# Patient Record
Sex: MALE | Race: OTHER | HISPANIC OR LATINO
[De-identification: names, ages, dates, MRNs, and addresses within clinical notes are randomized per-mention and may not be internally consistent; named-entity substitution may affect disease eponyms.]

---

## 2017-02-24 ENCOUNTER — APPOINTMENT (OUTPATIENT)
Dept: FAMILY MEDICINE | Facility: CLINIC | Age: 47
End: 2017-02-24

## 2017-02-24 VITALS
BODY MASS INDEX: 38.33 KG/M2 | WEIGHT: 283 LBS | SYSTOLIC BLOOD PRESSURE: 147 MMHG | TEMPERATURE: 98.5 F | HEIGHT: 72 IN | DIASTOLIC BLOOD PRESSURE: 95 MMHG | OXYGEN SATURATION: 98 % | HEART RATE: 81 BPM

## 2017-02-24 VITALS — SYSTOLIC BLOOD PRESSURE: 138 MMHG | DIASTOLIC BLOOD PRESSURE: 88 MMHG

## 2017-02-24 DIAGNOSIS — Z01.818 ENCOUNTER FOR OTHER PREPROCEDURAL EXAMINATION: ICD-10-CM

## 2017-02-24 DIAGNOSIS — R21 RASH AND OTHER NONSPECIFIC SKIN ERUPTION: ICD-10-CM

## 2017-02-24 DIAGNOSIS — K62.5 HEMORRHAGE OF ANUS AND RECTUM: ICD-10-CM

## 2017-02-24 DIAGNOSIS — M65.321 TRIGGER FINGER, RIGHT INDEX FINGER: ICD-10-CM

## 2017-02-24 DIAGNOSIS — M25.561 PAIN IN RIGHT KNEE: ICD-10-CM

## 2017-02-24 DIAGNOSIS — N41.9 INFLAMMATORY DISEASE OF PROSTATE, UNSPECIFIED: ICD-10-CM

## 2017-02-24 DIAGNOSIS — Z87.891 PERSONAL HISTORY OF NICOTINE DEPENDENCE: ICD-10-CM

## 2017-02-24 DIAGNOSIS — Z87.19 PERSONAL HISTORY OF OTHER DISEASES OF THE DIGESTIVE SYSTEM: ICD-10-CM

## 2017-02-24 DIAGNOSIS — R09.82 POSTNASAL DRIP: ICD-10-CM

## 2017-02-24 DIAGNOSIS — M77.30 CALCANEAL SPUR, UNSPECIFIED FOOT: ICD-10-CM

## 2017-02-24 RX ORDER — LOSARTAN POTASSIUM 50 MG/1
50 TABLET, FILM COATED ORAL
Qty: 30 | Refills: 0 | Status: DISCONTINUED | COMMUNITY
Start: 2017-02-22

## 2017-04-07 ENCOUNTER — APPOINTMENT (OUTPATIENT)
Dept: FAMILY MEDICINE | Facility: CLINIC | Age: 47
End: 2017-04-07

## 2017-04-07 VITALS
DIASTOLIC BLOOD PRESSURE: 85 MMHG | BODY MASS INDEX: 39.01 KG/M2 | HEIGHT: 72 IN | TEMPERATURE: 97.4 F | HEART RATE: 83 BPM | WEIGHT: 288 LBS | OXYGEN SATURATION: 98 % | SYSTOLIC BLOOD PRESSURE: 156 MMHG

## 2017-04-07 DIAGNOSIS — Z78.9 OTHER SPECIFIED HEALTH STATUS: ICD-10-CM

## 2017-04-07 DIAGNOSIS — R10.30 LOWER ABDOMINAL PAIN, UNSPECIFIED: ICD-10-CM

## 2017-04-07 DIAGNOSIS — Z01.818 ENCOUNTER FOR OTHER PREPROCEDURAL EXAMINATION: ICD-10-CM

## 2017-04-08 LAB
25(OH)D3 SERPL-MCNC: 18.6 NG/ML
ANION GAP SERPL CALC-SCNC: 15 MMOL/L
BUN SERPL-MCNC: 14 MG/DL
CALCIUM SERPL-MCNC: 9.4 MG/DL
CHLORIDE SERPL-SCNC: 100 MMOL/L
CO2 SERPL-SCNC: 27 MMOL/L
CREAT SERPL-MCNC: 0.99 MG/DL
GLUCOSE SERPL-MCNC: 111 MG/DL
POTASSIUM SERPL-SCNC: 4.1 MMOL/L
SODIUM SERPL-SCNC: 142 MMOL/L

## 2017-07-12 ENCOUNTER — APPOINTMENT (OUTPATIENT)
Dept: FAMILY MEDICINE | Facility: CLINIC | Age: 47
End: 2017-07-12

## 2017-07-12 VITALS
WEIGHT: 303 LBS | HEIGHT: 72 IN | HEART RATE: 89 BPM | OXYGEN SATURATION: 97 % | SYSTOLIC BLOOD PRESSURE: 133 MMHG | DIASTOLIC BLOOD PRESSURE: 84 MMHG | TEMPERATURE: 98.5 F | BODY MASS INDEX: 41.04 KG/M2

## 2017-07-12 RX ORDER — FLUNISOLIDE 0.25 MG/ML
25 MCG/ACT SOLUTION NASAL TWICE DAILY
Qty: 3 | Refills: 1 | Status: DISCONTINUED | COMMUNITY
Start: 2017-02-22 | End: 2017-07-12

## 2017-07-12 RX ORDER — ERGOCALCIFEROL 1.25 MG/1
1.25 MG CAPSULE, LIQUID FILLED ORAL
Qty: 12 | Refills: 0 | Status: DISCONTINUED | COMMUNITY
Start: 2017-04-08 | End: 2017-07-12

## 2017-07-12 RX ORDER — NAPROXEN SODIUM 220 MG
220 TABLET ORAL
Refills: 0 | Status: DISCONTINUED | COMMUNITY
Start: 2017-02-24 | End: 2017-07-12

## 2017-07-16 LAB
25(OH)D3 SERPL-MCNC: 21.5 NG/ML
ALBUMIN SERPL ELPH-MCNC: 4.7 G/DL
ALP BLD-CCNC: 55 U/L
ALT SERPL-CCNC: 48 U/L
ANION GAP SERPL CALC-SCNC: 17 MMOL/L
AST SERPL-CCNC: 42 U/L
BASOPHILS # BLD AUTO: 0.02 K/UL
BASOPHILS NFR BLD AUTO: 0.3 %
BILIRUB SERPL-MCNC: 0.7 MG/DL
BUN SERPL-MCNC: 15 MG/DL
CALCIUM SERPL-MCNC: 10.2 MG/DL
CHLORIDE SERPL-SCNC: 101 MMOL/L
CO2 SERPL-SCNC: 22 MMOL/L
CREAT SERPL-MCNC: 1.07 MG/DL
EOSINOPHIL # BLD AUTO: 0.27 K/UL
EOSINOPHIL NFR BLD AUTO: 4 %
ERYTHROCYTE [SEDIMENTATION RATE] IN BLOOD BY WESTERGREN METHOD: 3 MM/HR
GLUCOSE SERPL-MCNC: 92 MG/DL
HBA1C MFR BLD HPLC: 5.6 %
HCT VFR BLD CALC: 40.1 %
HGB BLD-MCNC: 14.1 G/DL
IMM GRANULOCYTES NFR BLD AUTO: 0.1 %
LYMPHOCYTES # BLD AUTO: 1.36 K/UL
LYMPHOCYTES NFR BLD AUTO: 20.2 %
MAN DIFF?: NORMAL
MCHC RBC-ENTMCNC: 30 PG
MCHC RBC-ENTMCNC: 35.2 GM/DL
MCV RBC AUTO: 85.3 FL
MONOCYTES # BLD AUTO: 0.39 K/UL
MONOCYTES NFR BLD AUTO: 5.8 %
NEUTROPHILS # BLD AUTO: 4.69 K/UL
NEUTROPHILS NFR BLD AUTO: 69.6 %
PLATELET # BLD AUTO: 286 K/UL
POTASSIUM SERPL-SCNC: 3.8 MMOL/L
PROT SERPL-MCNC: 7.9 G/DL
RBC # BLD: 4.7 M/UL
RBC # FLD: 12.9 %
SODIUM SERPL-SCNC: 140 MMOL/L
T4 FREE SERPL-MCNC: 1 NG/DL
TSH SERPL-ACNC: 1.79 UIU/ML
WBC # FLD AUTO: 6.74 K/UL

## 2017-07-17 DIAGNOSIS — E29.1 TESTICULAR HYPOFUNCTION: ICD-10-CM

## 2017-07-17 LAB
TESTOST BND SERPL-MCNC: 3.6 PG/ML
TESTOST SERPL-MCNC: 297.7 NG/DL

## 2017-07-19 PROBLEM — E29.1 HYPOGONADISM, MALE: Status: ACTIVE | Noted: 2017-07-19

## 2017-10-12 ENCOUNTER — RX RENEWAL (OUTPATIENT)
Age: 47
End: 2017-10-12

## 2017-11-08 ENCOUNTER — APPOINTMENT (OUTPATIENT)
Dept: FAMILY MEDICINE | Facility: CLINIC | Age: 47
End: 2017-11-08
Payer: COMMERCIAL

## 2017-11-08 VITALS
BODY MASS INDEX: 42.66 KG/M2 | DIASTOLIC BLOOD PRESSURE: 91 MMHG | WEIGHT: 315 LBS | OXYGEN SATURATION: 97 % | HEIGHT: 72 IN | SYSTOLIC BLOOD PRESSURE: 158 MMHG | HEART RATE: 70 BPM | TEMPERATURE: 97.9 F

## 2017-11-08 DIAGNOSIS — K64.4 RESIDUAL HEMORRHOIDAL SKIN TAGS: ICD-10-CM

## 2017-11-08 DIAGNOSIS — M70.21 OLECRANON BURSITIS, RIGHT ELBOW: ICD-10-CM

## 2017-11-08 DIAGNOSIS — Z23 ENCOUNTER FOR IMMUNIZATION: ICD-10-CM

## 2017-11-08 PROCEDURE — 99214 OFFICE O/P EST MOD 30 MIN: CPT | Mod: 25

## 2017-11-08 PROCEDURE — G0008: CPT

## 2017-11-08 PROCEDURE — 90686 IIV4 VACC NO PRSV 0.5 ML IM: CPT

## 2017-11-09 PROBLEM — Z23 NEED FOR INFLUENZA VACCINATION: Status: ACTIVE | Noted: 2017-11-08

## 2017-11-09 PROBLEM — K64.4 HEMORRHOIDS, EXTERNAL: Status: ACTIVE | Noted: 2017-07-12

## 2017-11-12 PROBLEM — M70.21 OLECRANON BURSITIS OF RIGHT ELBOW: Status: ACTIVE | Noted: 2017-11-12

## 2017-12-30 ENCOUNTER — RX RENEWAL (OUTPATIENT)
Age: 47
End: 2017-12-30

## 2018-02-07 ENCOUNTER — APPOINTMENT (OUTPATIENT)
Dept: FAMILY MEDICINE | Facility: CLINIC | Age: 48
End: 2018-02-07

## 2018-04-12 ENCOUNTER — RX RENEWAL (OUTPATIENT)
Age: 48
End: 2018-04-12

## 2018-12-31 ENCOUNTER — LABORATORY RESULT (OUTPATIENT)
Age: 48
End: 2018-12-31

## 2018-12-31 ENCOUNTER — APPOINTMENT (OUTPATIENT)
Dept: INTERNAL MEDICINE | Facility: CLINIC | Age: 48
End: 2018-12-31
Payer: COMMERCIAL

## 2018-12-31 ENCOUNTER — NON-APPOINTMENT (OUTPATIENT)
Age: 48
End: 2018-12-31

## 2018-12-31 VITALS
SYSTOLIC BLOOD PRESSURE: 138 MMHG | HEIGHT: 72 IN | BODY MASS INDEX: 42.66 KG/M2 | OXYGEN SATURATION: 97 % | HEART RATE: 90 BPM | DIASTOLIC BLOOD PRESSURE: 82 MMHG | WEIGHT: 315 LBS | TEMPERATURE: 99.1 F

## 2018-12-31 DIAGNOSIS — Z23 ENCOUNTER FOR IMMUNIZATION: ICD-10-CM

## 2018-12-31 DIAGNOSIS — R94.5 ABNORMAL RESULTS OF LIVER FUNCTION STUDIES: ICD-10-CM

## 2018-12-31 DIAGNOSIS — L30.9 DERMATITIS, UNSPECIFIED: ICD-10-CM

## 2018-12-31 DIAGNOSIS — R79.89 OTHER SPECIFIED ABNORMAL FINDINGS OF BLOOD CHEMISTRY: ICD-10-CM

## 2018-12-31 PROCEDURE — 99396 PREV VISIT EST AGE 40-64: CPT | Mod: 25

## 2018-12-31 PROCEDURE — 93000 ELECTROCARDIOGRAM COMPLETE: CPT

## 2018-12-31 PROCEDURE — 36415 COLL VENOUS BLD VENIPUNCTURE: CPT

## 2018-12-31 PROCEDURE — 96127 BRIEF EMOTIONAL/BEHAV ASSMT: CPT

## 2018-12-31 PROCEDURE — 90471 IMMUNIZATION ADMIN: CPT

## 2018-12-31 PROCEDURE — 90715 TDAP VACCINE 7 YRS/> IM: CPT

## 2018-12-31 NOTE — HEALTH RISK ASSESSMENT
[Fully functional (bathing, dressing, toileting, transferring, walking, feeding)] : Fully functional (bathing, dressing, toileting, transferring, walking, feeding) [Fully functional (using the telephone, shopping, preparing meals, housekeeping, doing laundry, using] : Fully functional and needs no help or supervision to perform IADLs (using the telephone, shopping, preparing meals, housekeeping, doing laundry, using transportation, managing medications and managing finances) [No falls in past year] : Patient reported no falls in the past year [HIV Test offered] : HIV Test offered [With Significant Other] : lives with significant other [Employed] : employed [] :  [] : No [FreeTextEntry2] :

## 2018-12-31 NOTE — ASSESSMENT
[FreeTextEntry1] : \par Depression:\par -PHQ 9 score 5\par -tx options discussed\par -he thinks he would benefit from medication\par -will start lexapro 10mg Po daily  (R/B/A/side effects discussed)\par -f/u 3-4 weeks\par -no SI/HI\par \par Arthralgias/joint stiffness:\par -will check labs\par -continue meloxicam and cyclobenzaprine prn\par -will refer to rheumatology\par \par Dermatitis: ? psoriasis\par -he states clobestasol cream has helped in past-will refill\par -I again referred him to dermatology\par \par Rectal bleeding:\par -hx of external hemorrhoids\par -I again advised he see colorectal surgery\par \par Lung nodule:\par -CT chest ordered 2017 but he did not go for study\par -will order again today-importance of f/u discussed\par \par Kidney stones/Gross hematuria\par -he is followed by Urologist but would like to see another urologist for another opinion\par -appears he had cystoscopy done last year\par -will check renal US\par -will refer to Dr Hoyos\par \par HTN:\par -138/82 today-he reports he is taking losatan every day but appears I last refilled this 2017\par -for now continue losartan 50mg PO daily\par -I adv low fat/low cholesterol diet, low salt diet and weight loss encouraged\par -f/u 3-4 weeks for BP check\par \par PORTILLO:\par -on CPAP-he will f/u with pulmonary to get new supplies\par \par Obesity/Pre-DM:\par -HgA1c 5.7 2017-will check labs\par -low fat/low chol diet and weight loss advised\par \par Vitamin D deficiency:\par -will check vitamin D 25-OH\par \par Low testosterone:\par -will repeat AM testosterone and check LH/FSH/prolactin, PSA\par \par Elevated LFTs:\par -will check labs incluidng hepatitis B and c serologies\par -has hx of fatty liver\par \par HCM:\par \par CPE 2018\par \par EK2018 NSR at 78, no ST abnormalities\par \par Flu shot: 2018\par \par Tdap: advised.  R/B discussed.  VIS given.  tdap given 2018\par \par HIV testing: negative 2017-offered today 2018-he consented to testing\par \par Hepatitis C screening: negative 2015\par \par PSA: 0.77 2017-will check PSA\par \par Depression screenin2018 PHQ 9 score 5\par \par F/U 4-6 weeks.  Labs ordered-he will have done at the lab

## 2018-12-31 NOTE — HISTORY OF PRESENT ILLNESS
[de-identified] : Here for CPE\par \par He was last seen 11/2017 and labs and CT chest ordered but he did not get studies done\par \par He states his psoriasis has been acting up on his knuckles, ankles and elbows.  he states he has joint stiffness as well and is concerned about psoriatic arthritis.  he was prescribed meloxicam and cyclobenzaprine by his "arthritis doctor".  He was previously advised to have "xrays" but he did not get them done\par \par He states he was seeing urologist for hematuria.  he states kidney stone still bothers him from time to time.  he states urologist told him He states for the past year he has been feeling a depressed.  he states he has been depressed about his weight, job stress and joint pain.  No SI/HI.

## 2018-12-31 NOTE — REVIEW OF SYSTEMS
[Skin Lesions] : skin lesion [Arthralgias] : arthralgias [Joint Pain] : joint pain [Joint Stiffness] : joint stiffness [see HPI] : see HPI [Depression] : depression [Negative] : Heme/Lymph [Fever] : no fever [Chills] : no chills [Feeling Poorly] : not feeling poorly [Feeling Tired] : not feeling tired [Recent Weight Loss (___ Lbs)] : no recent weight loss [Earache] : no earache [Nasal Discharge] : no nasal discharge [Sore Throat] : no sore throat [Chest Pain] : no chest pain [Palpitations] : no palpitations [Lower Ext Edema] : no extremity edema [Shortness Of Breath] : no shortness of breath [Cough] : no cough [Wheezing] : no wheezing [SOB on Exertion] : no shortness of breath during exertion [Abdominal Pain] : no abdominal pain [Vomiting] : no vomiting [Diarrhea] : no diarrhea [Dysuria] : no dysuria [Joint Swelling] : no joint swelling [Suicidal] : not suicidal [Sleep Disturbances] : no sleep disturbances [Anxiety] : no anxiety

## 2018-12-31 NOTE — PHYSICAL EXAM
[General Appearance - Alert] : alert [General Appearance - In No Acute Distress] : in no acute distress [Sclera] : the sclera and conjunctiva were normal [PERRL With Normal Accommodation] : pupils were equal in size, round, and reactive to light [Oropharynx] : the oropharynx was normal [Neck Appearance] : the appearance of the neck was normal [Jugular Venous Distention Increased] : there was no jugular-venous distention [Auscultation Breath Sounds / Voice Sounds] : lungs were clear to auscultation bilaterally [Heart Rate And Rhythm] : heart rate was normal and rhythm regular [Heart Sounds] : normal S1 and S2 [Heart Sounds Gallop] : no gallops [Murmurs] : no murmurs [Heart Sounds Pericardial Friction Rub] : no pericardial rub [Edema] : there was no peripheral edema [Bowel Sounds] : normal bowel sounds [Abdomen Soft] : soft [Abdomen Tenderness] : non-tender [] : no hepato-splenomegaly [Abdomen Mass (___ Cm)] : no abdominal mass palpated [No Spinal Tenderness] : no spinal tenderness [Nail Clubbing] : no clubbing  or cyanosis of the fingernails [Musculoskeletal - Swelling] : no joint swelling seen [No Focal Deficits] : no focal deficits [Oriented To Time, Place, And Person] : oriented to person, place, and time [Affect] : the affect was normal [Mood] : the mood was normal [Extraocular Movements] : extraocular movements were intact [Outer Ear] : the ears and nose were normal in appearance [Both Tympanic Membranes Were Examined] : both tympanic membranes were normal [Nasal Cavity] : the nasal mucosa and septum were normal [Neck Cervical Mass (___cm)] : no neck mass was observed [Thyroid Diffuse Enlargement] : the thyroid was not enlarged [Thyroid Nodule] : there were no palpable thyroid nodules [Arterial Pulses Carotid] : carotid pulses were normal with no bruits [No CVA Tenderness] : no ~M costovertebral angle tenderness [Cranial Nerves] : cranial nerves 2-12 were intact [Motor Exam] : the motor exam was normal [FreeTextEntry1] : B/L knuckles, elbows and left ankle with patchy erythematous dermatitis with dry silvery scaling

## 2019-01-06 LAB
25(OH)D3 SERPL-MCNC: 12.5 NG/ML
ALBUMIN SERPL ELPH-MCNC: 4.5 G/DL
ALP BLD-CCNC: 60 U/L
ALT SERPL-CCNC: 52 U/L
ANA PAT FLD IF-IMP: ABNORMAL
ANACR T: ABNORMAL
ANION GAP SERPL CALC-SCNC: 12 MMOL/L
APPEARANCE: CLEAR
AST SERPL-CCNC: 38 U/L
B BURGDOR IGG+IGM SER QL IB: NORMAL
BACTERIA: NEGATIVE
BASOPHILS # BLD AUTO: 0.02 K/UL
BASOPHILS NFR BLD AUTO: 0.3 %
BILIRUB SERPL-MCNC: 0.4 MG/DL
BILIRUBIN URINE: NEGATIVE
BLOOD URINE: ABNORMAL
BUN SERPL-MCNC: 13 MG/DL
CALCIUM SERPL-MCNC: 9.4 MG/DL
CCP AB SER IA-ACNC: <8 UNITS
CHLORIDE SERPL-SCNC: 100 MMOL/L
CHOLEST SERPL-MCNC: 146 MG/DL
CHOLEST/HDLC SERPL: 3.7 RATIO
CK SERPL-CCNC: 128 U/L
CO2 SERPL-SCNC: 25 MMOL/L
COLOR: YELLOW
CREAT SERPL-MCNC: 0.89 MG/DL
CRP SERPL-MCNC: 0.32 MG/DL
EOSINOPHIL # BLD AUTO: 0.36 K/UL
EOSINOPHIL NFR BLD AUTO: 5.6 %
ERYTHROCYTE [SEDIMENTATION RATE] IN BLOOD BY WESTERGREN METHOD: 17 MM/HR
FSH SERPL-MCNC: 6 IU/L
GLUCOSE QUALITATIVE U: NEGATIVE MG/DL
GLUCOSE SERPL-MCNC: 88 MG/DL
HBA1C MFR BLD HPLC: 6 %
HBV CORE IGG+IGM SER QL: NONREACTIVE
HBV SURFACE AB SER QL: NONREACTIVE
HBV SURFACE AG SER QL: NONREACTIVE
HCT VFR BLD CALC: 44.5 %
HCV AB SER QL: NONREACTIVE
HCV S/CO RATIO: 0.05 S/CO
HDLC SERPL-MCNC: 39 MG/DL
HGB BLD-MCNC: 14.9 G/DL
HIV1+2 AB SPEC QL IA.RAPID: NONREACTIVE
HYALINE CASTS: 2 /LPF
IMM GRANULOCYTES NFR BLD AUTO: 0.3 %
KETONES URINE: NEGATIVE
LDLC SERPL CALC-MCNC: 96 MG/DL
LEUKOCYTE ESTERASE URINE: NEGATIVE
LH SERPL-ACNC: 5.7 IU/L
LYMPHOCYTES # BLD AUTO: 1.42 K/UL
LYMPHOCYTES NFR BLD AUTO: 21.9 %
MAN DIFF?: NORMAL
MCHC RBC-ENTMCNC: 29.7 PG
MCHC RBC-ENTMCNC: 33.5 GM/DL
MCV RBC AUTO: 88.6 FL
MICROSCOPIC-UA: NORMAL
MONOCYTES # BLD AUTO: 0.37 K/UL
MONOCYTES NFR BLD AUTO: 5.7 %
NEUTROPHILS # BLD AUTO: 4.29 K/UL
NEUTROPHILS NFR BLD AUTO: 66.2 %
NITRITE URINE: NEGATIVE
PH URINE: 7.5
PLATELET # BLD AUTO: 269 K/UL
POTASSIUM SERPL-SCNC: 4.1 MMOL/L
PROLACTIN SERPL-MCNC: 10.1 NG/ML
PROT SERPL-MCNC: 7.5 G/DL
PROTEIN URINE: ABNORMAL MG/DL
PSA SERPL-MCNC: 0.84 NG/ML
RBC # BLD: 5.02 M/UL
RBC # FLD: 14.1 %
RED BLOOD CELLS URINE: 461 /HPF
RF+CCP IGG SER-IMP: NEGATIVE
RHEUMATOID FACT SER QL: <10 IU/ML
SODIUM SERPL-SCNC: 137 MMOL/L
SPECIFIC GRAVITY URINE: 1.02
SQUAMOUS EPITHELIAL CELLS: 1 /HPF
T4 FREE SERPL-MCNC: 1.2 NG/DL
TESTOST BND SERPL-MCNC: 6.6 PG/ML
TESTOST SERPL-MCNC: 374.5 NG/DL
TRIGL SERPL-MCNC: 56 MG/DL
TSH SERPL-ACNC: 2.1 UIU/ML
URATE SERPL-MCNC: 5.3 MG/DL
UROBILINOGEN URINE: NEGATIVE MG/DL
WBC # FLD AUTO: 6.48 K/UL
WHITE BLOOD CELLS URINE: 1 /HPF

## 2019-01-27 ENCOUNTER — RX RENEWAL (OUTPATIENT)
Age: 49
End: 2019-01-27

## 2019-01-30 ENCOUNTER — APPOINTMENT (OUTPATIENT)
Dept: UROLOGY | Facility: CLINIC | Age: 49
End: 2019-01-30
Payer: COMMERCIAL

## 2019-01-30 DIAGNOSIS — N23 UNSPECIFIED RENAL COLIC: ICD-10-CM

## 2019-01-30 DIAGNOSIS — R31.0 GROSS HEMATURIA: ICD-10-CM

## 2019-01-30 PROCEDURE — 99205 OFFICE O/P NEW HI 60 MIN: CPT | Mod: 25

## 2019-01-30 PROCEDURE — 81003 URINALYSIS AUTO W/O SCOPE: CPT | Mod: QW

## 2019-01-30 NOTE — PHYSICAL EXAM
[General Appearance - Well Developed] : well developed [General Appearance - Well Nourished] : well nourished [Normal Appearance] : normal appearance [Well Groomed] : well groomed [General Appearance - In No Acute Distress] : no acute distress [Edema] : no peripheral edema [Respiration, Rhythm And Depth] : normal respiratory rhythm and effort [Exaggerated Use Of Accessory Muscles For Inspiration] : no accessory muscle use [Auscultation Breath Sounds / Voice Sounds] : lungs were clear to auscultation bilaterally [Bowel Sounds] : normal bowel sounds [Abdomen Soft] : soft [Abdomen Tenderness] : non-tender [Abdomen Mass (___ Cm)] : no abdominal mass palpated [Costovertebral Angle Tenderness] : no ~M costovertebral angle tenderness [FreeTextEntry1] : obese [Urethral Meatus] : meatus normal [Penis Abnormality] : normal circumcised penis [Urinary Bladder Findings] : the bladder was normal on palpation [Scrotum] : the scrotum was normal [Epididymis] : the epididymides were normal [Testes Tenderness] : no tenderness of the testes [Testes Mass (___cm)] : there were no testicular masses [Anus Abnormality] : the anus and perineum were normal [Rectal Exam - Rectum] : digital rectal exam was normal [Prostate Tenderness] : the prostate was not tender [No Prostate Nodules] : no prostate nodules [Prostate Size ___ gm] : prostate size [unfilled] gm [Normal Station and Gait] : the gait and station were normal for the patient's age [] : no rash [No Focal Deficits] : no focal deficits [Oriented To Time, Place, And Person] : oriented to person, place, and time [Affect] : the affect was normal [Mood] : the mood was normal [Not Anxious] : not anxious [No Palpable Adenopathy] : no palpable adenopathy

## 2019-01-30 NOTE — ASSESSMENT
[FreeTextEntry1] : #1) 7 mm calculus lower pole left kidney on renal sonogram from [facility] on 1/12/19\par \par #2) gross hematuria\par CT of the abdomen and pelvis without and with IV contrast with serum creatinine before the CT at [facility] with KUB now.\par \par #3) left renal colic since 1/18\par The patient was referred to my partner, Dr. Medrano. He will see him tomorrow to arrange a procedure to treat this calculus.\par He was advised to take OTC ibuprofen 200 mg, 4 tablets q.8 h. p.r.n. for pain, drain 2 L of water a day and to purchase a pocket strainer so that all of the urine should be strained in case the stone passes.

## 2019-01-30 NOTE — REVIEW OF SYSTEMS
[see HPI] : see HPI [Blood in urine that you can see] : blood visible in urine [History of kidney stones] : history of kidney stones [Wake up at night to urinate  How many times?  ___] : wakes up to urinate [unfilled] times during the night [Strain or push to urinate] : strain or push to urinate [Wait a long time to urinate] : waits a long time to urinate [Slow urine stream] : slow urine stream [Interrupted urine stream] : interrupted urine stream [Leakage of urine with urgency] : leakage of urine with urgency [Negative] : Heme/Lymph

## 2019-01-30 NOTE — LETTER BODY
[Dear  ___] : Dear  [unfilled], [Consult Letter:] : I had the pleasure of evaluating your patient, [unfilled]. [( Thank you for referring [unfilled] for consultation for _____ )] : Thank you for referring [unfilled] for consultation for [unfilled] [Please see my note below.] : Please see my note below. [Consult Closing:] : Thank you very much for allowing me to participate in the care of this patient.  If you have any questions, please do not hesitate to contact me. [Sincerely,] : Sincerely,

## 2019-01-30 NOTE — HISTORY OF PRESENT ILLNESS
[FreeTextEntry1] : The patient is a 48-year-old gentleman who was seen in the office today for the above. He stated that he was first seen by a urologist, Dr. Elsa Pastor, in 2017 for right and left groin pain and urethral discharge. At that first visit, microscopic blood was detected as well as a prostatic nodule on SHARMAINE. He was treated for prostatitis and the urethral discharge resolved in the bilateral groin pain markedly decreased but is still present intermittently. It is unclear if a CAT scan of the abdomen and pelvis without and with IV contrast was done for the microscopic hematuria. He brought medical records from Dr. Pastor's office which were reviewed and this took an extremely long time. He was taken to the operating room at [facility] on 2/28/17 by Dr. Pastor and underwent cystoscopy, left retrograde pyelography, left ureteroscopy, and TRUSP with random prostate biopsies and biopsy of an area that was called the nodule. All the prostate biopsies were negative. His PSA at that time was noted to be 0.9. The left retrograde pyelogram and left ureteroscopy/pyeloscopy were reported as negative. He was doing well until 1/18 when he developed pink urine with sharp left flank pain that was at a pain level of 3-5/10. It radiated into the left abdomen and left groin but was not associated with nausea, vomiting, fever or chills. He was able to be in drink. His daytime frequency he remained the same at 4 times a day with nocturia x1 without any other urological or constitutional symptomatology. He returned to Dr. Pastor's office and was seen by Dr. Melo who ordered a CT urogram which was done on 3/3/18 and demonstrated a 7 mm calculus in the lower pole of left kidney. He also underwent office cystoscopy which he was told was negative. According to the patient, treatment of this stone was not addressed because of its location. He was seen by his PCP, Dr. Souza, who ordered a renal sonogram. This was done on 1/12/19 and showed a 6 mm calculus in the lower pole of left kidney unchanged when compared to the CT scan of 3/3/18. He was referred to this office for urological evaluation. He continues to have intermittent renal colic which is present today.\par \par Past Urological History:\par 2000-passed a kidney stone but not recovered\par 1/17-prostatitis\par 2/28/17-cystoscopy, left retrograde pyelography, left ureteroscopy and pyeloscopy, and TRUSP with prostate biopsies-all negative.\par \par Urological Family History:\par Paternal uncle-nephrolithiasis\par Brother-benign bladder tumor

## 2019-01-31 ENCOUNTER — APPOINTMENT (OUTPATIENT)
Dept: UROLOGY | Facility: CLINIC | Age: 49
End: 2019-01-31

## 2019-01-31 LAB
APPEARANCE: CLEAR
BACTERIA: NEGATIVE
BILIRUBIN URINE: NEGATIVE
BLOOD URINE: ABNORMAL
COLOR: YELLOW
GLUCOSE QUALITATIVE U: NEGATIVE MG/DL
HYALINE CASTS: 1 /LPF
KETONES URINE: NEGATIVE
LEUKOCYTE ESTERASE URINE: NEGATIVE
MICROSCOPIC-UA: NORMAL
NITRITE URINE: NEGATIVE
PH URINE: 6
PROTEIN URINE: NEGATIVE MG/DL
RED BLOOD CELLS URINE: 6 /HPF
SPECIFIC GRAVITY URINE: 1.02
SQUAMOUS EPITHELIAL CELLS: 0 /HPF
UROBILINOGEN URINE: 1 MG/DL
WHITE BLOOD CELLS URINE: 1 /HPF

## 2019-02-04 ENCOUNTER — APPOINTMENT (OUTPATIENT)
Dept: INTERNAL MEDICINE | Facility: CLINIC | Age: 49
End: 2019-02-04

## 2019-02-04 LAB — BACTERIA UR CULT: NORMAL

## 2019-02-13 ENCOUNTER — TRANSCRIPTION ENCOUNTER (OUTPATIENT)
Age: 49
End: 2019-02-13

## 2019-02-14 ENCOUNTER — APPOINTMENT (OUTPATIENT)
Age: 49
End: 2019-02-14
Payer: COMMERCIAL

## 2019-02-14 VITALS
DIASTOLIC BLOOD PRESSURE: 85 MMHG | BODY MASS INDEX: 42.66 KG/M2 | HEART RATE: 68 BPM | HEIGHT: 72 IN | SYSTOLIC BLOOD PRESSURE: 141 MMHG | RESPIRATION RATE: 14 BRPM | WEIGHT: 315 LBS

## 2019-02-14 PROCEDURE — 99214 OFFICE O/P EST MOD 30 MIN: CPT

## 2019-02-14 NOTE — HISTORY OF PRESENT ILLNESS
[FreeTextEntry1] : 49 yo male presents for Kidney stone. \par Recently saw Dr Milian who asked him to see me to discuss further management. \par Complaining of off and on left flank pain 2-3/10.\par Denies dysuria, hematuria, fever, chills or rigors. \par Had seen another Urologist in the past and underwent Cystoscopy for Microhematuria and Prostate biopsy for Prostate nodule.

## 2019-02-14 NOTE — ASSESSMENT
[FreeTextEntry1] : Kidney stone:\par non obstructing 6 mm left Kidney stone. \par Discussed the options of watch and wait vs medical management vs intervention via ureteroscopic approach vs ESL. Risks and benefits of each modality were discussed.\par Pt wants to proceed with ESWL. \par Procedure specific risks of ESWL were discussed: risk of bleeding, infection, damage to adjacent tissue and organs, bruising in the back or abdomen, pain as the stone fragments pass, failure of stone fragments to pass, Steinstrasse requiring additional surgery. Also mentioned that that there is no definite evidence but some studies have shown potential long term risk of Diabetes and Hypertension\par Will need medical clearance from PCP and presurgical testing at [facility].\par Call for fevers, chill, severe nausea and vomiting, or severe pain or go to ER for worsening of medical condition may need urgent ureteral stent placement.\par Will get KUB after Milk of Magnesia for nights and if stone seen will proceed with ESWL. \par \par \par

## 2019-02-26 ENCOUNTER — RX RENEWAL (OUTPATIENT)
Age: 49
End: 2019-02-26

## 2019-03-07 ENCOUNTER — FORM ENCOUNTER (OUTPATIENT)
Age: 49
End: 2019-03-07

## 2019-03-08 ENCOUNTER — OUTPATIENT (OUTPATIENT)
Dept: OUTPATIENT SERVICES | Facility: HOSPITAL | Age: 49
LOS: 1 days | Discharge: HOME | End: 2019-03-08
Payer: COMMERCIAL

## 2019-03-08 VITALS
DIASTOLIC BLOOD PRESSURE: 63 MMHG | OXYGEN SATURATION: 100 % | SYSTOLIC BLOOD PRESSURE: 148 MMHG | RESPIRATION RATE: 16 BRPM | TEMPERATURE: 98 F | HEIGHT: 71.5 IN | HEART RATE: 69 BPM | WEIGHT: 315 LBS

## 2019-03-08 DIAGNOSIS — Z98.890 OTHER SPECIFIED POSTPROCEDURAL STATES: Chronic | ICD-10-CM

## 2019-03-08 DIAGNOSIS — N20.0 CALCULUS OF KIDNEY: ICD-10-CM

## 2019-03-08 LAB
ABO RH CONFIRMATION: SIGNIFICANT CHANGE UP
ANION GAP SERPL CALC-SCNC: 4 MMOL/L — LOW (ref 5–17)
APPEARANCE UR: CLEAR — SIGNIFICANT CHANGE UP
APTT BLD: 31.8 SEC — SIGNIFICANT CHANGE UP (ref 27.5–36.3)
BASOPHILS # BLD AUTO: 0.02 K/UL — SIGNIFICANT CHANGE UP (ref 0–0.2)
BASOPHILS NFR BLD AUTO: 0.5 % — SIGNIFICANT CHANGE UP (ref 0–2)
BILIRUB UR-MCNC: NEGATIVE — SIGNIFICANT CHANGE UP
BLD GP AB SCN SERPL QL: SIGNIFICANT CHANGE UP
BUN SERPL-MCNC: 13 MG/DL — SIGNIFICANT CHANGE UP (ref 7–23)
CALCIUM SERPL-MCNC: 8.7 MG/DL — SIGNIFICANT CHANGE UP (ref 8.5–10.1)
CHLORIDE SERPL-SCNC: 103 MMOL/L — SIGNIFICANT CHANGE UP (ref 96–108)
CO2 SERPL-SCNC: 30 MMOL/L — SIGNIFICANT CHANGE UP (ref 22–31)
COLOR SPEC: YELLOW — SIGNIFICANT CHANGE UP
CREAT SERPL-MCNC: 0.9 MG/DL — SIGNIFICANT CHANGE UP (ref 0.5–1.3)
DIFF PNL FLD: NEGATIVE — SIGNIFICANT CHANGE UP
EOSINOPHIL # BLD AUTO: 0.26 K/UL — SIGNIFICANT CHANGE UP (ref 0–0.5)
EOSINOPHIL NFR BLD AUTO: 5.9 % — SIGNIFICANT CHANGE UP (ref 0–6)
GLUCOSE SERPL-MCNC: 107 MG/DL — HIGH (ref 70–99)
GLUCOSE UR QL: NEGATIVE MG/DL — SIGNIFICANT CHANGE UP
HCT VFR BLD CALC: 42.3 % — SIGNIFICANT CHANGE UP (ref 39–50)
HGB BLD-MCNC: 14.7 G/DL — SIGNIFICANT CHANGE UP (ref 13–17)
IMM GRANULOCYTES NFR BLD AUTO: 0.2 % — SIGNIFICANT CHANGE UP (ref 0–1.5)
INR BLD: 1.07 RATIO — SIGNIFICANT CHANGE UP (ref 0.88–1.16)
KETONES UR-MCNC: NEGATIVE — SIGNIFICANT CHANGE UP
LEUKOCYTE ESTERASE UR-ACNC: NEGATIVE — SIGNIFICANT CHANGE UP
LYMPHOCYTES # BLD AUTO: 1.28 K/UL — SIGNIFICANT CHANGE UP (ref 1–3.3)
LYMPHOCYTES # BLD AUTO: 28.8 % — SIGNIFICANT CHANGE UP (ref 13–44)
MCHC RBC-ENTMCNC: 30.1 PG — SIGNIFICANT CHANGE UP (ref 27–34)
MCHC RBC-ENTMCNC: 34.8 GM/DL — SIGNIFICANT CHANGE UP (ref 32–36)
MCV RBC AUTO: 86.7 FL — SIGNIFICANT CHANGE UP (ref 80–100)
MONOCYTES # BLD AUTO: 0.36 K/UL — SIGNIFICANT CHANGE UP (ref 0–0.9)
MONOCYTES NFR BLD AUTO: 8.1 % — SIGNIFICANT CHANGE UP (ref 2–14)
NEUTROPHILS # BLD AUTO: 2.51 K/UL — SIGNIFICANT CHANGE UP (ref 1.8–7.4)
NEUTROPHILS NFR BLD AUTO: 56.5 % — SIGNIFICANT CHANGE UP (ref 43–77)
NITRITE UR-MCNC: NEGATIVE — SIGNIFICANT CHANGE UP
NRBC # BLD: 0 /100 WBCS — SIGNIFICANT CHANGE UP (ref 0–0)
PH UR: 5 — SIGNIFICANT CHANGE UP (ref 5–8)
PLATELET # BLD AUTO: 260 K/UL — SIGNIFICANT CHANGE UP (ref 150–400)
POTASSIUM SERPL-MCNC: 4.5 MMOL/L — SIGNIFICANT CHANGE UP (ref 3.5–5.3)
POTASSIUM SERPL-SCNC: 4.5 MMOL/L — SIGNIFICANT CHANGE UP (ref 3.5–5.3)
PROT UR-MCNC: NEGATIVE MG/DL — SIGNIFICANT CHANGE UP
PROTHROM AB SERPL-ACNC: 11.9 SEC — SIGNIFICANT CHANGE UP (ref 10–12.9)
RBC # BLD: 4.88 M/UL — SIGNIFICANT CHANGE UP (ref 4.2–5.8)
RBC # FLD: 12.6 % — SIGNIFICANT CHANGE UP (ref 10.3–14.5)
SODIUM SERPL-SCNC: 137 MMOL/L — SIGNIFICANT CHANGE UP (ref 135–145)
SP GR SPEC: 1.02 — SIGNIFICANT CHANGE UP (ref 1.01–1.02)
TYPE + AB SCN PNL BLD: SIGNIFICANT CHANGE UP
UROBILINOGEN FLD QL: NEGATIVE MG/DL — SIGNIFICANT CHANGE UP
WBC # BLD: 4.44 K/UL — SIGNIFICANT CHANGE UP (ref 3.8–10.5)
WBC # FLD AUTO: 4.44 K/UL — SIGNIFICANT CHANGE UP (ref 3.8–10.5)

## 2019-03-08 PROCEDURE — 71046 X-RAY EXAM CHEST 2 VIEWS: CPT | Mod: 26

## 2019-03-08 PROCEDURE — 93010 ELECTROCARDIOGRAM REPORT: CPT

## 2019-03-08 NOTE — H&P PST ADULT - HISTORY OF PRESENT ILLNESS
48 years old male with left kidney stone. Patient experienced left flank pain that radiated to left groin. He had sonogram, x -ray and Ct. Scan which indicated kidney stone. Hematuria present. Kidney stone in the past. Planned Lithotripsy.

## 2019-03-08 NOTE — H&P PST ADULT - ASSESSMENT
48 years old male present to PST prior to left extra corporeal lithotripsy with Dr. Medrano.  Plan   1. NPO after midnight  2. Take the following medications with sips of water on the day of procedure: Losartan, Tamsulosin  3. Drink a quart of extra  fluids the day before your surgery.  4. Medical clearance with  Dr. Souza   5. CBC, BMP, PT/PTT/INR, Urinalysis, Urine Culture, Type and Screen sent to lab  6. EKG and CXR done

## 2019-03-08 NOTE — H&P PST ADULT - TEACHING/LEARNING LEARNING PREFERENCES
computer/internet/pictorial/skill demonstration/audio/group instruction/written material/video/individual instruction/verbal instruction

## 2019-03-08 NOTE — H&P PST ADULT - FAMILY HISTORY
Mother  Still living? No  Family history of diabetes mellitus, Age at diagnosis: Age Unknown  Family history of renal failure, Age at diagnosis: Age Unknown

## 2019-03-08 NOTE — H&P PST ADULT - PSH
H/O arthroscopy of knee  Right 2014  H/O cystoscopy  2016  S/P carpal tunnel release  BIlateral 2015

## 2019-03-09 PROBLEM — R10.9 UNSPECIFIED ABDOMINAL PAIN: Chronic | Status: ACTIVE | Noted: 2019-03-08

## 2019-03-09 LAB
CULTURE RESULTS: NO GROWTH — SIGNIFICANT CHANGE UP
SPECIMEN SOURCE: SIGNIFICANT CHANGE UP

## 2019-03-11 ENCOUNTER — APPOINTMENT (OUTPATIENT)
Dept: INTERNAL MEDICINE | Facility: CLINIC | Age: 49
End: 2019-03-11
Payer: COMMERCIAL

## 2019-03-11 VITALS
BODY MASS INDEX: 42.66 KG/M2 | OXYGEN SATURATION: 95 % | HEART RATE: 78 BPM | TEMPERATURE: 98.3 F | HEIGHT: 72 IN | SYSTOLIC BLOOD PRESSURE: 124 MMHG | RESPIRATION RATE: 15 BRPM | WEIGHT: 315 LBS | DIASTOLIC BLOOD PRESSURE: 80 MMHG

## 2019-03-11 DIAGNOSIS — N20.0 CALCULUS OF KIDNEY: ICD-10-CM

## 2019-03-11 DIAGNOSIS — N62 HYPERTROPHY OF BREAST: ICD-10-CM

## 2019-03-11 PROBLEM — E66.9 OBESITY, UNSPECIFIED: Chronic | Status: ACTIVE | Noted: 2019-03-08

## 2019-03-11 PROBLEM — M25.561 PAIN IN RIGHT KNEE: Chronic | Status: ACTIVE | Noted: 2019-03-08

## 2019-03-11 PROBLEM — R09.81 NASAL CONGESTION: Chronic | Status: ACTIVE | Noted: 2019-03-08

## 2019-03-11 PROBLEM — I10 ESSENTIAL (PRIMARY) HYPERTENSION: Chronic | Status: ACTIVE | Noted: 2019-03-08

## 2019-03-11 PROBLEM — J34.2 DEVIATED NASAL SEPTUM: Chronic | Status: ACTIVE | Noted: 2019-03-08

## 2019-03-11 PROBLEM — K64.9 UNSPECIFIED HEMORRHOIDS: Chronic | Status: ACTIVE | Noted: 2019-03-08

## 2019-03-11 PROBLEM — G47.33 OBSTRUCTIVE SLEEP APNEA (ADULT) (PEDIATRIC): Chronic | Status: ACTIVE | Noted: 2019-03-08

## 2019-03-11 PROBLEM — L40.9 PSORIASIS, UNSPECIFIED: Chronic | Status: ACTIVE | Noted: 2019-03-08

## 2019-03-11 PROBLEM — K76.0 FATTY (CHANGE OF) LIVER, NOT ELSEWHERE CLASSIFIED: Chronic | Status: ACTIVE | Noted: 2019-03-08

## 2019-03-11 PROCEDURE — 99214 OFFICE O/P EST MOD 30 MIN: CPT

## 2019-03-11 NOTE — PHYSICAL EXAM
[General Appearance - Alert] : alert [General Appearance - In No Acute Distress] : in no acute distress [Sclera] : the sclera and conjunctiva were normal [PERRL With Normal Accommodation] : pupils were equal in size, round, and reactive to light [Oropharynx] : the oropharynx was normal [Neck Appearance] : the appearance of the neck was normal [Neck Cervical Mass (___cm)] : no neck mass was observed [Jugular Venous Distention Increased] : there was no jugular-venous distention [Auscultation Breath Sounds / Voice Sounds] : lungs were clear to auscultation bilaterally [Heart Rate And Rhythm] : heart rate was normal and rhythm regular [Heart Sounds] : normal S1 and S2 [Heart Sounds Gallop] : no gallops [Murmurs] : no murmurs [Heart Sounds Pericardial Friction Rub] : no pericardial rub [Edema] : there was no peripheral edema [Bowel Sounds] : normal bowel sounds [Abdomen Soft] : soft [Abdomen Tenderness] : non-tender [Abdomen Mass (___ Cm)] : no abdominal mass palpated [Nail Clubbing] : no clubbing  or cyanosis of the fingernails [Musculoskeletal - Swelling] : no joint swelling seen [No Focal Deficits] : no focal deficits [Oriented To Time, Place, And Person] : oriented to person, place, and time [Affect] : the affect was normal [Mood] : the mood was normal [FreeTextEntry1] : no calf tenderness [] : no rash

## 2019-03-11 NOTE — HISTORY OF PRESENT ILLNESS
[Atrial Fibrillation] : no atrial fibrillation [Coronary Artery Disease] : no coronary artery disease [Recent Myocardial Infarction] : no recent myocardial infarction [Implantable Device/Pacemaker] : no implantable device/pacemaker [Asthma] : no asthma [COPD] : no COPD [Sleep Apnea] : sleep apnea [Smoker] : not a smoker [FreeTextEntry1] : 3/15/2019 [FreeTextEntry2] : Lithotripsy [FreeTextEntry3] : Dr Medrano [FreeTextEntry4] : \par \par He states he feels well currently\par \par No chest pain, sob, palpitations, lower extremity edema, VALENTE\par \par He can walk over a mile without any problems. \par \par No hx of surgical or anesthesia complications in past\par \par No easy bruising or bleeding\par \par He states he is still feeling a little depressed.  he has started lexapro and he felt it was helping.  he states he ran out of medication and did not come back in for follow up.

## 2019-03-11 NOTE — REVIEW OF SYSTEMS
[see HPI] : see HPI [Depression] : depression [Fever] : no fever [Chills] : no chills [Feeling Poorly] : not feeling poorly [Feeling Tired] : not feeling tired [Recent Weight Gain (___ Lbs)] : recent [unfilled] ~Ulb weight gain [Recent Weight Loss (___ Lbs)] : no recent weight loss [Chest Pain] : no chest pain [Palpitations] : no palpitations [Lower Ext Edema] : no extremity edema [Shortness Of Breath] : no shortness of breath [Cough] : no cough [Wheezing] : no wheezing [SOB on Exertion] : no shortness of breath during exertion [Abdominal Pain] : no abdominal pain [Vomiting] : no vomiting [Diarrhea] : no diarrhea [Suicidal] : not suicidal [Sleep Disturbances] : no sleep disturbances [Anxiety] : no anxiety [Easy Bleeding] : no tendency for easy bleeding [Easy Bruising] : no tendency for easy bruising [Negative] : Integumentary

## 2019-03-11 NOTE — ASSESSMENT
[FreeTextEntry1] : \par Pre-op evaluation: nephrolithiasis -scheduled for lithotripsy\par -he was seen and examined\par -Pre-op labs from 3/8/2019 reviewed and normal\par -EKG 3/8/2019 sinus bradycardia at 56, no St abnormalities\par -there is no medical contraindication to proposed procedure.  He may proceed with planned lithotripsy\par -he will hold meloxicam/NSAIDS for 1 week prior to procedure\par \par Depression:\par -PHQ 9 score 5\par -I advised he continue lexapro 10mg Po daily  \par -F/U 2-3 months\par -no SI/HI\par \par Arthralgias/joint stiffness:\par -labs reviewed + [facility]  1:80 but all f/u testing negative\par -he has been referred to rheumatology-he states he is taking care of kidney stone first and he will make other appts after this\par \par Dermatitis: ? psoriasis\par -uses clobestasol cream prn\par -he has been referred him to dermatology-he states he is taking care of kidney stone first and he will make other appts after this\par \par Rectal bleeding:\par -no sx currently\par -hx of external hemorrhoids\par -he has been referred to colorectal surgery-he states he is taking care of kidney stone first and he will make other appts after this\par \par Lung nodule:\par -CT chest 1/2019 shows 9mm Right lower lung nodule-previously 6mm \par -I advised repeat CT chest in 6 months\par -I also advised he f/u with Pulmonary whom he has seen in past for PORTILLO-he states he will schedule appts after kidney stone has been taken care of\par \par Left gynecomastia:\par -seen on CT\par -discussed with pt\par -he denies discomfort ot pain\par -will monitor\par -weight loss advised\par \par HTN:\par -BP at goal on losartan 50mg PO daily\par -I adv low fat/low cholesterol diet, low salt diet and weight loss encouraged\par \par PORTILLO:\par -on CPAP-he will f/u with pulmonary to get new supplies\par \par Obesity/Pre-DM:\par -HgA1c 6.0 12/2018\par -low fat/low chol diet and weight loss advised\par -of not he has gained 11 lbs since last visit\par \par Vitamin D Deficiency:\par -I advised ergocalciferol 50,000 units once a week for 12 weeks\par -After finishing 12 week course of ergocalciferol, I adv starting over the counter vitamin D 3 1000 units daily\par -I advised repeat vitamin D 25-OH level at next visit\par \par Elevated LFTs:\par -ALT 52\par -hepatitis B and c serologies were negative\par -has hx of fatty liver\par -weight loss advised\par \par HCM:\par \par CPE 12/31/2018\par \par EKG: 3/2019\par \par Flu shot: 11/2018\par \par Tdap: 12/31/2018\par \par HIV testing: negative 12/2018\par \par Hepatitis C screening: negative 8/2015\par \par PSA: 0.84 12/2018\par \par Depression screening: 3/11/2019 PHQ 9 score 5\par \par F/U 2-3 months

## 2019-03-15 ENCOUNTER — OTHER (OUTPATIENT)
Age: 49
End: 2019-03-15

## 2019-03-15 ENCOUNTER — APPOINTMENT (OUTPATIENT)
Age: 49
End: 2019-03-15

## 2019-03-15 ENCOUNTER — OUTPATIENT (OUTPATIENT)
Dept: OUTPATIENT SERVICES | Facility: HOSPITAL | Age: 49
LOS: 1 days | Discharge: HOME | End: 2019-03-15
Payer: COMMERCIAL

## 2019-03-15 VITALS
RESPIRATION RATE: 18 BRPM | SYSTOLIC BLOOD PRESSURE: 137 MMHG | HEART RATE: 77 BPM | TEMPERATURE: 98 F | OXYGEN SATURATION: 96 % | DIASTOLIC BLOOD PRESSURE: 80 MMHG

## 2019-03-15 VITALS
HEART RATE: 68 BPM | RESPIRATION RATE: 16 BRPM | HEIGHT: 72 IN | SYSTOLIC BLOOD PRESSURE: 136 MMHG | DIASTOLIC BLOOD PRESSURE: 76 MMHG | OXYGEN SATURATION: 97 % | WEIGHT: 315 LBS | TEMPERATURE: 98 F

## 2019-03-15 DIAGNOSIS — Z98.890 OTHER SPECIFIED POSTPROCEDURAL STATES: Chronic | ICD-10-CM

## 2019-03-15 PROCEDURE — 50590 FRAGMENTING OF KIDNEY STONE: CPT | Mod: LT

## 2019-03-15 RX ORDER — ACETAMINOPHEN 500 MG
1000 TABLET ORAL ONCE
Qty: 0 | Refills: 0 | Status: DISCONTINUED | OUTPATIENT
Start: 2019-03-15 | End: 2019-03-15

## 2019-03-15 RX ORDER — KETOROLAC TROMETHAMINE 30 MG/ML
1 SYRINGE (ML) INJECTION
Qty: 15 | Refills: 0
Start: 2019-03-15 | End: 2019-03-19

## 2019-03-15 RX ORDER — SODIUM CHLORIDE 9 MG/ML
1000 INJECTION, SOLUTION INTRAVENOUS
Qty: 0 | Refills: 0 | Status: DISCONTINUED | OUTPATIENT
Start: 2019-03-15 | End: 2019-03-15

## 2019-03-15 RX ORDER — FAMOTIDINE 10 MG/ML
20 INJECTION INTRAVENOUS ONCE
Qty: 0 | Refills: 0 | Status: COMPLETED | OUTPATIENT
Start: 2019-03-15 | End: 2019-03-15

## 2019-03-15 RX ORDER — MEPERIDINE HYDROCHLORIDE 50 MG/ML
12.5 INJECTION INTRAMUSCULAR; INTRAVENOUS; SUBCUTANEOUS
Qty: 0 | Refills: 0 | Status: DISCONTINUED | OUTPATIENT
Start: 2019-03-15 | End: 2019-03-15

## 2019-03-15 RX ORDER — SODIUM CHLORIDE 9 MG/ML
3 INJECTION INTRAMUSCULAR; INTRAVENOUS; SUBCUTANEOUS EVERY 8 HOURS
Qty: 0 | Refills: 0 | Status: DISCONTINUED | OUTPATIENT
Start: 2019-03-15 | End: 2019-03-15

## 2019-03-15 RX ORDER — ONDANSETRON 8 MG/1
4 TABLET, FILM COATED ORAL ONCE
Qty: 0 | Refills: 0 | Status: DISCONTINUED | OUTPATIENT
Start: 2019-03-15 | End: 2019-03-15

## 2019-03-15 RX ORDER — OXYCODONE HYDROCHLORIDE 5 MG/1
5 TABLET ORAL ONCE
Qty: 0 | Refills: 0 | Status: DISCONTINUED | OUTPATIENT
Start: 2019-03-15 | End: 2019-03-15

## 2019-03-15 RX ORDER — FENTANYL CITRATE 50 UG/ML
25 INJECTION INTRAVENOUS
Qty: 0 | Refills: 0 | Status: DISCONTINUED | OUTPATIENT
Start: 2019-03-15 | End: 2019-03-15

## 2019-03-15 RX ORDER — METOCLOPRAMIDE HCL 10 MG
10 TABLET ORAL ONCE
Qty: 0 | Refills: 0 | Status: COMPLETED | OUTPATIENT
Start: 2019-03-15 | End: 2019-03-15

## 2019-03-15 RX ADMIN — Medication 10 MILLIGRAM(S): at 08:13

## 2019-03-15 RX ADMIN — FAMOTIDINE 20 MILLIGRAM(S): 10 INJECTION INTRAVENOUS at 08:13

## 2019-03-15 RX ADMIN — SODIUM CHLORIDE 75 MILLILITER(S): 9 INJECTION, SOLUTION INTRAVENOUS at 10:38

## 2019-03-15 NOTE — ASU PATIENT PROFILE, ADULT - PAIN SCALE PREFERRED, PROFILE
Follow Labor Precautions:  Call MD or return to Labor and Delivery if...    Labor (after 36 weeks)  - Painful contractions every 5 minutes for 2 hours that do not go away with 2 bottles of water, 2 tylenol, and rest.      Labor (before 36 weeks)  - More than 4 contractions in 1 hour   -First try 2 bottles of water, rest, and 2 tylenol.... If the contractions do not go away call doctors office or after hours clinic first and/or come to hospital for evaluation.      Water Breaks  - Gush or leaking of fluid from vagina, or if unsure.     Vaginal bleeding  - Bright red bleeding like a period, soaking a pad in 1 hour.    Decreased or No fetal movement  - You should feel 10 movements within two hours.  -If you are not feeling the baby move.... Drink a glass of orange juice or apple juice  - If you DO NOT feel 10 movements within two hours, please  call the MD or come to the hospital.    Unable to keep fluids or food down for more than 24 hours    Blurred vision, spots before your eyes, dizziness, headache that does not get better with Tylenol (Acetaminophen), bad swelling, chest pain, or trouble breathing.    Drink 10-12 glasses of water daily  Take medications as prescribed  Keep all follow-up appointments   numerical 0-10

## 2019-03-15 NOTE — ASU PATIENT PROFILE, ADULT - PMH
Arthralgia of right knee    Deviated septum    Fatty liver    Flank pain  Left  Hemorrhoids, unspecified hemorrhoid type    Hypertension, unspecified type    Kidney stone on left side    Nasal congestion    Obesity    Obstructive sleep apnea syndrome  CPAP Machine  Psoriasis

## 2019-03-15 NOTE — ASU DISCHARGE PLAN (ADULT/PEDIATRIC) - CARE PROVIDER_API CALL
Kavon Medrano)  Urology  [address] [facility] [facility], [address] [place] [address]  Phone: [phone]  Fax: [phone]  Follow Up Time: 1 month

## 2019-03-15 NOTE — ASU PATIENT PROFILE, ADULT - TEACHING/LEARNING LEARNING PREFERENCES
verbal instruction/written material/computer/internet/group instruction/pictorial/skill demonstration/video/audio/individual instruction

## 2019-03-15 NOTE — ASU DISCHARGE PLAN (ADULT/PEDIATRIC) - CALL YOUR DOCTOR IF YOU HAVE ANY OF THE FOLLOWING:
Fever greater than (need to indicate Fahrenheit or Celsius)/Pain not relieved by Medications/Nausea and vomiting that does not stop/Inability to tolerate liquids or foods

## 2019-03-19 DIAGNOSIS — I10 ESSENTIAL (PRIMARY) HYPERTENSION: ICD-10-CM

## 2019-03-19 DIAGNOSIS — K76.0 FATTY (CHANGE OF) LIVER, NOT ELSEWHERE CLASSIFIED: ICD-10-CM

## 2019-03-19 DIAGNOSIS — Z83.3 FAMILY HISTORY OF DIABETES MELLITUS: ICD-10-CM

## 2019-03-19 DIAGNOSIS — N20.0 CALCULUS OF KIDNEY: ICD-10-CM

## 2019-03-19 DIAGNOSIS — M17.11 UNILATERAL PRIMARY OSTEOARTHRITIS, RIGHT KNEE: ICD-10-CM

## 2019-03-19 DIAGNOSIS — L40.9 PSORIASIS, UNSPECIFIED: ICD-10-CM

## 2019-03-19 DIAGNOSIS — Z99.89 DEPENDENCE ON OTHER ENABLING MACHINES AND DEVICES: ICD-10-CM

## 2019-03-19 DIAGNOSIS — F32.9 MAJOR DEPRESSIVE DISORDER, SINGLE EPISODE, UNSPECIFIED: ICD-10-CM

## 2019-03-19 DIAGNOSIS — E66.9 OBESITY, UNSPECIFIED: ICD-10-CM

## 2019-03-19 DIAGNOSIS — Z87.891 PERSONAL HISTORY OF NICOTINE DEPENDENCE: ICD-10-CM

## 2019-03-19 DIAGNOSIS — G47.33 OBSTRUCTIVE SLEEP APNEA (ADULT) (PEDIATRIC): ICD-10-CM

## 2019-03-19 DIAGNOSIS — Z84.1 FAMILY HISTORY OF DISORDERS OF KIDNEY AND URETER: ICD-10-CM

## 2019-03-19 DIAGNOSIS — Z88.0 ALLERGY STATUS TO PENICILLIN: ICD-10-CM

## 2019-04-05 ENCOUNTER — RX RENEWAL (OUTPATIENT)
Age: 49
End: 2019-04-05

## 2019-04-08 ENCOUNTER — TRANSCRIPTION ENCOUNTER (OUTPATIENT)
Age: 49
End: 2019-04-08

## 2019-04-11 ENCOUNTER — APPOINTMENT (OUTPATIENT)
Age: 49
End: 2019-04-11
Payer: COMMERCIAL

## 2019-04-11 VITALS
DIASTOLIC BLOOD PRESSURE: 79 MMHG | SYSTOLIC BLOOD PRESSURE: 127 MMHG | WEIGHT: 315 LBS | RESPIRATION RATE: 14 BRPM | BODY MASS INDEX: 42.66 KG/M2 | HEIGHT: 72 IN | HEART RATE: 70 BPM

## 2019-04-11 PROCEDURE — 99024 POSTOP FOLLOW-UP VISIT: CPT

## 2019-04-15 NOTE — HISTORY OF PRESENT ILLNESS
[FreeTextEntry1] : 49 yo male presents for follow up. \par s/p Left ESWL on 3/15/19 at [facility]. \par Did not see stone particles in urine. \par Denies any difficulty with urination. \par Denies dysuria, hematuria, lower abdominal or flank pain, fever, chills or rigors. \par \par Initially seen for Kidney stone. \par Had seen another Urologist in the past and underwent Cystoscopy for Microhematuria and Prostate biopsy for Prostate nodule.

## 2019-04-15 NOTE — ASSESSMENT
[FreeTextEntry1] : Kidney stone:\par s/p Left ESWL on 3/15/19 at [facility]. \par Renal Ultrasound: calculus or hydronephrosis. \par KUB: Left Ureteral stone. \par \par Continue Flomax. \par Strain urine. \par Repeat  KUB in 4 weeks with 2 days of Milk of Magnesia. \par Will inform results.

## 2019-04-15 NOTE — PHYSICAL EXAM
[] : no respiratory distress [General Appearance - In No Acute Distress] : no acute distress [Oriented To Time, Place, And Person] : oriented to person, place, and time [Normal Station and Gait] : the gait and station were normal for the patient's age

## 2019-06-11 ENCOUNTER — RX RENEWAL (OUTPATIENT)
Age: 49
End: 2019-06-11

## 2019-06-26 ENCOUNTER — APPOINTMENT (OUTPATIENT)
Dept: INTERNAL MEDICINE | Facility: CLINIC | Age: 49
End: 2019-06-26
Payer: COMMERCIAL

## 2019-06-26 VITALS
OXYGEN SATURATION: 96 % | BODY MASS INDEX: 42.66 KG/M2 | HEART RATE: 88 BPM | RESPIRATION RATE: 15 BRPM | SYSTOLIC BLOOD PRESSURE: 136 MMHG | DIASTOLIC BLOOD PRESSURE: 80 MMHG | WEIGHT: 315 LBS | TEMPERATURE: 98.1 F | HEIGHT: 72 IN

## 2019-06-26 PROCEDURE — 99214 OFFICE O/P EST MOD 30 MIN: CPT

## 2019-06-26 RX ORDER — CYCLOBENZAPRINE HYDROCHLORIDE 10 MG/1
10 TABLET, FILM COATED ORAL
Qty: 90 | Refills: 1 | Status: DISCONTINUED | COMMUNITY
Start: 2018-12-31 | End: 2019-06-26

## 2019-06-26 RX ORDER — ERGOCALCIFEROL 1.25 MG/1
1.25 MG CAPSULE, LIQUID FILLED ORAL
Qty: 12 | Refills: 0 | Status: DISCONTINUED | COMMUNITY
Start: 2019-01-06 | End: 2019-06-26

## 2019-06-26 NOTE — COUNSELING
[Weight management counseling provided] : Weight management [Healthy eating counseling provided] : healthy eating [Low Fat Diet] : Low fat diet [Low Salt Diet] : Low salt diet [Walking] : Walking [Decrease Portions] : Decrease food portions [Activity counseling provided] : activity [Good understanding] : Patient has a good understanding of disease, goals and obesity follow-up plan

## 2019-07-02 ENCOUNTER — OUTPATIENT (OUTPATIENT)
Dept: OUTPATIENT SERVICES | Facility: HOSPITAL | Age: 49
LOS: 1 days | Discharge: HOME | End: 2019-07-02

## 2019-07-02 VITALS
DIASTOLIC BLOOD PRESSURE: 70 MMHG | TEMPERATURE: 98 F | RESPIRATION RATE: 18 BRPM | WEIGHT: 311.95 LBS | HEIGHT: 72 IN | SYSTOLIC BLOOD PRESSURE: 132 MMHG | HEART RATE: 68 BPM

## 2019-07-02 DIAGNOSIS — I10 ESSENTIAL (PRIMARY) HYPERTENSION: ICD-10-CM

## 2019-07-02 DIAGNOSIS — G47.33 OBSTRUCTIVE SLEEP APNEA (ADULT) (PEDIATRIC): ICD-10-CM

## 2019-07-02 DIAGNOSIS — Z98.890 OTHER SPECIFIED POSTPROCEDURAL STATES: Chronic | ICD-10-CM

## 2019-07-02 DIAGNOSIS — N20.0 CALCULUS OF KIDNEY: ICD-10-CM

## 2019-07-02 LAB
ANION GAP SERPL CALC-SCNC: 6 MMOL/L — SIGNIFICANT CHANGE UP (ref 5–17)
APPEARANCE UR: CLEAR — SIGNIFICANT CHANGE UP
APTT BLD: 29.5 SEC — SIGNIFICANT CHANGE UP (ref 27.5–36.3)
BASOPHILS # BLD AUTO: 0.02 K/UL — SIGNIFICANT CHANGE UP (ref 0–0.2)
BASOPHILS NFR BLD AUTO: 0.4 % — SIGNIFICANT CHANGE UP (ref 0–2)
BILIRUB UR-MCNC: NEGATIVE — SIGNIFICANT CHANGE UP
BUN SERPL-MCNC: 12 MG/DL — SIGNIFICANT CHANGE UP (ref 7–23)
CALCIUM SERPL-MCNC: 9.2 MG/DL — SIGNIFICANT CHANGE UP (ref 8.5–10.1)
CHLORIDE SERPL-SCNC: 106 MMOL/L — SIGNIFICANT CHANGE UP (ref 96–108)
CO2 SERPL-SCNC: 30 MMOL/L — SIGNIFICANT CHANGE UP (ref 22–31)
COLOR SPEC: YELLOW — SIGNIFICANT CHANGE UP
CREAT SERPL-MCNC: 1.1 MG/DL — SIGNIFICANT CHANGE UP (ref 0.5–1.3)
DIFF PNL FLD: ABNORMAL
EOSINOPHIL # BLD AUTO: 0.29 K/UL — SIGNIFICANT CHANGE UP (ref 0–0.5)
EOSINOPHIL NFR BLD AUTO: 5.3 % — SIGNIFICANT CHANGE UP (ref 0–6)
GLUCOSE SERPL-MCNC: 102 MG/DL — HIGH (ref 70–99)
GLUCOSE UR QL: NEGATIVE MG/DL — SIGNIFICANT CHANGE UP
HCT VFR BLD CALC: 42.3 % — SIGNIFICANT CHANGE UP (ref 39–50)
HGB BLD-MCNC: 14.8 G/DL — SIGNIFICANT CHANGE UP (ref 13–17)
IMM GRANULOCYTES NFR BLD AUTO: 0.2 % — SIGNIFICANT CHANGE UP (ref 0–1.5)
INR BLD: 1.1 RATIO — SIGNIFICANT CHANGE UP (ref 0.88–1.16)
KETONES UR-MCNC: NEGATIVE — SIGNIFICANT CHANGE UP
LEUKOCYTE ESTERASE UR-ACNC: NEGATIVE — SIGNIFICANT CHANGE UP
LYMPHOCYTES # BLD AUTO: 1.4 K/UL — SIGNIFICANT CHANGE UP (ref 1–3.3)
LYMPHOCYTES # BLD AUTO: 25.4 % — SIGNIFICANT CHANGE UP (ref 13–44)
MCHC RBC-ENTMCNC: 30.4 PG — SIGNIFICANT CHANGE UP (ref 27–34)
MCHC RBC-ENTMCNC: 35 GM/DL — SIGNIFICANT CHANGE UP (ref 32–36)
MCV RBC AUTO: 86.9 FL — SIGNIFICANT CHANGE UP (ref 80–100)
MONOCYTES # BLD AUTO: 0.51 K/UL — SIGNIFICANT CHANGE UP (ref 0–0.9)
MONOCYTES NFR BLD AUTO: 9.3 % — SIGNIFICANT CHANGE UP (ref 2–14)
NEUTROPHILS # BLD AUTO: 3.28 K/UL — SIGNIFICANT CHANGE UP (ref 1.8–7.4)
NEUTROPHILS NFR BLD AUTO: 59.4 % — SIGNIFICANT CHANGE UP (ref 43–77)
NITRITE UR-MCNC: NEGATIVE — SIGNIFICANT CHANGE UP
PH UR: 6.5 — SIGNIFICANT CHANGE UP (ref 5–8)
PLATELET # BLD AUTO: 268 K/UL — SIGNIFICANT CHANGE UP (ref 150–400)
POTASSIUM SERPL-MCNC: 4.1 MMOL/L — SIGNIFICANT CHANGE UP (ref 3.5–5.3)
POTASSIUM SERPL-SCNC: 4.1 MMOL/L — SIGNIFICANT CHANGE UP (ref 3.5–5.3)
PROT UR-MCNC: 15 MG/DL
PROTHROM AB SERPL-ACNC: 12.2 SEC — SIGNIFICANT CHANGE UP (ref 10–12.9)
RBC # BLD: 4.87 M/UL — SIGNIFICANT CHANGE UP (ref 4.2–5.8)
RBC # FLD: 12.8 % — SIGNIFICANT CHANGE UP (ref 10.3–14.5)
SODIUM SERPL-SCNC: 142 MMOL/L — SIGNIFICANT CHANGE UP (ref 135–145)
SP GR SPEC: 1.01 — SIGNIFICANT CHANGE UP (ref 1.01–1.02)
TYPE + AB SCN PNL BLD: SIGNIFICANT CHANGE UP
UROBILINOGEN FLD QL: 1 MG/DL
WBC # BLD: 5.51 K/UL — SIGNIFICANT CHANGE UP (ref 3.8–10.5)
WBC # FLD AUTO: 5.51 K/UL — SIGNIFICANT CHANGE UP (ref 3.8–10.5)

## 2019-07-02 RX ORDER — ERGOCALCIFEROL 1.25 MG/1
1 CAPSULE ORAL
Qty: 0 | Refills: 0 | DISCHARGE

## 2019-07-02 RX ORDER — CYCLOBENZAPRINE HYDROCHLORIDE 10 MG/1
1 TABLET, FILM COATED ORAL
Qty: 0 | Refills: 0 | DISCHARGE

## 2019-07-02 NOTE — H&P PST ADULT - MUSCULOSKELETAL
detailed exam no joint erythema/ROM intact/normal strength/no joint swelling/no joint warmth details…

## 2019-07-02 NOTE — H&P PST ADULT - NSICDXPASTMEDICALHX_GEN_ALL_CORE_FT
PAST MEDICAL HISTORY:  Arthralgia of right knee     Deviated septum     Fatty liver     Flank pain Left    Hemorrhoids, unspecified hemorrhoid type     Hypertension, unspecified type     Kidney stone on left side     Nasal congestion     Obesity     Obstructive sleep apnea syndrome CPAP Machine    Psoriasis

## 2019-07-02 NOTE — H&P PST ADULT - ASSESSMENT
47 y/o male with hx of HTn presents to [facility] for scheduled left ureteroscopy laser lithotripsy stone extraction and ureteral stents on 7/9/19.

## 2019-07-02 NOTE — H&P PST ADULT - HISTORY OF PRESENT ILLNESS
47 y/o male with hx of HTn presents to [facility] for scheduled left ureteroscopy laser lithotripsy stone extraction and ureteral stents on 7/9/19. Patient reports hx of 8mm kidney stone seen several urologist and placed on medications with no relief, no advised to have stent and lithotripsy done.

## 2019-07-02 NOTE — H&P PST ADULT - NSICDXPROBLEM_GEN_ALL_CORE_FT
PROBLEM DIAGNOSES  Problem: Kidney stone on left side  Assessment and Plan: Pre op instructions given and explained.  Avoid NSAIDs and OTC supplements.   Patient verbalized understanding  medical clearance completed awaiting     Problem: Hypertension, unspecified type  Assessment and Plan: Cardiac meds am of surgery with sip of water   Patient verbalized understanding.     Problem: Obstructive sleep apnea syndrome  Assessment and Plan: Bring on the day of surgery

## 2019-07-02 NOTE — H&P PST ADULT - NSICDXFAMILYHX_GEN_ALL_CORE_FT
FAMILY HISTORY:  Mother  Still living? No  Family history of diabetes mellitus, Age at diagnosis: Age Unknown  Family history of renal failure, Age at diagnosis: Age Unknown

## 2019-07-02 NOTE — H&P PST ADULT - NSICDXPASTSURGICALHX_GEN_ALL_CORE_FT
PAST SURGICAL HISTORY:  H/O arthroscopy of knee Right 2014    H/O cystoscopy 2016    S/P carpal tunnel release BIlateral 2015

## 2019-07-03 LAB
CULTURE RESULTS: NO GROWTH — SIGNIFICANT CHANGE UP
SPECIMEN SOURCE: SIGNIFICANT CHANGE UP

## 2019-07-08 NOTE — PHYSICAL EXAM
[No Acute Distress] : no acute distress [Normal Voice/Communication] : normal voice/communication [Well-Appearing] : well-appearing [Normal Sclera/Conjunctiva] : normal sclera/conjunctiva [PERRL] : pupils equal round and reactive to light [Normal Oropharynx] : the oropharynx was normal [No JVD] : no jugular venous distention [Supple] : supple [No Lymphadenopathy] : no lymphadenopathy [Thyroid Normal, No Nodules] : the thyroid was normal and there were no nodules present [No Accessory Muscle Use] : no accessory muscle use [Clear to Auscultation] : lungs were clear to auscultation bilaterally [No Respiratory Distress] : no respiratory distress  [Normal Rate] : normal rate  [Regular Rhythm] : with a regular rhythm [No Murmur] : no murmur heard [Normal S1, S2] : normal S1 and S2 [No Extremity Clubbing/Cyanosis] : no extremity clubbing/cyanosis [No Edema] : there was no peripheral edema [Non Tender] : non-tender [Soft] : abdomen soft [Non-distended] : non-distended [No Masses] : no abdominal mass palpated [No HSM] : no HSM [Normal Bowel Sounds] : normal bowel sounds [No Rash] : no rash [No Focal Deficits] : no focal deficits [Normal Mood] : the mood was normal [Alert and Oriented x3] : oriented to person, place, and time [Normal Affect] : the affect was normal [Normal Insight/Judgement] : insight and judgment were intact [No Skin Lesions] : no skin lesions [de-identified] : Obese

## 2019-07-08 NOTE — ASSESSMENT
[FreeTextEntry1] : \par Pre-op evaluation: nephrolithiasis -scheduled for ureteroscopy with stent placement\par -he was seen and examined\par -Pre-op testing to be done 7/2/2019\par -EKG 3/8/2019 sinus bradycardia at 56, no ST abnormalities\par -he will hold meloxicam/NSAIDS for 1 week prior to procedure\par \par Depression:\par -he states he is doing well on lexapro 10mg 1/2 tab Po daily -he states he only takes 1/2 tab daily\par \par Arthralgias/joint stiffness:\par - + ZAHRA  1:80 but all f/u testing negative\par -he has been referred to rheumatology-he states he is taking care of kidney stone first and he will make other appts after this\par \par Dermatitis: ? psoriasis\par -uses clobestasol cream prn\par -he has been referred him to dermatology-he states he is taking care of kidney stone first and he will make other appts after this\par \par Rectal bleeding:\par -no sx currently\par -hx of external hemorrhoids\par -he has been referred to colorectal surgery-he states he is taking care of kidney stone first and he will make other appts after this\par \par Lung nodule:\par -CT chest 1/2019 shows 9 mm Right lower lung nodule-previously 6mm \par -recent CT abd/pelvis showed 6 mm RLL lung nodule\par -I advised he repeat CT chest in 6 months 11/2019\par -I also advised he f/u with Pulmonary whom he has seen in past for PORTILLO-he again states he will schedule appts after kidney stone has been taken care of\par \par Left gynecomastia:\par -seen on CT\par -he denies discomfort or pain\par -will monitor\par -weight loss advised\par \par HTN:\par -BP near goal on losartan 50mg PO daily\par \par PORTILLO:\par -on CPAP-he will f/u with pulmonary to get new supplies\par \par Obesity/Pre-DM:\par -HgA1c 6.0 12/2018\par -low fat/low chol diet and weight loss advised\par -he has lost 10lbs since last visit exercising and eating healthier\par \par Vitamin D Deficiency:\par -I advised vitamin D 3 1000 units daily\par \par Elevated LFTs:\par -ALT 52\par -hepatitis B and c serologies were negative\par -has hx of fatty liver\par -weight loss advised\par \par HCM:\par \par CPE 12/31/2018\par \par EKG: 3/2019\par \par Flu shot: 11/2018\par \par Tdap: 12/31/2018\par \par HIV testing: negative 12/2018\par \par Hepatitis C screening: negative 8/2015\par \par PSA: 0.84 12/2018\par \par Depression screening: 3/11/2019 PHQ 9 score 5\par \par F/U 3 months-he will have [facility] send pre-op testing once completed

## 2019-07-08 NOTE — HISTORY OF PRESENT ILLNESS
[FreeTextEntry1] : Ureteroscopy with stent [FreeTextEntry2] : 7/8/2019 [FreeTextEntry3] : Dr. Medrano [FreeTextEntry4] : \par Pt is here today for pre-op evaluation. He will be getting a uteroscopy with possible stent placement on 7/8/2019 at [facility]. \par \par He will be having pre-surgical testing at [facility] 7/2/2019.\par \par He denies  CP, palpitations, VALENTE, and SOB \par \par He states he goes on elliptical machine for 30-45 min without any difficulty\par \par No Hx of surgical or anesthesia complications\par \par No easy bleeding or bruising\par \par He denies fever/chills\par

## 2019-07-08 NOTE — REVIEW OF SYSTEMS
[Recent Change In Weight] : ~T recent weight change [Negative] : Neurological [Fever] : no fever [Chills] : no chills [Fatigue] : no fatigue [Chest Pain] : no chest pain [Lower Ext Edema] : no lower extremity edema [Palpitations] : no palpitations [Shortness Of Breath] : no shortness of breath [Wheezing] : no wheezing [Cough] : no cough [Dyspnea on Exertion] : not dyspnea on exertion [Nausea] : no nausea [Abdominal Pain] : no abdominal pain [Diarrhea] : no diarrhea [Vomiting] : no vomiting [Skin Rash] : no skin rash [Dysuria] : no dysuria [Easy Bleeding] : no easy bleeding [Easy Bruising] : no easy bruising [FreeTextEntry2] : lost 10 Ibs

## 2019-07-08 NOTE — END OF VISIT
[FreeTextEntry3] : All medical entries made by the Scribe were at my, Dr. Real Souza's, direction and personally dictated by me on [6/26/2019]. I have reviewed the chart and agree that the record accurately reflects my personal performance of the history, physical exam, assessment and plan. I have also personally directed, reviewed, and agreed with the chart.\par

## 2019-07-08 NOTE — ADDENDUM
[FreeTextEntry1] : I, Marlyn Yao, acted solely as a scribe for Dr. Souza on this date [6/26/2019].\par \par 7/8/2019\par \par I received pre-op labs from 7/2/2019 cbc-normal, UA 15 protein\par \par Pre-op labs from 3/2019 reviewed again\par \par Called Hospital pre-op department -No repeat EKG was done-they stated he had one 38/2019 so repeat not done\par \par EKG from 3/8/2019 sinus bradycardia at 56n ho ST abnormalities\par \par There is no medical contraindication to proposed procedure.  he may proceed with planned procedure\par

## 2019-07-09 ENCOUNTER — OTHER (OUTPATIENT)
Age: 49
End: 2019-07-09

## 2019-07-09 ENCOUNTER — APPOINTMENT (OUTPATIENT)
Dept: UROLOGY | Facility: HOSPITAL | Age: 49
End: 2019-07-09

## 2019-07-09 ENCOUNTER — OUTPATIENT (OUTPATIENT)
Dept: OUTPATIENT SERVICES | Facility: HOSPITAL | Age: 49
LOS: 1 days | Discharge: HOME | End: 2019-07-09
Payer: COMMERCIAL

## 2019-07-09 VITALS
SYSTOLIC BLOOD PRESSURE: 105 MMHG | DIASTOLIC BLOOD PRESSURE: 55 MMHG | TEMPERATURE: 98 F | RESPIRATION RATE: 16 BRPM | HEART RATE: 51 BPM | OXYGEN SATURATION: 98 %

## 2019-07-09 VITALS — WEIGHT: 311.95 LBS | OXYGEN SATURATION: 96 % | HEIGHT: 72 IN | TEMPERATURE: 98 F

## 2019-07-09 DIAGNOSIS — Z98.890 OTHER SPECIFIED POSTPROCEDURAL STATES: Chronic | ICD-10-CM

## 2019-07-09 PROCEDURE — 52332 CYSTOSCOPY AND TREATMENT: CPT | Mod: LT

## 2019-07-09 RX ORDER — ONDANSETRON 8 MG/1
4 TABLET, FILM COATED ORAL ONCE
Refills: 0 | Status: DISCONTINUED | OUTPATIENT
Start: 2019-07-09 | End: 2019-07-09

## 2019-07-09 RX ORDER — PHENAZOPYRIDINE HCL 100 MG
1 TABLET ORAL
Qty: 9 | Refills: 0
Start: 2019-07-09 | End: 2019-07-11

## 2019-07-09 RX ORDER — MEPERIDINE HYDROCHLORIDE 50 MG/ML
12.5 INJECTION INTRAMUSCULAR; INTRAVENOUS; SUBCUTANEOUS
Refills: 0 | Status: DISCONTINUED | OUTPATIENT
Start: 2019-07-09 | End: 2019-07-09

## 2019-07-09 RX ORDER — PHENAZOPYRIDINE HCL 100 MG
200 TABLET ORAL ONCE
Refills: 0 | Status: COMPLETED | OUTPATIENT
Start: 2019-07-09 | End: 2019-07-09

## 2019-07-09 RX ORDER — FENTANYL CITRATE 50 UG/ML
50 INJECTION INTRAVENOUS
Refills: 0 | Status: DISCONTINUED | OUTPATIENT
Start: 2019-07-09 | End: 2019-07-09

## 2019-07-09 RX ORDER — OXYCODONE HYDROCHLORIDE 5 MG/1
5 TABLET ORAL ONCE
Refills: 0 | Status: DISCONTINUED | OUTPATIENT
Start: 2019-07-09 | End: 2019-07-09

## 2019-07-09 RX ORDER — SODIUM CHLORIDE 9 MG/ML
1000 INJECTION, SOLUTION INTRAVENOUS
Refills: 0 | Status: DISCONTINUED | OUTPATIENT
Start: 2019-07-09 | End: 2019-07-09

## 2019-07-09 RX ADMIN — Medication 200 MILLIGRAM(S): at 09:05

## 2019-07-09 NOTE — BRIEF OPERATIVE NOTE - NSICDXBRIEFPROCEDURE_GEN_ALL_CORE_FT
PROCEDURES:  Cystoscopy with insertion of ureteral stent in adult 09-Jul-2019 08:45:59 Left Kavon Medrano S

## 2019-07-12 DIAGNOSIS — Z99.89 DEPENDENCE ON OTHER ENABLING MACHINES AND DEVICES: ICD-10-CM

## 2019-07-12 DIAGNOSIS — E66.9 OBESITY, UNSPECIFIED: ICD-10-CM

## 2019-07-12 DIAGNOSIS — G89.29 OTHER CHRONIC PAIN: ICD-10-CM

## 2019-07-12 DIAGNOSIS — E29.1 TESTICULAR HYPOFUNCTION: ICD-10-CM

## 2019-07-12 DIAGNOSIS — M50.20 OTHER CERVICAL DISC DISPLACEMENT, UNSPECIFIED CERVICAL REGION: ICD-10-CM

## 2019-07-12 DIAGNOSIS — F41.9 ANXIETY DISORDER, UNSPECIFIED: ICD-10-CM

## 2019-07-12 DIAGNOSIS — F32.9 MAJOR DEPRESSIVE DISORDER, SINGLE EPISODE, UNSPECIFIED: ICD-10-CM

## 2019-07-12 DIAGNOSIS — N62 HYPERTROPHY OF BREAST: ICD-10-CM

## 2019-07-12 DIAGNOSIS — I10 ESSENTIAL (PRIMARY) HYPERTENSION: ICD-10-CM

## 2019-07-12 DIAGNOSIS — Z88.0 ALLERGY STATUS TO PENICILLIN: ICD-10-CM

## 2019-07-12 DIAGNOSIS — N20.0 CALCULUS OF KIDNEY: ICD-10-CM

## 2019-07-12 DIAGNOSIS — G47.33 OBSTRUCTIVE SLEEP APNEA (ADULT) (PEDIATRIC): ICD-10-CM

## 2019-07-12 DIAGNOSIS — N40.0 BENIGN PROSTATIC HYPERPLASIA WITHOUT LOWER URINARY TRACT SYMPTOMS: ICD-10-CM

## 2019-07-12 DIAGNOSIS — Z87.891 PERSONAL HISTORY OF NICOTINE DEPENDENCE: ICD-10-CM

## 2019-07-12 DIAGNOSIS — R91.1 SOLITARY PULMONARY NODULE: ICD-10-CM

## 2019-07-12 DIAGNOSIS — E55.9 VITAMIN D DEFICIENCY, UNSPECIFIED: ICD-10-CM

## 2019-07-13 ENCOUNTER — RX RENEWAL (OUTPATIENT)
Age: 49
End: 2019-07-13

## 2019-07-17 ENCOUNTER — LABORATORY RESULT (OUTPATIENT)
Age: 49
End: 2019-07-17

## 2019-07-18 ENCOUNTER — LABORATORY RESULT (OUTPATIENT)
Age: 49
End: 2019-07-18

## 2019-07-18 LAB
APPEARANCE: ABNORMAL
BILIRUBIN URINE: NEGATIVE
BLOOD URINE: ABNORMAL
COLOR: NORMAL
GLUCOSE QUALITATIVE U: NEGATIVE
KETONES URINE: NEGATIVE
LEUKOCYTE ESTERASE URINE: ABNORMAL
NITRITE URINE: NEGATIVE
PH URINE: 6
PROTEIN URINE: ABNORMAL
SPECIFIC GRAVITY URINE: 1.02
UROBILINOGEN URINE: NORMAL

## 2019-07-18 RX ORDER — FENTANYL CITRATE 50 UG/ML
50 INJECTION INTRAVENOUS
Refills: 0 | Status: DISCONTINUED | OUTPATIENT
Start: 2019-07-19 | End: 2019-07-19

## 2019-07-18 RX ORDER — ONDANSETRON 8 MG/1
4 TABLET, FILM COATED ORAL ONCE
Refills: 0 | Status: DISCONTINUED | OUTPATIENT
Start: 2019-07-19 | End: 2019-07-19

## 2019-07-18 RX ORDER — SODIUM CHLORIDE 9 MG/ML
1000 INJECTION INTRAMUSCULAR; INTRAVENOUS; SUBCUTANEOUS
Refills: 0 | Status: DISCONTINUED | OUTPATIENT
Start: 2019-07-19 | End: 2019-07-19

## 2019-07-19 ENCOUNTER — APPOINTMENT (OUTPATIENT)
Dept: UROLOGY | Facility: HOSPITAL | Age: 49
End: 2019-07-19

## 2019-07-19 ENCOUNTER — RESULT REVIEW (OUTPATIENT)
Age: 49
End: 2019-07-19

## 2019-07-19 ENCOUNTER — OTHER (OUTPATIENT)
Age: 49
End: 2019-07-19

## 2019-07-19 ENCOUNTER — OUTPATIENT (OUTPATIENT)
Dept: OUTPATIENT SERVICES | Facility: HOSPITAL | Age: 49
LOS: 1 days | Discharge: HOME | End: 2019-07-19
Payer: COMMERCIAL

## 2019-07-19 VITALS
RESPIRATION RATE: 16 BRPM | OXYGEN SATURATION: 96 % | WEIGHT: 311.95 LBS | HEART RATE: 62 BPM | SYSTOLIC BLOOD PRESSURE: 123 MMHG | TEMPERATURE: 98 F | DIASTOLIC BLOOD PRESSURE: 73 MMHG

## 2019-07-19 VITALS
HEART RATE: 52 BPM | TEMPERATURE: 97 F | SYSTOLIC BLOOD PRESSURE: 138 MMHG | DIASTOLIC BLOOD PRESSURE: 82 MMHG | RESPIRATION RATE: 15 BRPM | OXYGEN SATURATION: 98 %

## 2019-07-19 DIAGNOSIS — Z98.890 OTHER SPECIFIED POSTPROCEDURAL STATES: Chronic | ICD-10-CM

## 2019-07-19 PROCEDURE — 88300 SURGICAL PATH GROSS: CPT | Mod: 26

## 2019-07-19 PROCEDURE — 52356 CYSTO/URETERO W/LITHOTRIPSY: CPT | Mod: LT

## 2019-07-19 RX ORDER — ACETAMINOPHEN 500 MG
1000 TABLET ORAL ONCE
Refills: 0 | Status: COMPLETED | OUTPATIENT
Start: 2019-07-19 | End: 2019-07-19

## 2019-07-19 RX ORDER — PHENAZOPYRIDINE HCL 100 MG
1 TABLET ORAL
Qty: 9 | Refills: 0
Start: 2019-07-19 | End: 2019-07-21

## 2019-07-19 RX ORDER — PHENAZOPYRIDINE HCL 100 MG
200 TABLET ORAL ONCE
Refills: 0 | Status: COMPLETED | OUTPATIENT
Start: 2019-07-19 | End: 2019-07-19

## 2019-07-19 RX ORDER — OXYCODONE HYDROCHLORIDE 5 MG/1
5 TABLET ORAL ONCE
Refills: 0 | Status: DISCONTINUED | OUTPATIENT
Start: 2019-07-19 | End: 2019-07-19

## 2019-07-19 RX ADMIN — OXYCODONE HYDROCHLORIDE 5 MILLIGRAM(S): 5 TABLET ORAL at 12:15

## 2019-07-19 RX ADMIN — Medication 200 MILLIGRAM(S): at 11:37

## 2019-07-19 RX ADMIN — FENTANYL CITRATE 50 MICROGRAM(S): 50 INJECTION INTRAVENOUS at 11:47

## 2019-07-19 RX ADMIN — FENTANYL CITRATE 50 MICROGRAM(S): 50 INJECTION INTRAVENOUS at 11:45

## 2019-07-19 RX ADMIN — Medication 400 MILLIGRAM(S): at 11:40

## 2019-07-19 RX ADMIN — Medication 1000 MILLIGRAM(S): at 12:30

## 2019-07-19 RX ADMIN — OXYCODONE HYDROCHLORIDE 5 MILLIGRAM(S): 5 TABLET ORAL at 12:19

## 2019-07-19 NOTE — BRIEF OPERATIVE NOTE - NSICDXBRIEFPROCEDURE_GEN_ALL_CORE_FT
PROCEDURES:  Ureteroscopy with laser lithotripsy and stent placement 19-Jul-2019 11:33:41 Left Kavon Medrano

## 2019-07-19 NOTE — ASU DISCHARGE PLAN (ADULT/PEDIATRIC) - CALL YOUR DOCTOR IF YOU HAVE ANY OF THE FOLLOWING:
Nausea and vomiting that does not stop/Inability to tolerate liquids or foods/Fever greater than (need to indicate Fahrenheit or Celsius)/Pain not relieved by Medications

## 2019-07-22 LAB — SURGICAL PATHOLOGY STUDY: SIGNIFICANT CHANGE UP

## 2019-07-23 LAB — COMPN STONE: SIGNIFICANT CHANGE UP

## 2019-07-24 DIAGNOSIS — I10 ESSENTIAL (PRIMARY) HYPERTENSION: ICD-10-CM

## 2019-07-24 DIAGNOSIS — M50.20 OTHER CERVICAL DISC DISPLACEMENT, UNSPECIFIED CERVICAL REGION: ICD-10-CM

## 2019-07-24 DIAGNOSIS — N41.9 INFLAMMATORY DISEASE OF PROSTATE, UNSPECIFIED: ICD-10-CM

## 2019-07-24 DIAGNOSIS — N20.0 CALCULUS OF KIDNEY: ICD-10-CM

## 2019-07-24 DIAGNOSIS — E29.1 TESTICULAR HYPOFUNCTION: ICD-10-CM

## 2019-07-24 DIAGNOSIS — E66.9 OBESITY, UNSPECIFIED: ICD-10-CM

## 2019-07-24 DIAGNOSIS — K76.0 FATTY (CHANGE OF) LIVER, NOT ELSEWHERE CLASSIFIED: ICD-10-CM

## 2019-07-24 DIAGNOSIS — L40.9 PSORIASIS, UNSPECIFIED: ICD-10-CM

## 2019-07-24 DIAGNOSIS — Z87.891 PERSONAL HISTORY OF NICOTINE DEPENDENCE: ICD-10-CM

## 2019-07-24 DIAGNOSIS — Z83.3 FAMILY HISTORY OF DIABETES MELLITUS: ICD-10-CM

## 2019-07-24 DIAGNOSIS — L30.9 DERMATITIS, UNSPECIFIED: ICD-10-CM

## 2019-07-24 DIAGNOSIS — F32.9 MAJOR DEPRESSIVE DISORDER, SINGLE EPISODE, UNSPECIFIED: ICD-10-CM

## 2019-07-24 DIAGNOSIS — R73.03 PREDIABETES: ICD-10-CM

## 2019-07-24 DIAGNOSIS — M17.11 UNILATERAL PRIMARY OSTEOARTHRITIS, RIGHT KNEE: ICD-10-CM

## 2019-07-24 DIAGNOSIS — G47.33 OBSTRUCTIVE SLEEP APNEA (ADULT) (PEDIATRIC): ICD-10-CM

## 2019-07-24 DIAGNOSIS — G89.29 OTHER CHRONIC PAIN: ICD-10-CM

## 2019-07-24 DIAGNOSIS — E55.9 VITAMIN D DEFICIENCY, UNSPECIFIED: ICD-10-CM

## 2019-07-24 DIAGNOSIS — Z88.0 ALLERGY STATUS TO PENICILLIN: ICD-10-CM

## 2019-07-24 DIAGNOSIS — N40.0 BENIGN PROSTATIC HYPERPLASIA WITHOUT LOWER URINARY TRACT SYMPTOMS: ICD-10-CM

## 2019-07-31 ENCOUNTER — APPOINTMENT (OUTPATIENT)
Dept: UROLOGY | Facility: CLINIC | Age: 49
End: 2019-07-31
Payer: COMMERCIAL

## 2019-07-31 VITALS
HEART RATE: 84 BPM | SYSTOLIC BLOOD PRESSURE: 131 MMHG | TEMPERATURE: 98 F | BODY MASS INDEX: 41.85 KG/M2 | DIASTOLIC BLOOD PRESSURE: 79 MMHG | HEIGHT: 72 IN | WEIGHT: 309 LBS | OXYGEN SATURATION: 95 %

## 2019-07-31 DIAGNOSIS — Z46.6 ENCOUNTER FOR FITTING AND ADJUSTMENT OF URINARY DEVICE: ICD-10-CM

## 2019-07-31 PROCEDURE — 52310 CYSTOSCOPY AND TREATMENT: CPT

## 2019-08-27 ENCOUNTER — RX RENEWAL (OUTPATIENT)
Age: 49
End: 2019-08-27

## 2019-09-06 ENCOUNTER — RX RENEWAL (OUTPATIENT)
Age: 49
End: 2019-09-06

## 2019-09-27 ENCOUNTER — RX RENEWAL (OUTPATIENT)
Age: 49
End: 2019-09-27

## 2019-10-12 ENCOUNTER — APPOINTMENT (OUTPATIENT)
Dept: INTERNAL MEDICINE | Facility: CLINIC | Age: 49
End: 2019-10-12
Payer: COMMERCIAL

## 2019-10-12 VITALS
HEART RATE: 50 BPM | HEIGHT: 72 IN | OXYGEN SATURATION: 97 % | TEMPERATURE: 98.2 F | WEIGHT: 315 LBS | DIASTOLIC BLOOD PRESSURE: 75 MMHG | SYSTOLIC BLOOD PRESSURE: 133 MMHG | BODY MASS INDEX: 42.66 KG/M2

## 2019-10-12 DIAGNOSIS — R63.5 ABNORMAL WEIGHT GAIN: ICD-10-CM

## 2019-10-12 DIAGNOSIS — Z12.11 ENCOUNTER FOR SCREENING FOR MALIGNANT NEOPLASM OF COLON: ICD-10-CM

## 2019-10-12 DIAGNOSIS — M25.50 PAIN IN UNSPECIFIED JOINT: ICD-10-CM

## 2019-10-12 DIAGNOSIS — N20.0 CALCULUS OF KIDNEY: ICD-10-CM

## 2019-10-12 DIAGNOSIS — K62.5 HEMORRHAGE OF ANUS AND RECTUM: ICD-10-CM

## 2019-10-12 PROCEDURE — 99214 OFFICE O/P EST MOD 30 MIN: CPT | Mod: 25

## 2019-10-12 PROCEDURE — 96127 BRIEF EMOTIONAL/BEHAV ASSMT: CPT

## 2019-10-12 PROCEDURE — 90471 IMMUNIZATION ADMIN: CPT

## 2019-10-12 PROCEDURE — 36415 COLL VENOUS BLD VENIPUNCTURE: CPT

## 2019-10-12 PROCEDURE — 90674 CCIIV4 VAC NO PRSV 0.5 ML IM: CPT

## 2019-10-13 PROBLEM — M25.50 ARTHRALGIA: Status: ACTIVE | Noted: 2018-12-31

## 2019-10-13 PROBLEM — N20.0 NEPHROLITHIASIS: Status: ACTIVE | Noted: 2017-02-24

## 2019-10-13 PROBLEM — Z12.11 COLON CANCER SCREENING: Status: ACTIVE | Noted: 2019-10-12

## 2019-10-13 PROBLEM — K62.5 RECTAL BLEEDING: Status: ACTIVE | Noted: 2017-07-12

## 2019-10-13 PROBLEM — R63.5 WEIGHT GAIN: Status: ACTIVE | Noted: 2017-07-12

## 2019-10-13 LAB
25(OH)D3 SERPL-MCNC: 18.8 NG/ML
ALBUMIN SERPL ELPH-MCNC: 4.5 G/DL
ALP BLD-CCNC: 62 U/L
ALT SERPL-CCNC: 39 U/L
ANION GAP SERPL CALC-SCNC: 15 MMOL/L
APPEARANCE: CLEAR
AST SERPL-CCNC: 31 U/L
BACTERIA: NEGATIVE
BILIRUB SERPL-MCNC: 0.4 MG/DL
BILIRUBIN URINE: NEGATIVE
BLOOD URINE: NEGATIVE
BUN SERPL-MCNC: 14 MG/DL
CALCIUM SERPL-MCNC: 9.4 MG/DL
CALCIUM SERPL-MCNC: 9.4 MG/DL
CHLORIDE SERPL-SCNC: 103 MMOL/L
CO2 SERPL-SCNC: 21 MMOL/L
COLOR: YELLOW
CREAT SERPL-MCNC: 0.86 MG/DL
ESTIMATED AVERAGE GLUCOSE: 123 MG/DL
GLUCOSE QUALITATIVE U: NEGATIVE
GLUCOSE SERPL-MCNC: 106 MG/DL
HBA1C MFR BLD HPLC: 5.9 %
HYALINE CASTS: 1 /LPF
KETONES URINE: NEGATIVE
LEUKOCYTE ESTERASE URINE: NEGATIVE
MICROSCOPIC-UA: NORMAL
NITRITE URINE: NEGATIVE
PARATHYROID HORMONE INTACT: 30 PG/ML
PH URINE: 6.5
POTASSIUM SERPL-SCNC: 4 MMOL/L
PROT SERPL-MCNC: 6.9 G/DL
PROTEIN URINE: NEGATIVE
RED BLOOD CELLS URINE: 1 /HPF
SODIUM SERPL-SCNC: 139 MMOL/L
SPECIFIC GRAVITY URINE: 1.03
SQUAMOUS EPITHELIAL CELLS: 1 /HPF
TSH SERPL-ACNC: 1.32 UIU/ML
UROBILINOGEN URINE: NORMAL
WHITE BLOOD CELLS URINE: 1 /HPF

## 2019-10-13 NOTE — REVIEW OF SYSTEMS
[Recent Change In Weight] : ~T recent weight change [Fever] : no fever [Chills] : no chills [Fatigue] : no fatigue [Chest Pain] : no chest pain [Palpitations] : no palpitations [Lower Ext Edema] : no lower extremity edema [Shortness Of Breath] : no shortness of breath [Wheezing] : no wheezing [Cough] : no cough [Dyspnea on Exertion] : not dyspnea on exertion [Abdominal Pain] : no abdominal pain [Nausea] : no nausea [Diarrhea] : no diarrhea [Vomiting] : no vomiting [Anxiety] : no anxiety [Depression] : no depression [Negative] : Psychiatric [FreeTextEntry2] : gained 11 lbs

## 2019-10-13 NOTE — HISTORY OF PRESENT ILLNESS
[de-identified] : Here for follow up\par \par he states kidney stones was removed s/p ureteroscopy and stent.  he states stent has not bee removed.  he has has f/u with urologist next week\par \par he states he currently feels well and denies any complaints\par \par He would like flu shot.  He denies any egg allergies\par \par

## 2019-10-13 NOTE — COUNSELING
[Potential consequences of obesity discussed] : Potential consequences of obesity discussed [Benefits of weight loss discussed] : Benefits of weight loss discussed [Encouraged to increase physical activity] : Encouraged to increase physical activity [Good understanding] : Patient has a good understanding of disease, goals and obesity follow-up plan

## 2019-10-13 NOTE — PHYSICAL EXAM
[No Acute Distress] : no acute distress [Well-Appearing] : well-appearing [Normal Voice/Communication] : normal voice/communication [Normal Sclera/Conjunctiva] : normal sclera/conjunctiva [PERRL] : pupils equal round and reactive to light [Normal Oropharynx] : the oropharynx was normal [No JVD] : no jugular venous distention [Supple] : supple [No Lymphadenopathy] : no lymphadenopathy [Thyroid Normal, No Nodules] : the thyroid was normal and there were no nodules present [No Respiratory Distress] : no respiratory distress  [Clear to Auscultation] : lungs were clear to auscultation bilaterally [No Accessory Muscle Use] : no accessory muscle use [Normal Rate] : normal rate  [Regular Rhythm] : with a regular rhythm [Normal S1, S2] : normal S1 and S2 [No Murmur] : no murmur heard [No Edema] : there was no peripheral edema [No Extremity Clubbing/Cyanosis] : no extremity clubbing/cyanosis [Soft] : abdomen soft [Non Tender] : non-tender [Non-distended] : non-distended [No Masses] : no abdominal mass palpated [No HSM] : no HSM [Normal Bowel Sounds] : normal bowel sounds [No Rash] : no rash [No Focal Deficits] : no focal deficits [Normal Affect] : the affect was normal [Alert and Oriented x3] : oriented to person, place, and time [Normal Mood] : the mood was normal [Normal Insight/Judgement] : insight and judgment were intact [de-identified] : Obese

## 2019-10-13 NOTE — ASSESSMENT
[FreeTextEntry1] : \par Nephrolithiasis s/p ureteroscopy with stent placement\par -he is followed by Urologist\par -will check UA and PTH\par \par Depression:\par -on lexapro 10mg 1/2 tab Po daily \par -PHQ 2 score 0\par \par Arthralgias/joint stiffness:\par - + ZAHRA  1:80 but all f/u testing negative\par -he has been referred to rheumatology-I again advised he make appt\par -uses mobic prn\par \par Dermatitis: ? psoriasis\par -uses clobestasol cream prn\par -he has been referred him to dermatology-I again advised he make appt\par \par Rectal bleeding:\par -no sx currently\par -hx of external hemorrhoids\par -he has been referred to colorectal surgery-again advised\par \par Lung nodule:\par -CT chest 1/2019 shows 9 mm Right lower lung nodule-previously 6mm \par -recent CT abd/pelvis showed 6 mm RLL lung nodule\par -I advised he repeat CT chest in 6 months 11/2019-will order today\par -I also advised he f/u with Pulmonary \par \par Left gynecomastia:\par -seen on CT\par -he denies discomfort or pain\par -will monitor\par -weight loss advised\par \par HTN:\par -BP near goal on losartan 50mg PO daily\par -will check labs\par \par Obesity/Pre-DM:\par -BMI 43\par -HgA1c 6.0\par -risks of obesity discussed\par -benefits of weight loss discussed\par -low fat/low chol diet and low carbohydrate diet advised\par -small portion sizes encouraged\par -I advised avoidance of sugary drinks\par -aerobic exercise encouraged\par -weight loss advised\par -labs ordered today\par \par PORTILLO:\par -on CPAP-followed by pulmonary\par \par Vitamin D Deficiency:\par -vitamin D 3 1000 units daily\par -will check vitamin D 25-OH\par \par Elevated LFTs:\par -hepatitis B and c serologies were negative\par -has hx of fatty liver\par -weight loss advised\par -will check CMP\par \par HCM:\par \par CPE 12/31/2018\par \par EKG: 3/2019\par \par Flu shot: advised.  R/B discussed.  No egg allergy reported.  VIS given.  Flu shot given today 10/12/2019\par \par Tdap: 12/31/2018\par \par HIV testing: negative 12/2018\par \par Hepatitis C screening: negative 8/2015\par \par PSA: 0.84 12/2018\par \par Depression screening: 10/12/2019 PHQ 2 score 0\par \par Colon cancer screening: discussed with pt as he is 49-I advised he see GI for colonoscopy-he was reluctant as he does not have much time at this time and states wants to continue to follow up with urologist first to make sure kidney stone is taken care of.  I will order FIT test and he was agreeable\par \par F/U 3 months.  labs drawn in office today

## 2019-10-18 ENCOUNTER — RESULT REVIEW (OUTPATIENT)
Age: 49
End: 2019-10-18

## 2019-10-24 ENCOUNTER — APPOINTMENT (OUTPATIENT)
Age: 49
End: 2019-10-24
Payer: COMMERCIAL

## 2019-10-24 VITALS
HEART RATE: 60 BPM | SYSTOLIC BLOOD PRESSURE: 117 MMHG | OXYGEN SATURATION: 96 % | DIASTOLIC BLOOD PRESSURE: 85 MMHG | HEIGHT: 72 IN | RESPIRATION RATE: 16 BRPM | BODY MASS INDEX: 42.66 KG/M2 | WEIGHT: 315 LBS

## 2019-10-24 PROCEDURE — 99214 OFFICE O/P EST MOD 30 MIN: CPT

## 2019-10-31 NOTE — ASSESSMENT
[FreeTextEntry1] : Kidney stone:\par Recommended Kidney stone prevention diet:\par Good oral hydration so that urine is clear to light yellow, usually 1.5 to 2 Liters of fluids, mainly water\par Increasing Citrate in diet by consuming citrus fruits and juices- krystal, limes, oranges, grapefruits and berries \par Less red meat\par Less salt\par Limit foods with oxalate like- dark green vegetables, rhubarb, chocolate, wheat bran, nuts, cranberries, and beans\par \par Renal Ultrasound pending.\par Will get Kidney stone metabolic work up- Uric acid and 24 Hr urine kidney stone risk profile.\par Will inform results. \par \par Return to office in 6 months or sooner if any issues.

## 2019-10-31 NOTE — HISTORY OF PRESENT ILLNESS
[FreeTextEntry1] : 50 yo male presents for follow up. \par Taking Flomax- urinating well. \par Denies dysuria, hematuria, lower abdominal or flank pain, fever, chills or rigors. \par Did not have Renal Ultrasound or kidney stone metabolic work up.\par \par Status post left ureteroscopy, laser lithotripsy, stone extraction and ureteral stent exchange at [facility] on 7/19/19. \par Performed cystoscopy and left ureteral stent removal 7/31/19.\par s/p Left ESWL on 3/15/19 at [facility]. \par \par \par Initially seen for Kidney stone. \par Had seen another Urologist in the past and underwent Cystoscopy for Microhematuria and Prostate biopsy for Prostate nodule.

## 2019-11-27 ENCOUNTER — RX RENEWAL (OUTPATIENT)
Age: 49
End: 2019-11-27

## 2019-12-10 ENCOUNTER — RX RENEWAL (OUTPATIENT)
Age: 49
End: 2019-12-10

## 2020-01-04 ENCOUNTER — APPOINTMENT (OUTPATIENT)
Dept: INTERNAL MEDICINE | Facility: CLINIC | Age: 50
End: 2020-01-04

## 2020-01-06 ENCOUNTER — APPOINTMENT (OUTPATIENT)
Dept: ORTHOPEDIC SURGERY | Facility: CLINIC | Age: 50
End: 2020-01-06
Payer: COMMERCIAL

## 2020-01-06 VITALS
SYSTOLIC BLOOD PRESSURE: 148 MMHG | HEIGHT: 72 IN | DIASTOLIC BLOOD PRESSURE: 92 MMHG | WEIGHT: 315 LBS | BODY MASS INDEX: 42.66 KG/M2 | HEART RATE: 73 BPM

## 2020-01-06 PROCEDURE — 20610 DRAIN/INJ JOINT/BURSA W/O US: CPT | Mod: RT

## 2020-01-06 PROCEDURE — 99204 OFFICE O/P NEW MOD 45 MIN: CPT | Mod: 25

## 2020-01-06 PROCEDURE — 73562 X-RAY EXAM OF KNEE 3: CPT | Mod: RT

## 2020-01-06 NOTE — PHYSICAL EXAM
[LE] : Sensory: Intact in bilateral lower extremities [Normal] : Alert and in no acute distress [ALL] : dorsalis pedis, posterior tibial, femoral, popliteal, and radial 2+ and symmetric bilaterally [Obese] : obese [Antalgic] : not antalgic [Poor Appearance] : well-appearing [Acute Distress] : not in acute distress [de-identified] : GENERAL APPEARANCE: Well nourished and hydrated, pleasant, alert, and oriented x 3. Appears their stated age. \par HEENT: Normocephalic, extraocular eye motion intact. Nasal septum midline. Oral cavity clear. External auditory canal clear. \par RESPIRATORY: Breath sounds clear and audible in all lobes. No wheezing, No accessory muscle use.\par CARDIOVASCULAR: No apparent abnormalities. No lower leg edema. No varicosities. Pedal pulses are palpable.\par NEUROLOGIC: Sensation is normal, no muscle weakness in the upper or lower extremities.\par DERMATOLOGIC: No apparent skin lesions, moist, warm, no rash.\par SPINE: Cervical spine appears normal and moves freely; thoracic spine appears normal and moves freely; lumbosacral spine appears normal and moves freely, normal, nontender.\par MUSCULOSKELETAL: Hands, wrists, and elbows are normal and move freely, shoulders are normal and move freely.  [de-identified] : Right knee exam shows varus deformity, medial joint line tenderness, crepitus with ROM, his range of motion is preserved.  [de-identified] : 3V Xray of the right knee done in office today and reviewed by Dr. Kevin Shoemaker demonstrates severe tricompartmental posttraumatic osteoarthritis

## 2020-01-06 NOTE — ADDENDUM
[FreeTextEntry1] : I, Dontrell Rojas, acted solely as a scribe for Dr. Kevin Shoemaker on this date 01/06/2020.

## 2020-01-06 NOTE — DISCUSSION/SUMMARY
[de-identified] : 49 year old  male with severe tricompartmental posttraumatic osteoarthritis of the right knee. The patient is a candidate for right TKA. He had previous ACL reconstruction from a work-related injury. This progression of OA is secondary to this injury. We have encouraged him to discuss the case with his  and adjustor. It should likely be re-opened. We will likely pursue right total knee arthroplasty when the patient is ready. He elected to receive a cortisone injection in his right knee which he tolerated well. \par \par A knee replacement means resurfacing of all 3 surfaces of the patella, the femur, and tibia with metal and plastic parts. The prosthetic parts are usually cemented into position and well outpatient range of motion from full extension to about 120° of flexion. The postoperative motion, however, is determined by multiple factors, the most important of which is preoperative motion. In general, the better motion preoperatively, the better the motion postoperatively. The operation, pending medical clearance, gently requires hospitalization of 1 to 2 days for one knee, 2 to 4 days for bilateral knee replacements. In general, we prefer to perform the procedure under spinal anesthesia with femoral nerve block and occasional single shot sciatic nerve block. We may ask a patient to give 2 units of blood for bilateral total knee arthroplasties, for one knee we institute a normogram which may include administration of preoperative Procrit. The operative procedure takes probably 1-2 hours. The operation requires a straight incision anywhere from 5-7 inches down from the knee. Post-operatively the patient will be walking the day of surgery. The first couple days are very painful and the pain medication will alleviate, but not eliminate the pain. The patient must really push hard to get range of motion. Our goal for having a person go home as that the range of motion is approximately 0-90° of flexion and that they can walk with a walker or cane. A walking aid is to be dispensed once the patient is secure enough. In general there, there is no cast or brace required with routine knee replacement. In the long term, we do not encourage our patients to run for the sake of running, although pending their preoperative status, we often allow patient to play doubles tennis or comparative activities. We also allowed them to do gentle intermediate downhill skiing if they are truly an expert skier. Biking is encouraged as well swimming. The followup periods are usually 3 weeks, 6 weeks, 3 months, and yearly intervals. Potential complications with total knee replacement included anesthetic complications and death, infection around 1%, nerve damage, by which means peroneal nerve palsy, footdrop or flapping foot with ambulation. This is particularly more apt to occur in the patient with a valgus or knock-knee deformity. The incidence can be quite high in this particular patient population. There will be areas of skin numbness, but this is not an untoward effect nor do we consider it a complication. Other potential complications include dislocation of the patella component, usually less than 2%; loosening of the tibial or femoral component is much more infrequent. Most often this occurs with infection or long-term use. Patient of extreme risk including markedly overweight patient's may be more prone to prosthetic wear. Major blood vessel damage is also extremely rare. Directly because of the anatomic proximity of the popliteal artery this could be lacerated with subsequent repair required. Be it unlikely, disruption of the popliteal artery could theoretically result in amputation. Similarly, infection could theoretically result in amputation if one were to grow out of an organism that cannot be controlled with antibiotics. General medical complications include phlebitis, for which we would prophylactically anticoagulate patients, but could still occur, and fatal pulmonary embolus which has been reported. Cardiovascular problems, such as heart attack or ischemia are always a concern with such hemodynamic changes in the blood vascular system. Other General complications are rare, but anything medicine could theoretically happen. I think the patient understands the risk benefit ratio of total knee replacement and will think about whether there like to pursue with an operation or nonoperative treatment program. I reviewed the plan of care as well as a model of a total knee implant equivalent to the one that will be used for their total knee joint replacement. The patient agreed to the plan of care as well as the use of implants for their knee total joint replacement.

## 2020-01-06 NOTE — HISTORY OF PRESENT ILLNESS
[Constant] : ~He/She~ states the symptoms seem to be constant [6] : a current pain level of 6/10 [Bending] : worsened by bending [Walking] : worsened by walking [Ice] : relieved by ice [Rest] : relieved by rest [Worsening] : worsening [de-identified] : 49 year old male here for initial evaluation of right knee pain. The patient is s/p right knee cadaveric ACL reconstruction from about 3 years ago with Dr. Dominguez. \par He rates pain 6/10 in severity and describes pain as achy and sharp. Pain is worse with ambulating, bending, navigating stairs, and climbing ladders. The pain limits him from exercising. Pain is better with rest and ice. He reports instability in the right knee and he uses a brace. He has attempted cortisone injections with some relief in the past. Patient is employed as an .

## 2020-01-28 ENCOUNTER — RX RENEWAL (OUTPATIENT)
Age: 50
End: 2020-01-28

## 2020-03-04 ENCOUNTER — TRANSCRIPTION ENCOUNTER (OUTPATIENT)
Age: 50
End: 2020-03-04

## 2020-03-04 ENCOUNTER — NON-APPOINTMENT (OUTPATIENT)
Age: 50
End: 2020-03-04

## 2020-04-17 ENCOUNTER — RX RENEWAL (OUTPATIENT)
Age: 50
End: 2020-04-17

## 2020-04-20 ENCOUNTER — APPOINTMENT (OUTPATIENT)
Dept: ORTHOPEDIC SURGERY | Facility: CLINIC | Age: 50
End: 2020-04-20

## 2020-04-27 ENCOUNTER — APPOINTMENT (OUTPATIENT)
Dept: INTERNAL MEDICINE | Facility: CLINIC | Age: 50
End: 2020-04-27
Payer: COMMERCIAL

## 2020-04-27 PROCEDURE — 99213 OFFICE O/P EST LOW 20 MIN: CPT | Mod: 95

## 2020-04-27 NOTE — HISTORY OF PRESENT ILLNESS
[Home] : at home, [unfilled] , at the time of the visit. [Medical Office: ([facility])___] : at the medical office located in  [Patient] : the patient [de-identified] : \par \par As this is telemedicine visit no physical exam could be performed.  Diagnosis and treatment based upon symptoms provided\par \par He states he requested telemedicine visit today because he needs refill on lexapro and betamethasone cream\par \par He states he feels well and denies any complaints\par \par He states he recently underwent a life screening and he was told he was pre-diabetic\par

## 2020-04-27 NOTE — ASSESSMENT
[FreeTextEntry1] : \par \par Depression:\par -on lexapro 10mg 1/2 tab Po daily \par -will refill\par \par Arthralgias/joint stiffness:\par - + ZAHRA  1:80 but all f/u testing negative\par -he has been referred to rheumatology-he has not yet made appt but will make appt\par -he was seen by Dr martinez for right knee pain and told he had OA-he was told he may need knee replacement but states lately has not been bothering him much\par -uses mobic prn\par \par Psoriasis\par -uses clobestasol cream prn-will refill\par -referred him to dermatology previously\par \par Rectal bleeding:\par -no further sx\par -hx of external hemorrhoids\par -he has been referred to colorectal surgery-CLAUDIA basurto advised he make appt\par \par Lung nodule:\par 7 mm right lung nodule seen on 11/2019 CT which is stable-repeat CT chest advised 11/2020\par \par HTN:\par -on losartan 50mg PO daily-will refills\par -will check labs\par \par Obesity/Pre-DM:\par -BMI 43\par -HgA1c 6.0\par -risks of obesity discussed\par -benefits of weight loss discussed\par -low fat/low chol diet and low carbohydrate diet advised\par -small portion sizes encouraged\par -I advised avoidance of sugary drinks\par -aerobic exercise encouraged\par -weight loss advised\par -labs ordered today\par \par PORTILLO:\par -on CPAP-followed by pulmonary\par \par Vitamin D Deficiency:\par -will check vitamin D 25-OH\par \par Elevated LFTs:\par -hepatitis B and c serologies were negative\par -has hx of fatty liver\par -weight loss advised\par -will check CMP\par \par HCM:\par \par CPE 12/31/2018\par \par EKG: 3/2019\par \par Flu shot: 10/12/2019\par \par Tdap: 12/31/2018\par \par HIV testing: negative 12/2018\par \par Hepatitis C screening: negative 8/2015\par \par PSA: 0.84 12/2018-will check PSA\par \par Depression screening: 10/12/2019 PHQ 2 score 0\par \par Colon cancer screening: colonoscopy and FIT test has been advised previously\par \par F/U 3 months.  Will order fasting labs which he will have done at lab in next 4 weeks

## 2020-04-27 NOTE — PHYSICAL EXAM
[No Acute Distress] : no acute distress [Well-Appearing] : well-appearing [Normal Voice/Communication] : normal voice/communication [Normal Affect] : the affect was normal [Alert and Oriented x3] : oriented to person, place, and time [Normal Insight/Judgement] : insight and judgment were intact [Normal Mood] : the mood was normal

## 2020-06-25 ENCOUNTER — APPOINTMENT (OUTPATIENT)
Dept: UROLOGY | Facility: CLINIC | Age: 50
End: 2020-06-25
Payer: COMMERCIAL

## 2020-06-25 VITALS — TEMPERATURE: 98 F

## 2020-06-25 PROCEDURE — 51741 ELECTRO-UROFLOWMETRY FIRST: CPT

## 2020-06-25 PROCEDURE — 51798 US URINE CAPACITY MEASURE: CPT | Mod: 59

## 2020-06-25 PROCEDURE — 99214 OFFICE O/P EST MOD 30 MIN: CPT | Mod: 25

## 2020-06-25 NOTE — LETTER BODY
[Dear  ___] : Dear  [unfilled], [Courtesy Letter:] : I had the pleasure of seeing your patient, [unfilled], in my office today. [Please see my note below.] : Please see my note below. [Sincerely,] : Sincerely, [FreeTextEntry3] : Kavon Medrano MD\par  of Urology\par [facility]\par \par Offices:\par [facility] @\par [address], [place] [address]\par and\par [address], [place] [address], [address]\par and\par [address] [address]\par \par TEL: [phone]\par FAX: [phone]

## 2020-06-25 NOTE — ASSESSMENT
[FreeTextEntry1] : Benign Prostatic Hyperplasia:\par Continue Flomax. \par \par Disorder of calcium metabolism:\par Recommended Kidney stone prevention diet:\par Good oral hydration so that urine is clear to light yellow, usually 1.5 to 2 Liters of fluids, mainly water\par Increasing Citrate in diet by consuming citrus fruits and juices- krystal, limes, oranges, grapefruits and berries \par Less red meat\par Less salt\par Limit foods with oxalate like- dark green vegetables, rhubarb, chocolate, wheat bran, nuts, cranberries, and beans\par \par Repeat 24 hr urine test before follow up appointment. \par \par History of kidney stone:\par Renal Ultrasound before follow up appointment.\par \par Return to office in 6 months or sooner if any issues.

## 2020-06-25 NOTE — PHYSICAL EXAM
[Urethral Meatus] : meatus normal [Epididymis] : the epididymides were normal [Penis Abnormality] : normal circumcised penis [Scrotum] : the scrotum was normal [Testes Tenderness] : no tenderness of the testes [No Prostate Nodules] : no prostate nodules [Testes Mass (___cm)] : there were no testicular masses [Prostate Tenderness] : the prostate was not tender [Prostate Size ___ gm] : prostate size [unfilled] gm [General Appearance - Well Developed] : well developed [Normal Appearance] : normal appearance [Abdomen Soft] : soft [General Appearance - In No Acute Distress] : no acute distress [Abdomen Tenderness] : non-tender [Abdomen Mass (___ Cm)] : no abdominal mass palpated [Costovertebral Angle Tenderness] : no ~M costovertebral angle tenderness [FreeTextEntry1] : normal peripheral circulation  [Skin Color & Pigmentation] : normal skin color and pigmentation [Oriented To Time, Place, And Person] : oriented to person, place, and time [] : no respiratory distress [Normal Station and Gait] : the gait and station were normal for the patient's age [No Focal Deficits] : no focal deficits

## 2020-06-25 NOTE — HISTORY OF PRESENT ILLNESS
[FreeTextEntry1] : 50 yo male presents for follow up. \par Taking Flomax- urinates with reasonable stream, every few hours or so during the day, nocturia 1-2 x. Endorses off and on post void dribbling. \par Denies dysuria, hematuria, lower abdominal or flank pain, fever, chills or rigors.\par \par Status post cystoscopy and left ureteral stent removal 7/31/19.\par Status post left ureteroscopy, laser lithotripsy, stone extraction and ureteral stent exchange at [facility] on 7/19/19. \par Status post Left ESWL on 3/15/19 at [facility]. \par \par Initially seen for Kidney stone. \par Had seen another Urologist in the past and underwent Cystoscopy for Microhematuria and Prostate biopsy for Prostate nodule.

## 2020-07-29 ENCOUNTER — RX RENEWAL (OUTPATIENT)
Age: 50
End: 2020-07-29

## 2020-10-06 ENCOUNTER — RX RENEWAL (OUTPATIENT)
Age: 50
End: 2020-10-06

## 2020-10-14 ENCOUNTER — RX RENEWAL (OUTPATIENT)
Age: 50
End: 2020-10-14

## 2020-10-19 ENCOUNTER — APPOINTMENT (OUTPATIENT)
Dept: INTERNAL MEDICINE | Facility: CLINIC | Age: 50
End: 2020-10-19
Payer: COMMERCIAL

## 2020-10-19 PROCEDURE — 99213 OFFICE O/P EST LOW 20 MIN: CPT | Mod: 25,95

## 2020-10-19 NOTE — PHYSICAL EXAM
[Well-Appearing] : well-appearing [No Acute Distress] : no acute distress [Normal Voice/Communication] : normal voice/communication

## 2020-10-19 NOTE — ASSESSMENT
[FreeTextEntry1] : \par \par Depression:\par -on lexapro 10mg 1/2 tab Po daily \par \par Arthralgias/joint stiffness:\par - + ZAHRA  1:80 but all f/u testing negative\par -he has been referred to rheumatology-he has not yet made appt but will make appt\par -he was seen by Dr martinez for right knee pain and told he had OA\par -uses mobic prn\par \par Psoriasis\par -uses clobestasol cream prn\par -referred him to dermatology previously\par \par Rectal bleeding:\par -no further sx\par -hx of external hemorrhoids\par -he has been referred to colorectal surgery-I again advised he make appt\par \par Lung nodule:\par 7 mm right lung nodule seen on 11/2019 CT which is stable-repeat CT chest advised 11/2020-will order today\par \par HTN:\par -on losartan 50mg PO daily\par -will request lab results from his cardiologist\par \par Obesity/Pre-DM:\par -BMI 43\par -HgA1c 6.0\par -will request records from cardiologist\par \par PORTILLO:\par -on CPAP-followed by pulmonary\par \par Vitamin D Deficiency:\par -will request lab results from cardiologist\par \par Elevated LFTs:\par -hepatitis B and c serologies were negative\par -has hx of fatty liver\par -weight loss advised\par -will check CMP-will request lab results from his cardiologist\par \par HCM:\par \par CPE 12/31/2018\par \par EKG: 3/2019\par \par Flu shot: 10/2020\par \par Tdap: 12/31/2018\par \par HIV testing: negative 12/2018\par \par Hepatitis C screening: negative 8/2015\par \par PSA: 0.84 12/2018-PSA ordered previously-will call for lab results which were done with his cardiologist\par \par Depression screening: 10/12/2019 PHQ 2 score 0\par \par Colon cancer screening: colonoscopy and FIT test has been advised previously\par \par F/U 2-3 months for in person visit for BP check.  Will call cardiologist Dr Priest for consult note, recent testing, and lab results.  Will refill meds once labs reviewed

## 2020-11-13 ENCOUNTER — RX CHANGE (OUTPATIENT)
Age: 50
End: 2020-11-13

## 2020-11-14 ENCOUNTER — RX RENEWAL (OUTPATIENT)
Age: 50
End: 2020-11-14

## 2020-12-15 ENCOUNTER — APPOINTMENT (OUTPATIENT)
Dept: UROLOGY | Facility: CLINIC | Age: 50
End: 2020-12-15

## 2021-03-06 ENCOUNTER — RX RENEWAL (OUTPATIENT)
Age: 51
End: 2021-03-06

## 2021-04-06 ENCOUNTER — APPOINTMENT (OUTPATIENT)
Dept: UROLOGY | Facility: CLINIC | Age: 51
End: 2021-04-06
Payer: COMMERCIAL

## 2021-04-06 VITALS — DIASTOLIC BLOOD PRESSURE: 68 MMHG | TEMPERATURE: 97.6 F | HEART RATE: 111 BPM | SYSTOLIC BLOOD PRESSURE: 105 MMHG

## 2021-04-06 DIAGNOSIS — N50.819 TESTICULAR PAIN, UNSPECIFIED: ICD-10-CM

## 2021-04-06 LAB
BILIRUB UR QL STRIP: NEGATIVE
CLARITY UR: CLEAR
COLLECTION METHOD: NORMAL
GLUCOSE UR-MCNC: NEGATIVE
HCG UR QL: 1 EU/DL
HGB UR QL STRIP.AUTO: NEGATIVE
KETONES UR-MCNC: NORMAL
LEUKOCYTE ESTERASE UR QL STRIP: NEGATIVE
NITRITE UR QL STRIP: NEGATIVE
PH UR STRIP: 5.5
PROT UR STRIP-MCNC: NEGATIVE
SP GR UR STRIP: 1.02

## 2021-04-06 PROCEDURE — 99214 OFFICE O/P EST MOD 30 MIN: CPT

## 2021-04-06 PROCEDURE — 81003 URINALYSIS AUTO W/O SCOPE: CPT | Mod: QW

## 2021-04-06 PROCEDURE — 99072 ADDL SUPL MATRL&STAF TM PHE: CPT

## 2021-04-13 NOTE — PHYSICAL EXAM
[Urethral Meatus] : meatus normal [Penis Abnormality] : normal circumcised penis [Scrotum] : the scrotum was normal [Epididymis] : the epididymides were normal [Testes Tenderness] : no tenderness of the testes [Testes Mass (___cm)] : there were no testicular masses [Normal Appearance] : normal appearance [General Appearance - In No Acute Distress] : no acute distress [Abdomen Soft] : soft [Abdomen Tenderness] : non-tender [Costovertebral Angle Tenderness] : no ~M costovertebral angle tenderness [Skin Color & Pigmentation] : normal skin color and pigmentation [] : no respiratory distress [Oriented To Time, Place, And Person] : oriented to person, place, and time [Normal Station and Gait] : the gait and station were normal for the patient's age [No Focal Deficits] : no focal deficits [FreeTextEntry1] : normal peripheral circulation

## 2021-04-13 NOTE — ASSESSMENT
[FreeTextEntry1] : Overactive Bladder:\par Discussed anti cholinergic or B3 agonist. Wants to hold off. \par \par Benign Prostatic Hyperplasia:\par Continue Flomax. \par \par Disorder of calcium metabolism:\par 24 hr urine test pending\par \par Testis pain: \par Will get Scrotal Ultrasound. \par \par PSA Screening:\par Discussed PSA screening and latest recommendations/guidelines- USPTF and AUA. \par Will get PSA. \par \par Will inform results. \par \par Return to office in 6 months or sooner if any issues- will do Uroflo/PVR

## 2021-04-13 NOTE — HISTORY OF PRESENT ILLNESS
[FreeTextEntry1] : 51 yo male presents for follow up. \par Taking Flomax- urinates with reasonable stream, every 1-2 hours or so during the day, nocturia 1-2 x.\par Has post void dribbling. Denies dysuria, hematuria, fever, chills or rigors.\par Has off and on left back pain and bilateral testis pain. \par Maternal uncle has Prostate cancer \par \par Status post cystoscopy and left ureteral stent removal 7/31/19.\par Status post left ureteroscopy, laser lithotripsy, stone extraction and ureteral stent exchange at [facility] on 7/19/19. \par Status post Left ESWL on 3/15/19 at [facility]. \par \par Initially seen for Kidney stone. \par Had seen another Urologist in the past and underwent Cystoscopy for Microhematuria and Prostate biopsy for Prostate nodule.

## 2021-04-20 ENCOUNTER — APPOINTMENT (OUTPATIENT)
Dept: INTERNAL MEDICINE | Facility: CLINIC | Age: 51
End: 2021-04-20
Payer: COMMERCIAL

## 2021-04-20 ENCOUNTER — LABORATORY RESULT (OUTPATIENT)
Age: 51
End: 2021-04-20

## 2021-04-20 VITALS
TEMPERATURE: 98.6 F | DIASTOLIC BLOOD PRESSURE: 76 MMHG | OXYGEN SATURATION: 95 % | SYSTOLIC BLOOD PRESSURE: 128 MMHG | WEIGHT: 315 LBS | HEIGHT: 72 IN | BODY MASS INDEX: 42.66 KG/M2 | HEART RATE: 84 BPM | RESPIRATION RATE: 16 BRPM

## 2021-04-20 DIAGNOSIS — M25.561 PAIN IN RIGHT KNEE: ICD-10-CM

## 2021-04-20 DIAGNOSIS — E55.9 VITAMIN D DEFICIENCY, UNSPECIFIED: ICD-10-CM

## 2021-04-20 DIAGNOSIS — M25.562 PAIN IN RIGHT KNEE: ICD-10-CM

## 2021-04-20 PROCEDURE — 36415 COLL VENOUS BLD VENIPUNCTURE: CPT

## 2021-04-20 PROCEDURE — 99072 ADDL SUPL MATRL&STAF TM PHE: CPT

## 2021-04-20 PROCEDURE — 96127 BRIEF EMOTIONAL/BEHAV ASSMT: CPT

## 2021-04-20 PROCEDURE — 99215 OFFICE O/P EST HI 40 MIN: CPT | Mod: 25

## 2021-04-20 RX ORDER — ERGOCALCIFEROL 1.25 MG/1
1.25 MG CAPSULE, LIQUID FILLED ORAL
Qty: 12 | Refills: 0 | Status: DISCONTINUED | COMMUNITY
Start: 2019-10-17 | End: 2021-04-20

## 2021-04-20 RX ORDER — ESCITALOPRAM OXALATE 10 MG/1
10 TABLET ORAL
Qty: 90 | Refills: 0 | Status: DISCONTINUED | COMMUNITY
Start: 2018-12-31 | End: 2021-04-20

## 2021-04-20 RX ORDER — MELOXICAM 15 MG/1
15 TABLET ORAL
Qty: 30 | Refills: 0 | Status: DISCONTINUED | COMMUNITY
Start: 2018-12-31 | End: 2021-04-20

## 2021-04-20 RX ORDER — TRAMADOL HYDROCHLORIDE 50 MG/1
50 TABLET, COATED ORAL 3 TIMES DAILY
Qty: 30 | Refills: 0 | Status: DISCONTINUED | COMMUNITY
Start: 2019-03-15 | End: 2021-04-20

## 2021-04-22 LAB
25(OH)D3 SERPL-MCNC: 41.9 NG/ML
ALBUMIN SERPL ELPH-MCNC: 4.5 G/DL
ALP BLD-CCNC: 66 U/L
ALT SERPL-CCNC: 38 U/L
ANACR T: NEGATIVE
ANION GAP SERPL CALC-SCNC: 11 MMOL/L
APPEARANCE: CLEAR
AST SERPL-CCNC: 35 U/L
B BURGDOR IGG+IGM SER QL IB: NORMAL
BACTERIA: NEGATIVE
BASOPHILS # BLD AUTO: 0.02 K/UL
BASOPHILS NFR BLD AUTO: 0.3 %
BILIRUB SERPL-MCNC: 0.4 MG/DL
BILIRUBIN URINE: NEGATIVE
BLOOD URINE: NEGATIVE
BUN SERPL-MCNC: 13 MG/DL
CALCIUM SERPL-MCNC: 9.9 MG/DL
CCP AB SER IA-ACNC: <8 UNITS
CHLORIDE SERPL-SCNC: 103 MMOL/L
CK SERPL-CCNC: 137 U/L
CO2 SERPL-SCNC: 26 MMOL/L
COLOR: NORMAL
CREAT SERPL-MCNC: 0.79 MG/DL
CRP SERPL-MCNC: 5 MG/L
EOSINOPHIL # BLD AUTO: 0.43 K/UL
EOSINOPHIL NFR BLD AUTO: 6.1 %
ERYTHROCYTE [SEDIMENTATION RATE] IN BLOOD BY WESTERGREN METHOD: 19 MM/HR
ESTIMATED AVERAGE GLUCOSE: 126 MG/DL
GLUCOSE QUALITATIVE U: NEGATIVE
GLUCOSE SERPL-MCNC: 124 MG/DL
HBA1C MFR BLD HPLC: 6 %
HCT VFR BLD CALC: 43.2 %
HGB BLD-MCNC: 14.3 G/DL
HIV1+2 AB SPEC QL IA.RAPID: NONREACTIVE
HYALINE CASTS: 1 /LPF
IMM GRANULOCYTES NFR BLD AUTO: 0.4 %
KETONES URINE: NEGATIVE
LEUKOCYTE ESTERASE URINE: NEGATIVE
LYMPHOCYTES # BLD AUTO: 1.02 K/UL
LYMPHOCYTES NFR BLD AUTO: 14.6 %
MAN DIFF?: NORMAL
MCHC RBC-ENTMCNC: 29.8 PG
MCHC RBC-ENTMCNC: 33.1 GM/DL
MCV RBC AUTO: 90 FL
MICROSCOPIC-UA: NORMAL
MONOCYTES # BLD AUTO: 0.43 K/UL
MONOCYTES NFR BLD AUTO: 6.1 %
NEUTROPHILS # BLD AUTO: 5.07 K/UL
NEUTROPHILS NFR BLD AUTO: 72.5 %
NITRITE URINE: NEGATIVE
PH URINE: 7
PLATELET # BLD AUTO: 319 K/UL
POTASSIUM SERPL-SCNC: 3.8 MMOL/L
PROT SERPL-MCNC: 7.2 G/DL
PROTEIN URINE: NEGATIVE
PSA SERPL-MCNC: 0.68 NG/ML
RBC # BLD: 4.8 M/UL
RBC # FLD: 13.3 %
RED BLOOD CELLS URINE: 0 /HPF
RF+CCP IGG SER-IMP: NEGATIVE
SODIUM SERPL-SCNC: 140 MMOL/L
SPECIFIC GRAVITY URINE: 1.01
SQUAMOUS EPITHELIAL CELLS: 0 /HPF
T4 FREE SERPL-MCNC: 1 NG/DL
TSH SERPL-ACNC: 1.38 UIU/ML
URATE SERPL-MCNC: 5.7 MG/DL
UROBILINOGEN URINE: NORMAL
WBC # FLD AUTO: 7 K/UL
WHITE BLOOD CELLS URINE: 0 /HPF

## 2021-04-25 ENCOUNTER — TRANSCRIPTION ENCOUNTER (OUTPATIENT)
Age: 51
End: 2021-04-25

## 2021-04-25 NOTE — ASSESSMENT
[FreeTextEntry1] : \par \par Depression:\par -PHQ 2 score 0\par -Appears he still feels depressed regarding his weight and joint pain/chronic pain\par -He took Lexapro in the past but stopped it because he felt that it did not help\par -Treatment options discussed\par -Given chronic joint pain we discussed the use of Cymbalta\par -will start Cymbalta 30 mg daily  (R/B/A/side effects discussed)\par -I have advised follow-up in 3 to 4 weeks\par \par Arthralgias/joint stiffness/bilateral knee pain:\par - + ZAHRA  1:80 but all f/u testing negative\par -he has been referred to rheumatology previously\par -he was seen by Dr martinez for right knee pain and told he had OA\par -uses mobic prn\par -Check x-ray of bilateral knees\par -Repeat labs today\par -I have again advised that he should see rheumatology\par -As above given chronic joint pain and depression we will start Cymbalta 30 mg daily\par \par Psoriasis\par -uses clobestasol cream prn\par -referred him to dermatology previously\par \par Rectal bleeding:\par -no further sx\par -hx of external hemorrhoids\par -He was referred to colorectal surgery previously but has not gone\par -Check FIT test\par -will refer to GI\par \par Lung nodule:\par 7 mm right lung nodule seen on 2019 CT which is stable-repeat CT chest advised 2020-I have again advised that he is due for CT chest and will order today\par \par HTN:\par -BP at goal today on losartan 50mg PO daily\par -Check labs\par \par Obesity/Pre-DM:\par -BMI 42.7\par -HgA1c 6.0\par -risks of obesity discussed\par -benefits of weight loss discussed\par -low fat/low chol diet and low carbohydrate diet advised\par -small portion sizes encouraged\par -I advised avoidance of sugary drinks\par -aerobic exercise encouraged\par -weight loss advised\par -A long conversation with regards to diet-I did recommend that he does need to stop drinking soda if he wants to lose weight.  He also needs to cut back on carbohydrates\par -Check labs today\par \par PORTILLO:\par -on CPAP-followed by pulmonary\par \par Vitamin D Deficiency:\par -Check labs\par \par Elevated LFTs:\par -hepatitis B and c serologies were negative\par -has hx of fatty liver\par -weight loss advised\par -Check labs\par \par BPH:\par -Sees urologist\par -On Flomax\par -Check PSA\par \par He was recently seen by urologist and reported testicular pain\par -Urologist has ordered a scrotal ultrasound which she will schedule soon\par \par HCM:\par \par CPE 2018\par \par EKG: 3/2019\par \par Flu shot: 10/2020\par \par Tdap: 2018\par \par Covid vaccine ([employer]) 3/1/2021 and 3/22/2021\par \par HIV testing: negative 2018\par \par Hepatitis C screening: negative 2015\par \par PSA: Check PSA today\par \par Depression screenin2021 PHQ 2 score 0\par \par Colon cancer screening: colonoscopy and FIT test has been advised today-referrals given\par \par Lipid panel: Normal 2021\par \par F/U 3 to 4 weeks for depression follow-up given that he was started on Cymbalta today.  Labs drawn in office today

## 2021-04-25 NOTE — HISTORY OF PRESENT ILLNESS
[de-identified] : Here today for with his wife for follow-up appointment\par \par He reports he has pain in his joints.  He states he has pain in his knees in particular.  He states he has pain all the time.  He has not seen rheumatology as previously advised.  He denies any recent trauma or falls.\par \par He self discontinued his Lexapro for depression.  He states he did not feel that it helped.\par \par He states he is frustrated that he has not been able to lose weight.  He states he has used phentermine in the past.  He states he feels that he is addicted to soda.  He states drinking soda makes him feel better.  [place] and contributes to not being able to follow-up with doctors routinely

## 2021-04-25 NOTE — PHYSICAL EXAM
[No Acute Distress] : no acute distress [Normal Voice/Communication] : normal voice/communication [Normal Sclera/Conjunctiva] : normal sclera/conjunctiva [PERRL] : pupils equal round and reactive to light [Normal Oropharynx] : the oropharynx was normal [No JVD] : no jugular venous distention [No Lymphadenopathy] : no lymphadenopathy [Supple] : supple [Thyroid Normal, No Nodules] : the thyroid was normal and there were no nodules present [No Respiratory Distress] : no respiratory distress  [Clear to Auscultation] : lungs were clear to auscultation bilaterally [No Accessory Muscle Use] : no accessory muscle use [Normal Rate] : normal rate  [Regular Rhythm] : with a regular rhythm [Normal S1, S2] : normal S1 and S2 [No Murmur] : no murmur heard [No Carotid Bruits] : no carotid bruits [No Edema] : there was no peripheral edema [No Extremity Clubbing/Cyanosis] : no extremity clubbing/cyanosis [Soft] : abdomen soft [Non Tender] : non-tender [Non-distended] : non-distended [No Masses] : no abdominal mass palpated [No HSM] : no HSM [Normal Bowel Sounds] : normal bowel sounds [No Spinal Tenderness] : no spinal tenderness [No Joint Swelling] : no joint swelling [No Rash] : no rash [No Skin Lesions] : no skin lesions [No Focal Deficits] : no focal deficits [Normal Affect] : the affect was normal [Alert and Oriented x3] : oriented to person, place, and time [Normal Mood] : the mood was normal [Normal Insight/Judgement] : insight and judgment were intact [de-identified] : Bilateral knees with full range of motion and no joint swelling [de-identified] : Obese

## 2021-04-25 NOTE — END OF VISIT
----- Message from Dee Don MD sent at 8/13/2020 12:33 PM EDT -----  Please let patient know her culture is positive for chlamydia infection and it is very important that her and her partner get treated.  I sent rx for her.  She needs to make sure they both are abstinent for at least one week after completing treatment.  
Tried to leave vm but mailbox is full. Sent SMS  
[Time Spent: ___ minutes] : I have spent [unfilled] minutes of time on the encounter.

## 2021-04-25 NOTE — REVIEW OF SYSTEMS
[Fever] : no fever [Chills] : no chills [Fatigue] : no fatigue [Palpitations] : no palpitations [Chest Pain] : no chest pain [Lower Ext Edema] : no lower extremity edema [Shortness Of Breath] : no shortness of breath [Wheezing] : no wheezing [Cough] : no cough [Dyspnea on Exertion] : not dyspnea on exertion [Abdominal Pain] : no abdominal pain [Nausea] : no nausea [Diarrhea] : no diarrhea [Vomiting] : no vomiting [Suicidal] : not suicidal [Anxiety] : no anxiety [Depression] : depression [Negative] : Neurological [FreeTextEntry9] : see HPI

## 2021-04-28 ENCOUNTER — RX CHANGE (OUTPATIENT)
Age: 51
End: 2021-04-28

## 2021-05-04 ENCOUNTER — NON-APPOINTMENT (OUTPATIENT)
Age: 51
End: 2021-05-04

## 2021-05-06 LAB — HEMOCCULT STL QL IA: POSITIVE

## 2021-05-07 ENCOUNTER — NON-APPOINTMENT (OUTPATIENT)
Age: 51
End: 2021-05-07

## 2021-05-12 ENCOUNTER — RX CHANGE (OUTPATIENT)
Age: 51
End: 2021-05-12

## 2021-05-14 ENCOUNTER — RX CHANGE (OUTPATIENT)
Age: 51
End: 2021-05-14

## 2021-06-03 ENCOUNTER — APPOINTMENT (OUTPATIENT)
Dept: RHEUMATOLOGY | Facility: CLINIC | Age: 51
End: 2021-06-03

## 2021-06-03 ENCOUNTER — APPOINTMENT (OUTPATIENT)
Dept: INTERNAL MEDICINE | Facility: CLINIC | Age: 51
End: 2021-06-03
Payer: COMMERCIAL

## 2021-06-03 DIAGNOSIS — G89.29 OTHER CHRONIC PAIN: ICD-10-CM

## 2021-06-03 PROCEDURE — 99441: CPT

## 2021-06-03 NOTE — HISTORY OF PRESENT ILLNESS
[Other Location: e.g. School (Enter Location, City,State)___] : at [unfilled], at the time of the visit. [Medical Office: ([facility])___] : at the medical office located in  [Verbal consent obtained from patient] : the patient, [unfilled] [de-identified] : As this is telephone visit no physical exam could be performed.  Diagnosis and treatment based upon symptoms provided\par \par Telephonic visit done today to follow-up on his depression and chronic joint pains\par \par He reports that Cymbalta 30 mg daily has been helping with both his depression and his chronic joint pain but he was wondering if he could have a higher dose.  He states he thinks that a higher dose might help more.\par \par No new complaints reported\par \par He states he did see GI for his reported rectal bleeding and positive FIT test.  He states a colonoscopy and EGD have been scheduled.\par \par He states he has made appointment to see rheumatologist\par

## 2021-06-03 NOTE — ASSESSMENT
[FreeTextEntry1] : \par \par Depression:\par -Overall appears to be doing a little better on Cymbalta 30 mg daily and no side effects reported\par -He feels that a higher dose may be more helpful to him\par -will increase Cymbalta to 60 mg daily\par -Follow-up 6 weeks\par \par Arthralgias/joint stiffness/bilateral knee pain:\par - + ZAHRA  1:80 but all f/u testing negative\par -He has appointment to see rheumatology\par -he was seen by Dr martinez for knee OA\par -uses mobic prn\par -Appears Cymbalta has been helping with his joint pain\par -Recent x-ray of right knee showed osteoarthritis\par \par Psoriasis\par -uses clobestasol cream prn-will refill\par -referred him to dermatology previously\par \par Rectal bleeding/+ FIT test:\par -no further sx\par -He was seen by GI and scheduled for EGD and colonoscopy\par \par Lung nodule:\par -Follow-up CT chest 2021 showed stable nodule\par -Follow-up CT chest advised 2022\par \par HTN:\par -on losartan 50mg PO daily\par \par Obesity/Pre-DM/fatty liver:\par -HgA1c 6.0 2021\par \par PORTILLO:\par -on CPAP-followed by pulmonary\par \par Vitamin D Deficiency:\par -Vitamin D 25 OH was normal 2021\par \par Elevated LFTs/fatty liver:\par -hepatitis B and c serologies were negative\par -2021 LFTs were normal\par \par BPH:\par -Sees urologist\par -On Flomax\par -PSA was 0.68 2021\par \par Testicular pain\par -Urologist has ordered a scrotal ultrasound \par \par HCM:\par \par CPE 2018\par \par EKG: 3/2019\par \par Flu shot: 10/2020\par \par Tdap: 2018\par \par Covid vaccine (Pfizer) 3/1/2021 and 3/22/2021\par \par HIV testing: negative 2018\par \par Hepatitis C screening: negative 2015\par \par PSA: 0.68 2021\par \par Depression screenin2021 PHQ 2 score 0\par \par Colonoscopy: Recently seen by GI and colonoscopy has been scheduled\par \par Lipid panel: Normal 2021\par \par F/U 6 weeks

## 2021-06-22 ENCOUNTER — RX RENEWAL (OUTPATIENT)
Age: 51
End: 2021-06-22

## 2021-06-28 ENCOUNTER — APPOINTMENT (OUTPATIENT)
Dept: RHEUMATOLOGY | Facility: CLINIC | Age: 51
End: 2021-06-28
Payer: COMMERCIAL

## 2021-06-28 PROCEDURE — 99072 ADDL SUPL MATRL&STAF TM PHE: CPT

## 2021-06-28 PROCEDURE — 99204 OFFICE O/P NEW MOD 45 MIN: CPT

## 2021-06-28 NOTE — ASSESSMENT
[FreeTextEntry1] : 49 yo man with most likley inflammatory arthritis \par \par --patient is to start nabumetone BID for pain \par --check hep/quantiferon screening in preparation for biologics \par --check inflammatory markers and HLA b27 \par --xrays

## 2021-06-28 NOTE — REVIEW OF SYSTEMS
[Fever] : no fever [Eye Pain] : no eye pain [Red Eyes] : eyes not red [Nosebleeds] : no nosebleeds [Chest Pain] : no chest pain [Palpitations] : no palpitations [Cough] : no cough [SOB on Exertion] : no shortness of breath during exertion [Diarrhea] : no diarrhea

## 2021-06-28 NOTE — PHYSICAL EXAM
[General Appearance - Well Nourished] : well nourished [General Appearance - Well Developed] : well developed [Sclera] : the sclera and conjunctiva were normal [Hearing Threshold Finger Rub Not [place]] : hearing was normal [Nail Clubbing] : no clubbing  or cyanosis of the fingernails [Motor Tone] : muscle strength and tone were normal [FreeTextEntry1] : psoriasis over the knees and elbows shins  [Affect] : the affect was normal [Mood] : the mood was normal

## 2021-06-28 NOTE — HISTORY OF PRESENT ILLNESS
[FreeTextEntry1] : 49 yo man with history of HTN, obesity, FATTY LIVER  enlarged prostate, knee OA, psoriasis presents for evaluation of joint pain.  states that he has joint pain for now about 5 years.  states that he has significant joint stiffness that last about 30-60 minutes.  he has stiffness of the hands, shoulders, feet, back.  states that he feels better when he is active.  if he rest pains worsen.  uses advil with pain relief.  \par \par xrays: right knee with marker Osteoarthritic degenerative changes\par L knee mild OA changes\par shoulder normal \par FOBT positive \par \par labs in the past : \par cbc aND CMP NORMAL \par esr 19 AND CRP 5 \par LYME NEGATIVE\par ZAHRA 1.80 NUCLEOLAR\par NEGATIVE CCP/RF/DSDNA/CAMPBELL/SJOGRENS/SCL 70/DEB/CENTROMERE\par URIC 5.7\par CPK NORMAL\par PSA NORMAL\par TSH NORMAL \par \par patient denies any IBD s/s, history of STDs, sausage digits, nail changes, red painful eye \par lupus ROS negative

## 2021-07-12 ENCOUNTER — APPOINTMENT (OUTPATIENT)
Dept: RHEUMATOLOGY | Facility: CLINIC | Age: 51
End: 2021-07-12
Payer: COMMERCIAL

## 2021-07-12 VITALS
OXYGEN SATURATION: 96 % | SYSTOLIC BLOOD PRESSURE: 126 MMHG | RESPIRATION RATE: 17 BRPM | DIASTOLIC BLOOD PRESSURE: 80 MMHG | HEIGHT: 72 IN | BODY MASS INDEX: 41.99 KG/M2 | HEART RATE: 63 BPM | TEMPERATURE: 98 F | WEIGHT: 310 LBS

## 2021-07-12 PROCEDURE — 99214 OFFICE O/P EST MOD 30 MIN: CPT

## 2021-07-12 PROCEDURE — 99072 ADDL SUPL MATRL&STAF TM PHE: CPT

## 2021-07-12 RX ORDER — MAGNESIUM HYDROXIDE 400 MG/5ML
1200 SUSPENSION, ORAL (FINAL DOSE FORM) ORAL DAILY
Qty: 1 | Refills: 0 | Status: DISCONTINUED | COMMUNITY
Start: 2019-02-14 | End: 2021-07-12

## 2021-07-12 RX ORDER — HYDROCORTISONE 25 MG/G
2.5 CREAM TOPICAL
Qty: 1 | Refills: 1 | Status: DISCONTINUED | COMMUNITY
Start: 2017-07-12 | End: 2021-07-12

## 2021-07-12 NOTE — PHYSICAL EXAM
[General Appearance - Well Nourished] : well nourished [General Appearance - Well Developed] : well developed [Sclera] : the sclera and conjunctiva were normal [Hearing Threshold Finger Rub Not [place]] : hearing was normal [Nail Clubbing] : no clubbing  or cyanosis of the fingernails [Musculoskeletal - Swelling] : no joint swelling seen [Motor Tone] : muscle strength and tone were normal [] : no rash [Skin Lesions] : no skin lesions [Affect] : the affect was normal [Mood] : the mood was normal

## 2021-07-12 NOTE — ASSESSMENT
[FreeTextEntry1] : 49 yo man with history of psoriasis and psoriatic arthritis \par \par --patient is to start MTX with FA \par --will monitor LFTs closely \par --nabumetone as needed \par --hypercalcemia: will repeat and check PTH ( vit d 36)

## 2021-08-02 LAB
25(OH)D3 SERPL-MCNC: 36.3 NG/ML
ALBUMIN SERPL ELPH-MCNC: 4.6 G/DL
ALP BLD-CCNC: 74 U/L
ALT SERPL-CCNC: 38 U/L
ANION GAP SERPL CALC-SCNC: 13 MMOL/L
AST SERPL-CCNC: 26 U/L
BASOPHILS # BLD AUTO: 0.04 K/UL
BASOPHILS NFR BLD AUTO: 0.7 %
BILIRUB SERPL-MCNC: 0.4 MG/DL
BUN SERPL-MCNC: 12 MG/DL
CALCIUM SERPL-MCNC: 10.7 MG/DL
CCP AB SER IA-ACNC: <8 UNITS
CHLORIDE SERPL-SCNC: 99 MMOL/L
CO2 SERPL-SCNC: 26 MMOL/L
CREAT SERPL-MCNC: 0.9 MG/DL
CRP SERPL-MCNC: 5 MG/L
EOSINOPHIL # BLD AUTO: 0.52 K/UL
EOSINOPHIL NFR BLD AUTO: 8.6 %
ERYTHROCYTE [SEDIMENTATION RATE] IN BLOOD BY WESTERGREN METHOD: 27 MM/HR
GLUCOSE SERPL-MCNC: 98 MG/DL
HBV CORE IGG+IGM SER QL: NONREACTIVE
HBV SURFACE AB SER QL: NONREACTIVE
HBV SURFACE AG SER QL: NONREACTIVE
HCT VFR BLD CALC: 44.7 %
HCV AB SER QL: NONREACTIVE
HCV S/CO RATIO: 0.09 S/CO
HGB BLD-MCNC: 15.2 G/DL
HIV1+2 AB SPEC QL IA.RAPID: NONREACTIVE
IMM GRANULOCYTES NFR BLD AUTO: 0.3 %
LYMPHOCYTES # BLD AUTO: 1.4 K/UL
LYMPHOCYTES NFR BLD AUTO: 23 %
M TB IFN-G BLD-IMP: NEGATIVE
MAN DIFF?: NORMAL
MCHC RBC-ENTMCNC: 30 PG
MCHC RBC-ENTMCNC: 34 GM/DL
MCV RBC AUTO: 88.2 FL
MONOCYTES # BLD AUTO: 0.44 K/UL
MONOCYTES NFR BLD AUTO: 7.2 %
NEUTROPHILS # BLD AUTO: 3.66 K/UL
NEUTROPHILS NFR BLD AUTO: 60.2 %
PLATELET # BLD AUTO: 323 K/UL
POTASSIUM SERPL-SCNC: 4.2 MMOL/L
PROT SERPL-MCNC: 7.4 G/DL
QUANTIFERON TB PLUS MITOGEN MINUS NIL: 5 IU/ML
QUANTIFERON TB PLUS NIL: 0.03 IU/ML
QUANTIFERON TB PLUS TB1 MINUS NIL: -0.01 IU/ML
QUANTIFERON TB PLUS TB2 MINUS NIL: -0.01 IU/ML
RBC # BLD: 5.07 M/UL
RBC # FLD: 13 %
RF+CCP IGG SER-IMP: NEGATIVE
RHEUMATOID FACT SER QL: <10 IU/ML
SODIUM SERPL-SCNC: 138 MMOL/L
WBC # FLD AUTO: 6.08 K/UL

## 2021-08-10 ENCOUNTER — NON-APPOINTMENT (OUTPATIENT)
Age: 51
End: 2021-08-10

## 2021-08-11 ENCOUNTER — APPOINTMENT (OUTPATIENT)
Dept: RHEUMATOLOGY | Facility: CLINIC | Age: 51
End: 2021-08-11
Payer: COMMERCIAL

## 2021-08-11 VITALS
WEIGHT: 310 LBS | OXYGEN SATURATION: 95 % | HEIGHT: 72 IN | HEART RATE: 76 BPM | RESPIRATION RATE: 17 BRPM | BODY MASS INDEX: 41.99 KG/M2 | DIASTOLIC BLOOD PRESSURE: 66 MMHG | TEMPERATURE: 98 F | SYSTOLIC BLOOD PRESSURE: 120 MMHG

## 2021-08-11 DIAGNOSIS — Z79.899 ENCOUNTER FOR THERAPEUTIC DRUG LVL MONITORING: ICD-10-CM

## 2021-08-11 DIAGNOSIS — Z51.81 ENCOUNTER FOR THERAPEUTIC DRUG LVL MONITORING: ICD-10-CM

## 2021-08-11 PROCEDURE — 96372 THER/PROPH/DIAG INJ SC/IM: CPT

## 2021-08-11 PROCEDURE — 36415 COLL VENOUS BLD VENIPUNCTURE: CPT

## 2021-08-11 PROCEDURE — 99215 OFFICE O/P EST HI 40 MIN: CPT | Mod: 25

## 2021-08-16 ENCOUNTER — RX RENEWAL (OUTPATIENT)
Age: 51
End: 2021-08-16

## 2021-08-30 ENCOUNTER — APPOINTMENT (OUTPATIENT)
Dept: RHEUMATOLOGY | Facility: CLINIC | Age: 51
End: 2021-08-30
Payer: COMMERCIAL

## 2021-08-30 VITALS
OXYGEN SATURATION: 97 % | HEIGHT: 72 IN | RESPIRATION RATE: 17 BRPM | BODY MASS INDEX: 42.66 KG/M2 | WEIGHT: 315 LBS | HEART RATE: 83 BPM | DIASTOLIC BLOOD PRESSURE: 80 MMHG | SYSTOLIC BLOOD PRESSURE: 120 MMHG | TEMPERATURE: 98.7 F

## 2021-08-30 PROCEDURE — 99214 OFFICE O/P EST MOD 30 MIN: CPT

## 2021-08-30 RX ORDER — ADALIMUMAB 40MG/0.4ML
40 KIT SUBCUTANEOUS
Qty: 2 | Refills: 3 | Status: DISCONTINUED | COMMUNITY
Start: 2021-08-30 | End: 2021-08-30

## 2021-08-30 RX ORDER — PREDNISONE 5 MG/1
5 TABLET ORAL
Qty: 25 | Refills: 0 | Status: DISCONTINUED | COMMUNITY
Start: 2021-08-12 | End: 2021-08-30

## 2021-08-30 RX ORDER — CLOBETASOL PROPIONATE 0.5 MG/G
0.05 CREAM TOPICAL
Qty: 3 | Refills: 0 | Status: DISCONTINUED | COMMUNITY
Start: 2017-07-12 | End: 2021-08-30

## 2021-09-06 ENCOUNTER — RX RENEWAL (OUTPATIENT)
Age: 51
End: 2021-09-06

## 2021-09-15 ENCOUNTER — NON-APPOINTMENT (OUTPATIENT)
Age: 51
End: 2021-09-15

## 2021-09-15 ENCOUNTER — APPOINTMENT (OUTPATIENT)
Dept: RHEUMATOLOGY | Facility: CLINIC | Age: 51
End: 2021-09-15
Payer: COMMERCIAL

## 2021-09-15 PROCEDURE — ZZZZZ: CPT

## 2021-09-20 ENCOUNTER — APPOINTMENT (OUTPATIENT)
Dept: RHEUMATOLOGY | Facility: CLINIC | Age: 51
End: 2021-09-20
Payer: COMMERCIAL

## 2021-09-20 VITALS — RESPIRATION RATE: 17 BRPM | HEIGHT: 72 IN | OXYGEN SATURATION: 95 % | HEART RATE: 98 BPM | TEMPERATURE: 98.7 F

## 2021-09-20 PROCEDURE — 99214 OFFICE O/P EST MOD 30 MIN: CPT

## 2021-09-20 RX ORDER — METHOTREXATE 2.5 MG/1
2.5 TABLET ORAL
Qty: 24 | Refills: 3 | Status: DISCONTINUED | COMMUNITY
Start: 2021-07-12 | End: 2021-09-20

## 2021-09-20 RX ORDER — METHOTREXATE 2.5 MG/1
2.5 TABLET ORAL
Qty: 16 | Refills: 1 | Status: DISCONTINUED | COMMUNITY
Start: 2021-07-12 | End: 2021-09-20

## 2021-09-27 NOTE — ASSESSMENT
[FreeTextEntry1] : 51 man with obesity, psoriasis and PSA \par \par --patient to cont humira with MTX/FA \par --drug monirtoring labs prior to next visit \par --nabumetone for pain

## 2021-09-27 NOTE — PHYSICAL EXAM
[General Appearance - Well Nourished] : well nourished [General Appearance - Well Developed] : well developed [Sclera] : the sclera and conjunctiva were normal [Hearing Threshold Finger Rub Not [place]] : hearing was normal [Nail Clubbing] : no clubbing  or cyanosis of the fingernails [Musculoskeletal - Swelling] : no joint swelling seen [Motor Tone] : muscle strength and tone were normal [Affect] : the affect was normal [Mood] : the mood was normal [FreeTextEntry1] : improved psoriasis plaque of the sole of the feet

## 2021-09-27 NOTE — PHYSICAL EXAM
[General Appearance - Well Nourished] : well nourished [General Appearance - Well Developed] : well developed [Sclera] : the sclera and conjunctiva were normal [Hearing Threshold Finger Rub Not [place]] : hearing was normal [Nail Clubbing] : no clubbing  or cyanosis of the fingernails [Motor Tone] : muscle strength and tone were normal [FreeTextEntry1] : shoulder pain on ROM  [Affect] : the affect was normal [Mood] : the mood was normal

## 2021-09-27 NOTE — HISTORY OF PRESENT ILLNESS
[FreeTextEntry1] : 49 yo man with history of HTN, obesity, FATTY LIVER  enlarged prostate, knee OA, psoriasis and PSA. Patient tried MTX however with little effect.  he takes nabumetone which does help relief some of his joint pain.  he was started on humira and has only given himself one injection.  he continues to have knee pain and hand pain with stiffness.  he will be going her c-scope screening.  he was noted to have positive FOBT.  \par l \par FOBT positive \par \par labs in the past : \par cbc aND CMP NORMAL \par esr 19 AND CRP 5 \par LYME NEGATIVE\par ZAHRA 1.80 NUCLEOLAR\par NEGATIVE CCP/RF/DSDNA/CAMPBELL/SJOGRENS/SCL 70/DEB/CENTROMERE\par URIC 5.7\par CPK NORMAL\par PSA NORMAL\par TSH NORMAL \par hepb/c/quantiferon  negative ( 6/2021)\par \par xrays: \par shoulders normal\par hips normal\par hand normal\par Left foot calcaneal spur\par r foot normal\par R knee marked OA \par L knee mild OA \par ankles with mod calcification of the distal Achilles b/l  and left moderate proximal plantar fascia calcifications \par \par patient denies any IBD s/s, history of STDs, sausage digits, nail changes, red painful eye \par lupus ROS negative \par \par

## 2021-09-27 NOTE — REVIEW OF SYSTEMS
[Fever] : no fever [Chills] : no chills [Eye Pain] : no eye pain [Red Eyes] : eyes not red [Nosebleeds] : no nosebleeds [Chest Pain] : no chest pain [Palpitations] : no palpitations [SOB on Exertion] : no shortness of breath during exertion [Diarrhea] : no diarrhea

## 2021-09-27 NOTE — ASSESSMENT
[FreeTextEntry1] : 50 yo man with morbid obesity,  psoriasis and PSA \par \par --patient to cont MTX 15mg weekly with FA \par --continue HUmira \par --encourage covid vaccine.  patient aware to hold MTX the week of covid vaccine\par --he will discuss pneumo vaccine with PMD \par --cont nabumetone for now \par \par

## 2021-10-04 LAB
ALBUMIN SERPL ELPH-MCNC: 4.3 G/DL
ALP BLD-CCNC: 78 U/L
ALT SERPL-CCNC: 37 U/L
ANION GAP SERPL CALC-SCNC: 10 MMOL/L
AST SERPL-CCNC: 24 U/L
BASOPHILS # BLD AUTO: 0.02 K/UL
BASOPHILS NFR BLD AUTO: 0.3 %
BILIRUB SERPL-MCNC: 0.4 MG/DL
BUN SERPL-MCNC: 14 MG/DL
CALCIUM SERPL-MCNC: 9.6 MG/DL
CALCIUM SERPL-MCNC: 9.7 MG/DL
CHLORIDE SERPL-SCNC: 100 MMOL/L
CO2 SERPL-SCNC: 28 MMOL/L
CREAT SERPL-MCNC: 0.99 MG/DL
CRP SERPL-MCNC: 5 MG/L
EOSINOPHIL # BLD AUTO: 0.21 K/UL
EOSINOPHIL NFR BLD AUTO: 3.2 %
ERYTHROCYTE [SEDIMENTATION RATE] IN BLOOD BY WESTERGREN METHOD: 12 MM/HR
GLUCOSE SERPL-MCNC: 153 MG/DL
HCT VFR BLD CALC: 45.3 %
HGB BLD-MCNC: 14.8 G/DL
IMM GRANULOCYTES NFR BLD AUTO: 0.2 %
LYMPHOCYTES # BLD AUTO: 1.42 K/UL
LYMPHOCYTES NFR BLD AUTO: 21.9 %
MAN DIFF?: NORMAL
MCHC RBC-ENTMCNC: 29.9 PG
MCHC RBC-ENTMCNC: 32.7 GM/DL
MCV RBC AUTO: 91.5 FL
MONOCYTES # BLD AUTO: 0.36 K/UL
MONOCYTES NFR BLD AUTO: 5.6 %
NEUTROPHILS # BLD AUTO: 4.45 K/UL
NEUTROPHILS NFR BLD AUTO: 68.8 %
PARATHYROID HORMONE INTACT: 28 PG/ML
PLATELET # BLD AUTO: 333 K/UL
POTASSIUM SERPL-SCNC: 5.1 MMOL/L
PROT SERPL-MCNC: 6.9 G/DL
RBC # BLD: 4.95 M/UL
RBC # FLD: 14.6 %
SODIUM SERPL-SCNC: 139 MMOL/L
WBC # FLD AUTO: 6.47 K/UL

## 2021-10-19 ENCOUNTER — APPOINTMENT (OUTPATIENT)
Dept: UROLOGY | Facility: CLINIC | Age: 51
End: 2021-10-19
Payer: COMMERCIAL

## 2021-10-19 VITALS — TEMPERATURE: 98.2 F

## 2021-10-19 DIAGNOSIS — Z12.5 ENCOUNTER FOR SCREENING FOR MALIGNANT NEOPLASM OF PROSTATE: ICD-10-CM

## 2021-10-19 PROCEDURE — 99214 OFFICE O/P EST MOD 30 MIN: CPT | Mod: 25

## 2021-10-19 PROCEDURE — 51798 US URINE CAPACITY MEASURE: CPT

## 2021-10-19 PROCEDURE — 51741 ELECTRO-UROFLOWMETRY FIRST: CPT

## 2021-10-26 PROBLEM — Z12.5 SCREENING PSA (PROSTATE SPECIFIC ANTIGEN): Status: RESOLVED | Noted: 2021-04-06 | Resolved: 2021-10-26

## 2021-10-26 NOTE — HISTORY OF PRESENT ILLNESS
[FreeTextEntry1] : 52 yo male presents for follow up. \par Taking Flomax- urinates with reasonable stream, every 1-2 hours or so during the day, nocturia 2-3 x.\par Has post void dribbling. Denies dysuria, hematuria, fever, chills or rigors.\par Has off and on left back pain. On recent chest CT scan: 3 m left upper pole kidney stone. \par No longer bilateral testis pain. \par Maternal uncle has Prostate cancer \par \par Status post cystoscopy and left ureteral stent removal 7/31/19.\par Status post left ureteroscopy, laser lithotripsy, stone extraction and ureteral stent exchange at [facility] on 7/19/19. \par Status post Left ESWL on 3/15/19 at [facility]. \par \par Initially seen for Kidney stone. \par Had seen another Urologist in the past and underwent Cystoscopy for Microhematuria and Prostate biopsy for Prostate nodule.

## 2021-10-26 NOTE — ASSESSMENT
[FreeTextEntry1] : Benign Prostatic Hyperplasia:\par See Uroflo and PVR. \par Discussed treatment options. Will continue Flomax. \par \par Overactive Bladder:\par Discussed prescribing Vesicare. Pt agreeable. Explained side effects- dryness of eyes and mouth, thirst, blurred vision, dyspepsia, nausea, fatigue, somnolence, confusion, headache, constipation and difficulty urinating. \par \par PSA Screening:\par PSA: 0.6(10/9/21). \par Continue PSA Screening. \par \par Kidney stone:\par Will get CT scan. \par Will inform results. \par 24 hour urine test was not done with last submission. \par \par Return to office in 3 months or sooner if any issues.

## 2021-11-04 NOTE — ASU PATIENT PROFILE, ADULT - HARM RISK FACTORS
EXAM:  XR RT TIBIA+FIBULA  11/4/2021 12:18 PM



CLINICAL INDICATION:  Pain in right foot



COMPARISON:  None



TECHNIQUE:  AP and lateral view of the right tibia and fibula



FINDINGS:  The bones are diffusely demineralized. There is no acute fracture. Alignment is normal. Th
ere is no focal soft tissue abnormality. Vascular calcifications are noted.



IMPRESSION:  No acute osseous abnormality of the right tibia and fibula. Osteopenia.



Electronically signed by: Sari Morales MD (11/4/2021 5:06 PM) [id number]
no

## 2021-11-08 ENCOUNTER — RX RENEWAL (OUTPATIENT)
Age: 51
End: 2021-11-08

## 2021-11-22 ENCOUNTER — RX CHANGE (OUTPATIENT)
Age: 51
End: 2021-11-22

## 2021-11-29 ENCOUNTER — APPOINTMENT (OUTPATIENT)
Dept: RHEUMATOLOGY | Facility: CLINIC | Age: 51
End: 2021-11-29
Payer: COMMERCIAL

## 2021-11-29 VITALS
WEIGHT: 315 LBS | OXYGEN SATURATION: 99 % | BODY MASS INDEX: 42.66 KG/M2 | RESPIRATION RATE: 17 BRPM | HEIGHT: 72 IN | SYSTOLIC BLOOD PRESSURE: 122 MMHG | HEART RATE: 92 BPM | TEMPERATURE: 97.6 F | DIASTOLIC BLOOD PRESSURE: 70 MMHG

## 2021-11-29 PROCEDURE — 99214 OFFICE O/P EST MOD 30 MIN: CPT

## 2021-11-29 RX ORDER — NABUMETONE 750 MG/1
750 TABLET, FILM COATED ORAL
Qty: 180 | Refills: 2 | Status: DISCONTINUED | COMMUNITY
Start: 2021-06-28 | End: 2021-11-29

## 2021-11-29 NOTE — PHYSICAL EXAM
[General Appearance - Well Nourished] : well nourished [General Appearance - Well Developed] : well developed [Sclera] : the sclera and conjunctiva were normal [Hearing Threshold Finger Rub Not [place]] : hearing was normal [Nail Clubbing] : no clubbing  or cyanosis of the fingernails [Musculoskeletal - Swelling] : no joint swelling seen [Motor Tone] : muscle strength and tone were normal [] : no rash [Skin Lesions] : no skin lesions [Affect] : the affect was normal [Mood] : the mood was normal

## 2021-11-29 NOTE — HISTORY OF PRESENT ILLNESS
[FreeTextEntry1] : 51 yo man with history of HTN, obesity, FATTY LIVER  enlarged prostate, knee OA, psoriasis and PSA. Patient tried MTX however with little effect.  He has been on Humira for now about 4 months and states that his skin and arthritis is much better.  he denies any more morning stiffness.  joint pain only as the 14 days post Humira start to approach. \par \par FOBT positive \par \par labs in the past : \par cbc aND CMP NORMAL \par esr 19 AND CRP 5 \par LYME NEGATIVE\par ZAHRA 1.80 NUCLEOLAR\par NEGATIVE CCP/RF/DSDNA/CAMPBELL/SJOGRENS/SCL 70/DEB/CENTROMERE\par URIC 5.7\par CPK NORMAL\par PSA NORMAL\par TSH NORMAL \par hepb/c/quantiferon  negative ( 6/2021)\par \par xrays: \par shoulders normal\par hips normal\par hand normal\par Left foot calcaneal spur\par r foot normal\par R knee marked OA \par L knee mild OA \par ankles with mod calcification of the distal Achilles b/l  and left moderate proximal plantar fascia calcifications \par \par patient denies any IBD s/s, history of STDs, sausage digits, nail changes, red painful eye \par lupus ROS negative \par \par

## 2021-11-29 NOTE — ASSESSMENT
[FreeTextEntry1] : improved psoriasis and PSA.  given that Humira effect wears off by day 14, we will increase frequency to every 7 days  \par \par --humira every 7 days \par --cont MTX and FA \par --plabs prior to next visit\par --update vacciens - covid booster and pneumo vac

## 2021-12-17 LAB
ALBUMIN SERPL ELPH-MCNC: 4.6 G/DL
ALP BLD-CCNC: 68 U/L
ALT SERPL-CCNC: 45 U/L
ANION GAP SERPL CALC-SCNC: 14 MMOL/L
AST SERPL-CCNC: 27 U/L
BASOPHILS # BLD AUTO: 0.04 K/UL
BASOPHILS NFR BLD AUTO: 0.6 %
BILIRUB SERPL-MCNC: 0.4 MG/DL
BUN SERPL-MCNC: 11 MG/DL
CALCIUM SERPL-MCNC: 9.6 MG/DL
CHLORIDE SERPL-SCNC: 98 MMOL/L
CO2 SERPL-SCNC: 27 MMOL/L
CREAT SERPL-MCNC: 0.94 MG/DL
CRP SERPL-MCNC: 3 MG/L
EOSINOPHIL # BLD AUTO: 0.59 K/UL
EOSINOPHIL NFR BLD AUTO: 8.3 %
ERYTHROCYTE [SEDIMENTATION RATE] IN BLOOD BY WESTERGREN METHOD: 12 MM/HR
GLUCOSE SERPL-MCNC: 75 MG/DL
HCT VFR BLD CALC: 43.8 %
HGB BLD-MCNC: 14.9 G/DL
IMM GRANULOCYTES NFR BLD AUTO: 0.4 %
LYMPHOCYTES # BLD AUTO: 2.08 K/UL
LYMPHOCYTES NFR BLD AUTO: 29.3 %
MAN DIFF?: NORMAL
MCHC RBC-ENTMCNC: 31.6 PG
MCHC RBC-ENTMCNC: 34 GM/DL
MCV RBC AUTO: 93 FL
MONOCYTES # BLD AUTO: 0.63 K/UL
MONOCYTES NFR BLD AUTO: 8.9 %
NEUTROPHILS # BLD AUTO: 3.74 K/UL
NEUTROPHILS NFR BLD AUTO: 52.5 %
PLATELET # BLD AUTO: 311 K/UL
POTASSIUM SERPL-SCNC: 4.8 MMOL/L
PROT SERPL-MCNC: 7.1 G/DL
RBC # BLD: 4.71 M/UL
RBC # FLD: 13.2 %
SODIUM SERPL-SCNC: 139 MMOL/L
WBC # FLD AUTO: 7.11 K/UL

## 2022-01-03 ENCOUNTER — TRANSCRIPTION ENCOUNTER (OUTPATIENT)
Age: 52
End: 2022-01-03

## 2022-01-18 ENCOUNTER — APPOINTMENT (OUTPATIENT)
Dept: INTERNAL MEDICINE | Facility: CLINIC | Age: 52
End: 2022-01-18
Payer: COMMERCIAL

## 2022-01-18 VITALS
OXYGEN SATURATION: 98 % | HEIGHT: 72 IN | HEART RATE: 68 BPM | WEIGHT: 313 LBS | TEMPERATURE: 98.9 F | BODY MASS INDEX: 42.39 KG/M2 | DIASTOLIC BLOOD PRESSURE: 80 MMHG | RESPIRATION RATE: 16 BRPM | SYSTOLIC BLOOD PRESSURE: 125 MMHG

## 2022-01-18 DIAGNOSIS — Z23 ENCOUNTER FOR IMMUNIZATION: ICD-10-CM

## 2022-01-18 PROCEDURE — G0009: CPT

## 2022-01-18 PROCEDURE — 90732 PPSV23 VACC 2 YRS+ SUBQ/IM: CPT

## 2022-01-18 PROCEDURE — 99213 OFFICE O/P EST LOW 20 MIN: CPT | Mod: 25

## 2022-01-18 RX ORDER — OMEPRAZOLE 20 MG/1
20 CAPSULE, DELAYED RELEASE ORAL DAILY
Qty: 30 | Refills: 1 | Status: DISCONTINUED | COMMUNITY
Start: 2021-11-08 | End: 2022-01-18

## 2022-01-19 NOTE — HISTORY OF PRESENT ILLNESS
[FreeTextEntry8] : Mr. SERGIO FERRO  is    51 year male . \par He is a pt. of Dr. Souza.\par pt. is here to f/u on HTN and get refill on medication.\par h/o psoriatic arthritis sees rheumatology.On Humira infusion, methotrexate, duloxetine 60 mg.\par BPH on flomax 0.4 mg .\par Pt. is over all feeling well.\par \par

## 2022-01-19 NOTE — ASSESSMENT
[FreeTextEntry1] : HTN:\par BP is well controlled on losartan 50mg PO daily.\par medication refilled.\par \par Psoriatic arthritis:\par sees rheumatology.\par on duloxetine 30 mg, Humira infusion, methotrexate 2.5 mg wkly.\par \par received flu and [id number] today.\par \par \par \par \par \par \par \par \par \par \par \par \par

## 2022-01-25 ENCOUNTER — APPOINTMENT (OUTPATIENT)
Dept: UROLOGY | Facility: CLINIC | Age: 52
End: 2022-01-25
Payer: COMMERCIAL

## 2022-01-25 VITALS — TEMPERATURE: 98.1 F

## 2022-01-25 DIAGNOSIS — E83.50 UNSPECIFIED DISORDER OF CALCIUM METABOLISM: ICD-10-CM

## 2022-01-25 DIAGNOSIS — Z12.5 ENCOUNTER FOR SCREENING FOR MALIGNANT NEOPLASM OF PROSTATE: ICD-10-CM

## 2022-01-25 PROCEDURE — 99214 OFFICE O/P EST MOD 30 MIN: CPT

## 2022-01-31 NOTE — ASSESSMENT
[FreeTextEntry1] : Benign Prostatic Hyperplasia:\par Continue Flomax. \par \par Overactive Bladder:\par Recommended to take Solifenacin regularly. \par \par Kidney stone:\par Discussed treatment options. Will observe for now. \par Renal and Bladder Ultrasound and KUB before follow up appointment \par \par Disorder of calcium metabolism:\par Reviewed recent test. \par Reiterated kidney stone prevention diet. \par \par PSA Screening:\par PSA before follow up appointment. \par \par Return to office in 6 months or sooner if any issues.

## 2022-01-31 NOTE — HISTORY OF PRESENT ILLNESS
[FreeTextEntry1] : 52 yo male presents for follow up. \par Taking Flomax daily, takes Solifenacin off and on.\par Urinates with reasonable stream, every 1-2 hours or so during the day, nocturia 2-3 x.\par Has post void dribbling. Denies dysuria, hematuria, fever, chills or rigors.\par Has off and on left back pain and bilateral testis pain. \par \par CT scan(11/21): 5 mm left upper pole kidney stone.  \par \par Status post cystoscopy and left ureteral stent removal 7/31/19.\par Status post left ureteroscopy, laser lithotripsy, stone extraction and ureteral stent exchange at [facility] on 7/19/19. \par Status post Left ESWL on 3/15/19 at [facility]. \par \par Initially seen for Kidney stone. \par Had seen another Urologist in the past and underwent Cystoscopy for Microhematuria and Prostate biopsy for Prostate nodule.

## 2022-01-31 NOTE — PHYSICAL EXAM
[Urethral Meatus] : meatus normal [Penis Abnormality] : normal circumcised penis [Scrotum] : the scrotum was normal [Epididymis] : the epididymides were normal [Testes Tenderness] : no tenderness of the testes [Testes Mass (___cm)] : there were no testicular masses [Prostate Tenderness] : the prostate was not tender [No Prostate Nodules] : no prostate nodules [Prostate Size ___ (0-4)] : prostate size [unfilled] (scale: 0-4) [Normal Appearance] : normal appearance [General Appearance - In No Acute Distress] : no acute distress [Abdomen Soft] : soft [Abdomen Tenderness] : non-tender [Skin Color & Pigmentation] : normal skin color and pigmentation [FreeTextEntry1] : normal peripheral circulation  [] : no respiratory distress [Oriented To Time, Place, And Person] : oriented to person, place, and time [Normal Station and Gait] : the gait and station were normal for the patient's age

## 2022-02-10 ENCOUNTER — RX RENEWAL (OUTPATIENT)
Age: 52
End: 2022-02-10

## 2022-02-12 ENCOUNTER — TRANSCRIPTION ENCOUNTER (OUTPATIENT)
Age: 52
End: 2022-02-12

## 2022-02-25 ENCOUNTER — RX RENEWAL (OUTPATIENT)
Age: 52
End: 2022-02-25

## 2022-02-28 ENCOUNTER — APPOINTMENT (OUTPATIENT)
Dept: RHEUMATOLOGY | Facility: CLINIC | Age: 52
End: 2022-02-28
Payer: COMMERCIAL

## 2022-02-28 VITALS
DIASTOLIC BLOOD PRESSURE: 86 MMHG | HEIGHT: 72 IN | RESPIRATION RATE: 17 BRPM | WEIGHT: 315 LBS | TEMPERATURE: 98.2 F | HEART RATE: 85 BPM | SYSTOLIC BLOOD PRESSURE: 140 MMHG | BODY MASS INDEX: 42.66 KG/M2 | OXYGEN SATURATION: 97 %

## 2022-02-28 PROCEDURE — 99214 OFFICE O/P EST MOD 30 MIN: CPT

## 2022-02-28 NOTE — HISTORY OF PRESENT ILLNESS
[FreeTextEntry1] : 49 yo man with history of HTN, obesity, FATTY LIVER  enlarged prostate, knee OA, psoriasis and PSA. Patient tried MTX however with little effect.  He had been on Humira and both skin and joints were doing much better.  he had a tooth abscess and required antibiotic and tooth extraction.     he never restarted his Humira or MTX.  he state that he felt fine at first and is now starting to notice increase pain \par FOBT positive \par \par labs in the past : \par cbc aND CMP NORMAL \par esr 19 AND CRP 5 \par LYME NEGATIVE\par ZAHRA 1.80 NUCLEOLAR\par NEGATIVE CCP/RF/DSDNA/CAMPBELL/SJOGRENS/SCL 70/DEB/CENTROMERE\par URIC 5.7\par CPK NORMAL\par PSA NORMAL\par TSH NORMAL \par hepb/c/quantiferon  negative ( 6/2021)\par \par xrays: \par shoulders normal\par hips normal\par hand normal\par Left foot calcaneal spur\par r foot normal\par R knee marked OA \par L knee mild OA \par ankles with mod calcification of the distal Achilles b/l  and left moderate proximal plantar fascia calcifications \par \par patient denies any IBD s/s, history of STDs, sausage digits, nail changes, red painful eye \par lupus ROS negative \par \par

## 2022-02-28 NOTE — PHYSICAL EXAM
[General Appearance - Well Nourished] : well nourished [General Appearance - Well Developed] : well developed [Sclera] : the sclera and conjunctiva were normal [Hearing Threshold Finger Rub Not [place]] : hearing was normal [Nail Clubbing] : no clubbing  or cyanosis of the fingernails [Musculoskeletal - Swelling] : no joint swelling seen [Motor Tone] : muscle strength and tone were normal [] : no rash [Skin Lesions] : no skin lesions [Affect] : the affect was normal [Mood] : the mood was normal

## 2022-02-28 NOTE — ASSESSMENT
[FreeTextEntry1] : psoriasis and PSA \par \par --patient is to restart HUMIra every 14 days and mtx 6 tabs weekly with FA \par --drug monitoring olbas prior to next visit \par --cont duloxetine \par

## 2022-03-24 LAB
14-3-3 ETA AG SER IA-MCNC: <0.2 NG/ML
25(OH)D3 SERPL-MCNC: 31 NG/ML
A-TUMOR NECROSIS FACT SERPL-MCNC: <1.7 PG/ML
ALBUMIN SERPL ELPH-MCNC: 4.4 G/DL
ALP BLD-CCNC: 73 U/L
ALT SERPL-CCNC: 43 U/L
ANION GAP SERPL CALC-SCNC: 10 MMOL/L
AST SERPL-CCNC: 30 U/L
BASOPHILS # BLD AUTO: 0.02 K/UL
BASOPHILS NFR BLD AUTO: 0.3 %
BILIRUB SERPL-MCNC: 0.4 MG/DL
BUN SERPL-MCNC: 15 MG/DL
CALCIUM SERPL-MCNC: 9.5 MG/DL
CHLORIDE SERPL-SCNC: 101 MMOL/L
CK SERPL-CCNC: 157 U/L
CO2 SERPL-SCNC: 30 MMOL/L
CREAT SERPL-MCNC: 0.88 MG/DL
CRP SERPL-MCNC: 9 MG/L
EOSINOPHIL # BLD AUTO: 0.53 K/UL
EOSINOPHIL NFR BLD AUTO: 8.9 %
ERYTHROCYTE [SEDIMENTATION RATE] IN BLOOD BY WESTERGREN METHOD: 16 MM/HR
GLUCOSE SERPL-MCNC: 119 MG/DL
HCT VFR BLD CALC: 41 %
HGB BLD-MCNC: 13.7 G/DL
IGNF SERPL-MCNC: <4.2 PG/ML
IL10 SERPL-MCNC: 3 PG/ML
IL12 SERPL-MCNC: <1.9 PG/ML
IL13 SERPL-MCNC: <1.7 PG/ML
IL17A SERPL-MCNC: <1.4 PG/ML
IL2 SERPL-MCNC: 517.4 PG/ML
IL2 SERPL-MCNC: <2.1 PG/ML
IL4 SERPL-MCNC: <2.2 PG/ML
IL6 SERPL-MCNC: <2 PG/ML
IL8 SERPL-MCNC: <3 PG/ML
IMM GRANULOCYTES NFR BLD AUTO: 0.2 %
INTERLEUKIN 1 BETA: <6.5 PG/ML
INTERLEUKIN 5: <2.1 PG/ML
LYMPHOCYTES # BLD AUTO: 1.21 K/UL
LYMPHOCYTES NFR BLD AUTO: 20.3 %
MAN DIFF?: NORMAL
MCHC RBC-ENTMCNC: 30.2 PG
MCHC RBC-ENTMCNC: 33.4 GM/DL
MCV RBC AUTO: 90.3 FL
MONOCYTES # BLD AUTO: 0.36 K/UL
MONOCYTES NFR BLD AUTO: 6.1 %
NEUTROPHILS # BLD AUTO: 3.82 K/UL
NEUTROPHILS NFR BLD AUTO: 64.2 %
PLATELET # BLD AUTO: 308 K/UL
POTASSIUM SERPL-SCNC: 4.2 MMOL/L
PROT SERPL-MCNC: 6.8 G/DL
RBC # BLD: 4.54 M/UL
RBC # FLD: 13.6 %
SODIUM SERPL-SCNC: 141 MMOL/L
TSH SERPL-ACNC: 1.13 UIU/ML
WBC # FLD AUTO: 5.95 K/UL

## 2022-04-25 ENCOUNTER — RX RENEWAL (OUTPATIENT)
Age: 52
End: 2022-04-25

## 2022-04-25 ENCOUNTER — APPOINTMENT (OUTPATIENT)
Dept: RHEUMATOLOGY | Facility: CLINIC | Age: 52
End: 2022-04-25
Payer: COMMERCIAL

## 2022-04-25 LAB
ALBUMIN SERPL ELPH-MCNC: 4.3 G/DL
ALP BLD-CCNC: 77 U/L
ALT SERPL-CCNC: 42 U/L
ANION GAP SERPL CALC-SCNC: 10 MMOL/L
AST SERPL-CCNC: 30 U/L
BASOPHILS # BLD AUTO: 0.03 K/UL
BASOPHILS NFR BLD AUTO: 0.5 %
BILIRUB SERPL-MCNC: 0.4 MG/DL
BUN SERPL-MCNC: 9 MG/DL
CALCIUM SERPL-MCNC: 9.4 MG/DL
CHLORIDE SERPL-SCNC: 101 MMOL/L
CO2 SERPL-SCNC: 28 MMOL/L
CREAT SERPL-MCNC: 0.9 MG/DL
CRP SERPL-MCNC: 4 MG/L
EGFR: 104 ML/MIN/1.73M2
EOSINOPHIL # BLD AUTO: 0.38 K/UL
EOSINOPHIL NFR BLD AUTO: 6.4 %
ERYTHROCYTE [SEDIMENTATION RATE] IN BLOOD BY WESTERGREN METHOD: 14 MM/HR
GLUCOSE SERPL-MCNC: 111 MG/DL
HCT VFR BLD CALC: 42.5 %
HGB BLD-MCNC: 13.8 G/DL
IMM GRANULOCYTES NFR BLD AUTO: 0.2 %
LYMPHOCYTES # BLD AUTO: 1.52 K/UL
LYMPHOCYTES NFR BLD AUTO: 25.7 %
MAN DIFF?: NORMAL
MCHC RBC-ENTMCNC: 30.3 PG
MCHC RBC-ENTMCNC: 32.5 GM/DL
MCV RBC AUTO: 93.4 FL
MONOCYTES # BLD AUTO: 0.52 K/UL
MONOCYTES NFR BLD AUTO: 8.8 %
NEUTROPHILS # BLD AUTO: 3.46 K/UL
NEUTROPHILS NFR BLD AUTO: 58.4 %
PLATELET # BLD AUTO: 319 K/UL
POTASSIUM SERPL-SCNC: 4.3 MMOL/L
PROT SERPL-MCNC: 6.9 G/DL
RBC # BLD: 4.55 M/UL
RBC # FLD: 14.6 %
SODIUM SERPL-SCNC: 139 MMOL/L
WBC # FLD AUTO: 5.92 K/UL

## 2022-04-25 PROCEDURE — 99204 OFFICE O/P NEW MOD 45 MIN: CPT | Mod: 95

## 2022-04-25 PROCEDURE — 99214 OFFICE O/P EST MOD 30 MIN: CPT | Mod: 95

## 2022-04-25 NOTE — ASSESSMENT
[FreeTextEntry1] : 52 yo man with Psoriasis and PSA and knee OA \par \par --patient can try Voltaren gel for knee pain \par --he can also try nabumetone \par --start humira 40 every 7 days \par --cont MTX and FA \par --cont cymbalta \par --if knee not improving will have to aspirate and give steroid injection

## 2022-04-25 NOTE — HISTORY OF PRESENT ILLNESS
[FreeTextEntry1] : 51 yo man with history of HTN, obesity, FATTY LIVER  enlarged prostate, knee OA, psoriasis and PSA. Patient tried MTX however with little effect.  He had been on Humira and both skin and joints were doing much better.  he had a tooth abscess and required antibiotic and tooth extraction.     he never restarted his Humira or MTX.  he state that he felt fine at first and is now starting to notice increase pain \par FOBT positive , had normal c-scope\par \par today: patient has KNee OA.  ortho wants to operate.  patient states that it has become swollen and painful. has never had injections.  he also feels that his hips start to hurt by day 10 of humira injection.  he becomes more stiff and tender.  she is also taking MTX and FA. \par \par labs in the past : \par cbc aND CMP NORMAL \par esr 19 AND CRP 5 \par LYME NEGATIVE\par ZAHRA 1.80 NUCLEOLAR\par NEGATIVE CCP/RF/DSDNA/CAMPBELL/SJOGRENS/SCL 70/DEB/CENTROMERE\par URIC 5.7\par CPK NORMAL\par PSA NORMAL\par TSH NORMAL \par hepb/c/quantiferon  negative ( 6/2021)\par \par xrays: \par shoulders normal\par hips normal\par hand normal\par Left foot calcaneal spur\par r foot normal\par R knee marked OA \par L knee mild OA \par ankles with mod calcification of the distal Achilles b/l  and left moderate proximal plantar fascia calcifications \par \par patient denies any IBD s/s, history of STDs, sausage digits, nail changes, red painful eye \par lupus ROS negative \par \par

## 2022-04-25 NOTE — PHYSICAL EXAM
[General Appearance - Well Nourished] : well nourished [General Appearance - Well Developed] : well developed [Sclera] : the sclera and conjunctiva were normal [Hearing Threshold Finger Rub Not [place]] : hearing was normal [FreeTextEntry1] : on amor I can see supra patella effusion  [Affect] : the affect was normal [Mood] : the mood was normal

## 2022-04-25 NOTE — REASON FOR VISIT
[Home] : at home, [unfilled] , at the time of the visit. [Medical Office: ([facility])___] : at the medical office located in  [Verbal consent obtained from patient] : the patient, [unfilled]

## 2022-05-18 ENCOUNTER — APPOINTMENT (OUTPATIENT)
Dept: INTERNAL MEDICINE | Facility: CLINIC | Age: 52
End: 2022-05-18
Payer: COMMERCIAL

## 2022-05-18 ENCOUNTER — NON-APPOINTMENT (OUTPATIENT)
Age: 52
End: 2022-05-18

## 2022-05-18 VITALS
HEART RATE: 82 BPM | TEMPERATURE: 98.3 F | RESPIRATION RATE: 16 BRPM | DIASTOLIC BLOOD PRESSURE: 70 MMHG | BODY MASS INDEX: 42.12 KG/M2 | HEIGHT: 72 IN | OXYGEN SATURATION: 98 % | SYSTOLIC BLOOD PRESSURE: 114 MMHG | WEIGHT: 311 LBS

## 2022-05-18 DIAGNOSIS — Z13.31 ENCOUNTER FOR SCREENING FOR DEPRESSION: ICD-10-CM

## 2022-05-18 DIAGNOSIS — Z78.9 OTHER SPECIFIED HEALTH STATUS: ICD-10-CM

## 2022-05-18 PROCEDURE — 99396 PREV VISIT EST AGE 40-64: CPT | Mod: 25

## 2022-05-18 PROCEDURE — G0447 BEHAVIOR COUNSEL OBESITY 15M: CPT

## 2022-05-18 PROCEDURE — 96127 BRIEF EMOTIONAL/BEHAV ASSMT: CPT

## 2022-05-18 PROCEDURE — 93000 ELECTROCARDIOGRAM COMPLETE: CPT

## 2022-05-18 PROCEDURE — 36415 COLL VENOUS BLD VENIPUNCTURE: CPT

## 2022-05-18 RX ORDER — FOLIC ACID 1 MG/1
1 TABLET ORAL DAILY
Qty: 90 | Refills: 1 | Status: DISCONTINUED | COMMUNITY
Start: 2021-07-12 | End: 2022-05-18

## 2022-05-23 ENCOUNTER — NON-APPOINTMENT (OUTPATIENT)
Age: 52
End: 2022-05-23

## 2022-05-23 PROBLEM — Z13.31 DEPRESSION SCREENING: Status: ACTIVE | Noted: 2022-05-23

## 2022-05-23 LAB
25(OH)D3 SERPL-MCNC: 22.2 NG/ML
ALBUMIN SERPL ELPH-MCNC: 4.7 G/DL
ALP BLD-CCNC: 77 U/L
ALT SERPL-CCNC: 78 U/L
ANION GAP SERPL CALC-SCNC: 11 MMOL/L
APPEARANCE: CLEAR
AST SERPL-CCNC: 44 U/L
BACTERIA: NEGATIVE
BASOPHILS # BLD AUTO: 0.05 K/UL
BASOPHILS NFR BLD AUTO: 0.8 %
BILIRUB SERPL-MCNC: 0.4 MG/DL
BILIRUBIN URINE: NEGATIVE
BLOOD URINE: NEGATIVE
BUN SERPL-MCNC: 11 MG/DL
CALCIUM SERPL-MCNC: 9.5 MG/DL
CHLORIDE SERPL-SCNC: 100 MMOL/L
CHOLEST SERPL-MCNC: 166 MG/DL
CO2 SERPL-SCNC: 27 MMOL/L
COLOR: NORMAL
CREAT SERPL-MCNC: 0.93 MG/DL
EGFR: 100 ML/MIN/1.73M2
EOSINOPHIL # BLD AUTO: 0.66 K/UL
EOSINOPHIL NFR BLD AUTO: 10.4 %
ESTIMATED AVERAGE GLUCOSE: 131 MG/DL
GLUCOSE QUALITATIVE U: NEGATIVE
GLUCOSE SERPL-MCNC: 97 MG/DL
HBA1C MFR BLD HPLC: 6.2 %
HCT VFR BLD CALC: 42.5 %
HDLC SERPL-MCNC: 38 MG/DL
HGB BLD-MCNC: 14.4 G/DL
HIV1+2 AB SPEC QL IA.RAPID: NONREACTIVE
HYALINE CASTS: 0 /LPF
IMM GRANULOCYTES NFR BLD AUTO: 0.3 %
KETONES URINE: NEGATIVE
LDLC SERPL CALC-MCNC: 106 MG/DL
LEUKOCYTE ESTERASE URINE: NEGATIVE
LYMPHOCYTES # BLD AUTO: 1.85 K/UL
LYMPHOCYTES NFR BLD AUTO: 29.1 %
MAN DIFF?: NORMAL
MCHC RBC-ENTMCNC: 30.5 PG
MCHC RBC-ENTMCNC: 33.9 GM/DL
MCV RBC AUTO: 90 FL
MICROSCOPIC-UA: NORMAL
MONOCYTES # BLD AUTO: 0.61 K/UL
MONOCYTES NFR BLD AUTO: 9.6 %
NEUTROPHILS # BLD AUTO: 3.17 K/UL
NEUTROPHILS NFR BLD AUTO: 49.8 %
NITRITE URINE: NEGATIVE
NONHDLC SERPL-MCNC: 127 MG/DL
PH URINE: 6
PLATELET # BLD AUTO: 290 K/UL
POTASSIUM SERPL-SCNC: 4.7 MMOL/L
PROT SERPL-MCNC: 7.2 G/DL
PROTEIN URINE: NEGATIVE
PSA SERPL-MCNC: 0.7 NG/ML
RBC # BLD: 4.72 M/UL
RBC # FLD: 14.3 %
RED BLOOD CELLS URINE: 1 /HPF
SODIUM SERPL-SCNC: 138 MMOL/L
SPECIFIC GRAVITY URINE: 1.02
SQUAMOUS EPITHELIAL CELLS: 0 /HPF
T4 FREE SERPL-MCNC: 1.2 NG/DL
TRIGL SERPL-MCNC: 108 MG/DL
TSH SERPL-ACNC: 2.37 UIU/ML
UROBILINOGEN URINE: NORMAL
WBC # FLD AUTO: 6.36 K/UL
WHITE BLOOD CELLS URINE: 1 /HPF

## 2022-05-23 NOTE — PHYSICAL EXAM
[No Acute Distress] : no acute distress [Well-Appearing] : well-appearing [Normal Voice/Communication] : normal voice/communication [Normal Sclera/Conjunctiva] : normal sclera/conjunctiva [PERRL] : pupils equal round and reactive to light [EOMI] : extraocular movements intact [Normal Outer Ear/Nose] : the outer ears and nose were normal in appearance [Normal Oropharynx] : the oropharynx was normal [Normal TMs] : both tympanic membranes were normal [No Carotid Bruits] : no carotid bruits [No Edema] : there was no peripheral edema [Normal] : soft, non-tender, non-distended, no masses palpated, no HSM and normal bowel sounds [No CVA Tenderness] : no CVA  tenderness [No Rash] : no rash [No Focal Deficits] : no focal deficits [Normal Affect] : the affect was normal [Normal Insight/Judgement] : insight and judgment were intact [de-identified] : Nasal mucosa is edematous, pale turbinates [de-identified] : No calf tenderness [de-identified] : Depressed mood

## 2022-05-23 NOTE — COUNSELING
[Potential consequences of obesity discussed] : Potential consequences of obesity discussed [Benefits of weight loss discussed] : Benefits of weight loss discussed [Needs reinforcement, provided] : Patient needs reinforcement on understanding of disease, goals and obesity follow-up plan; reinforcement was provided [FreeTextEntry4] : 15

## 2022-05-23 NOTE — HISTORY OF PRESENT ILLNESS
[de-identified] : Here for CPE\par \par He states he has been having anxiety over last several weeks.  He states sx seem to be worse at night.  No stressful events.  he states he is trying to change careers\par \par He reports he has been having a hard time losing weight.  He states he still drinks a lot of soda.  He states he feels that he is addicted to soda.  He does not exercise\par \par He reports he has chronic sinus issues and nasal congestion.  He states he uses Allegra as needed which helps.  He denies fever/chills.  He denies cough.  He denies sore throat.

## 2022-05-23 NOTE — ASSESSMENT
[FreeTextEntry1] : \par \par Depression/anxiety:\par -He is currently on Cymbalta to 60 mg daily\par -I have advised that he meet with behavioral health care manager and referral placed\par -No SI/HI\par -He is to notify office if symptoms persist or worsen\par \par Psoriatic arthritis/OA\par -He is followed by rheumatology\par -He is currently on nabumetone and Voltaren gel as needed\par -He is currently on methotrexate, folic acid, Cymbalta, Humira\par -He states medications have been helping significantly with joint pain\par \par Psoriasis\par -uses clobestasol cream prn\par -referred him to dermatology previously\par \par Rectal bleeding/+ FIT test:\par -no further sx\par -seen by GI and had EGD and colonoscopy 2021 which she reports is normal\par \par Lung nodule:\par -Follow-up CT chest 2021 showed stable nodule\par -Follow-up CT chest advised 2022-I will order this study today\par \par HTN:\par -on losartan 50mg PO daily\par -BP at goal today\par \par Obesity/Pre-DM/fatty liver:\par -HgA1c 6.0 2021\par -His BMI is 42\par -His weight is 311\par -he should adhere to low fat/low cholesterol diet, low carbohydrate diet and weight loss advised\par -I had long conversation with him regarding diet and need to stop drinking soda\par -Risks of obesity discussed at length\par -Benefits of weight loss discussed\par -Exercise advised\par -Check fasting labs\par -Counseling time 15 minutes\par \par PORTILLO:\par -on CPAP-followed by pulmonary\par \par Vitamin D Deficiency:\par -Check Vitamin D 25 [place] \par \par Elevated LFTs/fatty liver:\par -hepatitis B and c serologies were negative\par -Check labs\par -Weight loss advised\par \par BPH:\par -Sees urologist\par -On Flomax\par -PSA was 0.68 2021\par -Check labs including PSA\par \par Allergic rhinitis\par -He states he uses Allegra which does help\par -He is to notify office if symptoms persist or worsen\par -If symptoms persist may consider ENT evaluation\par \par HCM:\par \par CPE 2022\par \par EK2022 NSR at 76 with no ST abnormalities\par \par Flu shot: 2022\par \par Tdap: 2018\par \par Pneumovax: 2022\par \par Covid vaccine (Pfizer x 2-and Moderna booster)\par \par Shingles vaccine advised: He will check with insurance to see if covered\par \par HIV testing: negative 2018-offered HIV testing 2022 and he consented to testing\par \par Hepatitis C screening: negative 2015\par \par PSA: 0.60 10/2021-check PSA\par \par Depression screenin2022 PHQ 2 score 0\par \par Colonoscopy: 2021\par \par Lipid panel: Normal 2021\par \par F/U 6-8 weeks.  Fasting labs ordered and drawn in office today

## 2022-05-23 NOTE — REVIEW OF SYSTEMS
[Nasal Discharge] : nasal discharge [Anxiety] : anxiety [Negative] : Neurological [Fever] : no fever [Chills] : no chills [Fatigue] : no fatigue [Earache] : no earache [Sore Throat] : no sore throat [Chest Pain] : no chest pain [Palpitations] : no palpitations [Lower Ext Edema] : no lower extremity edema [Shortness Of Breath] : no shortness of breath [Wheezing] : no wheezing [Cough] : no cough [Dyspnea on Exertion] : not dyspnea on exertion [Abdominal Pain] : no abdominal pain [Nausea] : no nausea [Diarrhea] : no diarrhea [Vomiting] : no vomiting [Suicidal] : not suicidal [Insomnia] : no insomnia [Depression] : no depression

## 2022-05-23 NOTE — HEALTH RISK ASSESSMENT
[Employed] : employed [] :  [0] : 2) Feeling down, depressed, or hopeless: Not at all (0) [PHQ-2 Negative - No further assessment needed] : PHQ-2 Negative - No further assessment needed [[id number]] : 0 [FreeTextEntry2] : Construction

## 2022-05-26 ENCOUNTER — RX RENEWAL (OUTPATIENT)
Age: 52
End: 2022-05-26

## 2022-06-02 ENCOUNTER — APPOINTMENT (OUTPATIENT)
Dept: RHEUMATOLOGY | Facility: CLINIC | Age: 52
End: 2022-06-02
Payer: COMMERCIAL

## 2022-06-02 VITALS
TEMPERATURE: 98 F | RESPIRATION RATE: 17 BRPM | OXYGEN SATURATION: 98 % | SYSTOLIC BLOOD PRESSURE: 110 MMHG | DIASTOLIC BLOOD PRESSURE: 64 MMHG | HEART RATE: 73 BPM | BODY MASS INDEX: 41.99 KG/M2 | WEIGHT: 310 LBS | HEIGHT: 72 IN

## 2022-06-02 DIAGNOSIS — M70.22 OLECRANON BURSITIS, LEFT ELBOW: ICD-10-CM

## 2022-06-02 PROCEDURE — 99214 OFFICE O/P EST MOD 30 MIN: CPT | Mod: 25

## 2022-06-02 PROCEDURE — 20610 DRAIN/INJ JOINT/BURSA W/O US: CPT | Mod: LT

## 2022-06-02 NOTE — ASSESSMENT
[FreeTextEntry1] : 50 yo man with morbid obesity, depression/anxiety, marked right knee OA ,psoriasis and PSA \par \par \par --we attempted to aspirate olecrnon bursa but this resulted in dry tap \par --patient to take nabumetone BID for 7 days \par --check uric acid ( no history of podagra) \par --reduce MTX to 7.5 weekly with FA \par --cont  HUmira weekly \par --encourage covid vaccine.  patient aware to hold MTX the week of covid vaccine\par --he will discuss pneumo vaccine with PMD \par --monitor LFTs close \par --cont cymbalta and add gabapentin for fibro chronic pain syndrome \par

## 2022-06-02 NOTE — PROCEDURE
[Today's Date:] : Date: [unfilled] [Patient] : the patient [Consent Obtained] : written consent was obtained prior to the procedure and is detailed in the patient's record [Diagnostic] : diagnostic  [#1 Site: ______] : #1 site identified in the [unfilled] [Ethyl Chloride] : ethyl chloride [Chlorhexidine] : chlorhexidine [25 gauge 1.5 inch] : A 25 gauge 1.5 inch needle was used [Tolerated Well] : the patient tolerated the procedure well [No Complications] : there were no complications [Instructions Given] : handouts/patient instructions were given to patient [Patient Instructed to Call] : patient was instructed to call if redness at site, a decrease in range of motion or an increase in pain is noted after procedure. [FreeTextEntry1] : left elbow aspirated resulted in dry tap.  NO steroid was injected

## 2022-06-02 NOTE — HISTORY OF PRESENT ILLNESS
[FreeTextEntry1] : 51 yo man with history of HTN, obesity, depression/anxiety, FATTY LIVER  enlarged prostate, knee OA, psoriasis and PSA. \par FOBT positive , had normal c-scope\par \par today: Patient comes in with complaints of left elbows pain and swelling.  yesterday elbow was red and swollen with pain on ROM.  Today he is better.  he took nabumetone.  Patient denies any trauma to the elbow.  possible rubbing of elbow when he stands and uses elbow for support.  His right knee has been an issue for some time.  he has to hop and wobble due to knee pain and has develop some pain in the buttock area   patient has KNee OA.  ortho wants to operate. patient denies much morning stiffness.  NO much swelling of joints.  skin is clear \par \par labs in the past : \par cbc aND CMP NORMAL \par esr 19 AND CRP 5 \par LYME NEGATIVE\par ZAHRA 1.80 NUCLEOLAR\par NEGATIVE CCP/RF/DSDNA/CAMPBELL/SJOGRENS/SCL 70/DEB/CENTROMERE\par URIC 5.7\par CPK NORMAL\par PSA NORMAL\par TSH NORMAL \par hepb/c/quantiferon  negative ( 6/2021)\par \par xrays: \par shoulders normal\par hips normal\par hand normal\par Left foot calcaneal spur\par r foot normal\par R knee marked OA \par L knee mild OA \par ankles with mod calcification of the distal Achilles b/l  and left moderate proximal plantar fascia calcifications \par \par patient denies any IBD s/s, history of STDs, sausage digits, nail changes, red painful eye \par lupus ROS negative \par \par

## 2022-06-02 NOTE — PHYSICAL EXAM
[General Appearance - Well Nourished] : well nourished [General Appearance - Well Developed] : well developed [Sclera] : the sclera and conjunctiva were normal [Hearing Threshold Finger Rub Not [place]ane] : hearing was normal [Nail Clubbing] : no clubbing  or cyanosis of the fingernails [Musculoskeletal - Swelling] : no joint swelling seen [Motor Tone] : muscle strength and tone were normal [] : no rash [Skin Lesions] : no skin lesions [Affect] : the affect was normal [Mood] : the mood was normal [FreeTextEntry1] : left elbow with indurated olecranon bursa NO erythyema and mildly warm, normla ROM with minimal discomfort

## 2022-06-08 LAB
CRP SERPL-MCNC: 6 MG/L
ERYTHROCYTE [SEDIMENTATION RATE] IN BLOOD BY WESTERGREN METHOD: 15 MM/HR
URATE SERPL-MCNC: 6.2 MG/DL

## 2022-06-13 ENCOUNTER — APPOINTMENT (OUTPATIENT)
Dept: RHEUMATOLOGY | Facility: CLINIC | Age: 52
End: 2022-06-13

## 2022-06-24 ENCOUNTER — RX RENEWAL (OUTPATIENT)
Age: 52
End: 2022-06-24

## 2022-07-06 ENCOUNTER — APPOINTMENT (OUTPATIENT)
Dept: INTERNAL MEDICINE | Facility: CLINIC | Age: 52
End: 2022-07-06

## 2022-07-06 DIAGNOSIS — F41.9 ANXIETY DISORDER, UNSPECIFIED: ICD-10-CM

## 2022-07-06 PROCEDURE — 99213 OFFICE O/P EST LOW 20 MIN: CPT | Mod: 95

## 2022-07-06 NOTE — ASSESSMENT
[FreeTextEntry1] : \par \par Depression/anxiety:\par -He is currently on Cymbalta to 60 mg daily\par -He reports he is doing well\par -He states he was unable to schedule a time with behavioral health care manager due to scheduling conflicts.  He states he does not feel that it is necessary at this time\par -He will follow-up in 3 months\par \par Psoriatic arthritis/OA\par -He is followed by rheumatology\par -He is currently on methotrexate, folic acid, Cymbalta, Humira, and gabapentin\par -He states overall feels that he is doing well\par \par Psoriasis\par -uses clobestasol cream prn\par -referred him to dermatology previously\par \par Lung nodule:\par -Follow-up CT chest 2021 showed stable nodule\par -Follow-up CT chest advised ordered at last visit.  He states he has appointment scheduled for this\par \par HTN:\par -on losartan 50mg PO daily\par \par Obesity/Pre-DM/fatty liver:\par -HgA1c 6.2 2022\par -He states he is lost a few pounds through diet modification\par -He states he is drinking less soda and trying to eat healthier\par -he should adhere to low fat/low cholesterol diet, low carbohydrate diet and weight loss advised\par \par PORTILLO:\par -on CPAP-followed by pulmonary\par \par Vitamin D Deficiency:\par -OTC vitamin D3 1000 units once a day\par \par Elevated LFTs/fatty liver:\par -hepatitis B and c serologies were negative\par -Weight loss advised\par -His last AST/ALT were 44/78\par -Check labs again prior to next visit\par \par BPH:\par -Sees urologist-he states he has appointment this month\par -On Flomax\par -PSA was 0.7 2022\par \par HCM:\par \par CPE 2022\par \par EK2022 \par \par Flu shot: 2022\par \par Tdap: 2018\par \par Pneumovax: 2022\par \par Covid vaccine (Pfizer x 2-Moderna booster)\par \par Shingles vaccine advised previously\par \par HIV testing: negative 2022\par \par Hepatitis C screening: negative 2015\par \par PSA: 0.70 2022\par \par Depression screenin2022 PHQ 2 score 0\par \par Colonoscopy: 2021\par \par F/U 3 months for BP check.  I will send him order for blood work to be done prior to next visit

## 2022-07-06 NOTE — HISTORY OF PRESENT ILLNESS
[Home] : at home, [unfilled] , at the time of the visit. [Medical Office: ([facility])___] : at the medical office located in  [Verbal consent obtained from patient] : the patient, [unfilled] [de-identified] : As this is telemedicine visit no physical exam could be performed.  Diagnosis and treatment based upon symptoms provided\par \par Rolando scheduled telemedicine visit today to follow-up on his anxiety/depression\par \par He states he is overall doing very well.\par \par He states he feels that his anxiety and depression are well controlled with Cymbalta\par \par

## 2022-07-06 NOTE — REVIEW OF SYSTEMS
[Fever] : no fever [Chills] : no chills [Anxiety] : no anxiety [Depression] : no depression [Negative] : Psychiatric [FreeTextEntry2] : He states he has lost a few pounds through diet modification

## 2022-07-08 ENCOUNTER — APPOINTMENT (OUTPATIENT)
Dept: ORTHOPEDIC SURGERY | Facility: CLINIC | Age: 52
End: 2022-07-08

## 2022-07-08 VITALS
WEIGHT: 310 LBS | DIASTOLIC BLOOD PRESSURE: 79 MMHG | HEART RATE: 87 BPM | SYSTOLIC BLOOD PRESSURE: 124 MMHG | BODY MASS INDEX: 41.99 KG/M2 | HEIGHT: 72 IN

## 2022-07-08 PROCEDURE — 99214 OFFICE O/P EST MOD 30 MIN: CPT

## 2022-07-08 PROCEDURE — 73562 X-RAY EXAM OF KNEE 3: CPT | Mod: RT

## 2022-07-08 PROCEDURE — 72170 X-RAY EXAM OF PELVIS: CPT

## 2022-07-08 NOTE — HISTORY OF PRESENT ILLNESS
[Pain Location] : pain [Worsening] : worsening [___ yrs] : [unfilled] year(s) ago [5] : a current pain level of 5/10 [7] : a maximum pain level of 7/10 [Sitting] : sitting [Standing] : standing [Daily] : ~He/She~ states the symptoms seem to be occuring daily [Walking] : worsened by walking [Rest] : relieved by rest [de-identified] : is a 51-year-old male with posttraumatic end-stage osteoarthritis patient was seen in 2020 total knee replacement was indicated the patient the latest surgeries due to covid pandemic \par \par The patient is s/p right knee cadaveric ACL reconstruction from about 3 years ago with Dr. Dominguez. \par He rates pain 6/10 in severity and describes pain as achy and sharp. Pain is worse with ambulating, bending, navigating stairs, and climbing ladders. The pain limits him from exercising. Pain is better with rest and ice. He reports instability in the right knee and he uses a brace. He has attempted cortisone injections with some relief in the past. Patient is employed as an . \par \par He states the symptoms are worsening. Pain levels include a current pain level of 6/10. \par He states the symptoms seem to be constant. \par \par Modifying factors - worsened by bending and worsened by walking. Relieving factors include relieved by ice and relieved by rest. \par pt also c/l left hip pain\par he is on humira and methotraxate for psoriasis and

## 2022-07-08 NOTE — PHYSICAL EXAM
[Antalgic] : not antalgic [de-identified] : GENERAL APPEARANCE: Well nourished and hydrated, pleasant, alert, and oriented x 3. Appears their stated age. \par HEENT: Normocephalic, extraocular eye motion intact. Nasal septum midline. Oral cavity clear. External auditory canal clear. \par RESPIRATORY: Breath sounds clear and audible in all lobes. No wheezing, No accessory muscle use.\par CARDIOVASCULAR: No apparent abnormalities. No lower leg edema. No varicosities. Pedal pulses are palpable.\par NEUROLOGIC: Sensation is normal, no muscle weakness in the upper or lower extremities.\par DERMATOLOGIC: No apparent skin lesions, moist, warm, no rash.\par SPINE: Cervical spine appears normal and moves freely; thoracic spine appears normal and moves freely; lumbosacral spine appears normal and moves freely, normal, nontender.\par MUSCULOSKELETAL: Hands, wrists, and elbows are normal and move freely, shoulders are normal and move freely. \par Musculoskeletal: Gait: normal and not antalgic . Right knee exam shows varus deformity, medial joint line tenderness, crepitus with ROM, his range of motion is preserved. \par 5/5 motor strength in bilateral lower extremities. Sensory: Intact in bilateral lower extremities. DTRs: Biceps, brachioradialis, triceps, patellar, ankle and plantar 2+ and symmetric bilaterally. Pulses: dorsalis pedis, posterior tibial, femoral, popliteal, and radial 2+ and symmetric bilaterally. \par Constitutional: Alert and in no acute distress and obese, but well-appearing and not in acute distress. \par  [de-identified] : Left hip examination shows no groin pain with straight leg raise range of motion is preserved mild pain over the greater trochanter bursa [de-identified] :  3V Xray of the right knee done in office today and  demonstrates severe tricompartmental posttraumatic osteoarthritis. \par x-ray pelvic demonstrate a well-preserved joints without findings of any evidence of fracture, dislocation,\par  or any other acute orthopedic pathology. There is No radiographic features of OA are present.\par

## 2022-07-08 NOTE — DISCUSSION/SUMMARY
[Surgical risks reviewed] : Surgical risks reviewed [de-identified] : 51 year old male with severe tricompartmental posttraumatic osteoarthritis of the right knee. The patient is a candidate for right TKA. He had previous ACL reconstruction from a work-related injury. This progression of OA is secondary to this injury.  he feel he is ready to schedule right TKA. he will need to stop humira 1 week prior to sx and 3 weeks after the operation. conitnue methotrexate.   he has normal hip xray. I reassured him. \par I answered all question regarding the TKA. we discussed risk for foot drop with presence of valgus defromity\par \par The patient has been counseled regarding the elevated risks associated with surgical complications in patients with a BMI> 35. The patient demonstrates an understanding of the increased risk. After a lengthy discussion, the patient agreed to make a coordinated effort at weight loss prior to surgical intervention. The patient understands our BMI policy and they will make a conscious effort to improve their BMI.\par \par \par The patient is a 51 year old individual with end stage arthritis of their right knee joint. Based upon the patient's continued symptoms and failure to respond to conservative treatment I have recommended a right knee replacement for this patient. A long discussion took place with the patient describing what a total joint replacement is and what the procedure would entail. A total knee model, similar to the implant that will be used during the operation, was utilized to demonstrate and to discuss the various bearing surfaces of the implants. The hospitalization and post-operative care and rehabilitation were also discussed. The use of perioperative antibiotics and DVT prophylaxis were discussed. The risk, benefits and alternatives to a surgical intervention were discussed at length with the patient. The patient was also advised of risks related to the medical comorbidities and elevated body mass index (BMI).  A lengthy discussion took place to review the most common complications including but not limited to: deep vein thrombosis, pulmonary embolus, heart attack, stroke, infection, wound breakdown, numbness, damage to nerves, tendon, muscles, arteries or other blood vessels, death and other possible complications from anesthesia. The patient was told that we will take steps to minimize these risks by using sterile technique, antibiotics and DVT prophylaxis when appropriate and follow the patient postoperatively in the office setting to monitor progress. The possibility of recurrent pain, no improvement in pain and actual worsening of pain were also discussed with the patient.\par The discharge plan of care focused on the patient going home following surgery.  The patient was encouraged to make the necessary arrangements to have someone stay with them when they are discharged home.  Following discharge, a home care nurse will visit the patient.  The home care nurse will open your home care case and request home physical therapy services.  Home physical therapy will commence following discharge provided it is appropriate and covered by the health insurance benefit plan. \par The benefits of surgery were discussed with the patient including the potential for improving his/her current clinical condition through operative intervention. Alternatives to surgical intervention including continued conservative management were also discussed in detail. All questions were answered to the satisfaction of the patient. The treatment plan of care, as well as a model of a total knee equivalent to the one that will be used for their total joint replacement, was shared with the patient.  The patient agreed to the plan of care as well as the use of implants in their total joint replacement.\par

## 2022-07-18 ENCOUNTER — APPOINTMENT (OUTPATIENT)
Dept: RHEUMATOLOGY | Facility: CLINIC | Age: 52
End: 2022-07-18

## 2022-07-18 VITALS
RESPIRATION RATE: 17 BRPM | HEART RATE: 79 BPM | DIASTOLIC BLOOD PRESSURE: 70 MMHG | SYSTOLIC BLOOD PRESSURE: 120 MMHG | HEIGHT: 72 IN | TEMPERATURE: 98.2 F | OXYGEN SATURATION: 97 %

## 2022-07-18 PROCEDURE — 99214 OFFICE O/P EST MOD 30 MIN: CPT

## 2022-07-18 NOTE — HISTORY OF PRESENT ILLNESS
[FreeTextEntry1] : 49 yo man with history of HTN, obesity, depression/anxiety, FATTY LIVER  enlarged prostate, knee OA, psoriasis and PSA. \par FOBT positive , had normal c-scope\par \par today: Patient comes in with complaints of right ankle pain over the achilles.  states that on friday he was working on a ladder and developed this golf ball in the posterior ankle.  this has improved and swelling has reduced.  [pain was severe and could not walk.  ankle pain better when he stretches.   on last visit we reduced MTX due to Abnormal LFTs.  he is on MTX 3 tabs with FA and humira once a week.  he notice a small psoriasis outbreak and thinks that MTX 6 tabs was better.  he is also on gabapentin 100 in AM and cymbalta at bedtime.   \par \par Of note patient was seen by ortho and will be getting knee surgery aug 16 2022\par labs in the past : \par cbc aND CMP NORMAL \par esr 19 AND CRP 5 \par LYME NEGATIVE\par ZAHRA 1.80 NUCLEOLAR\par NEGATIVE CCP/RF/DSDNA/CAMPBELL/SJOGRENS/SCL 70/DEB/CENTROMERE\par URIC 5.7\par CPK NORMAL\par PSA NORMAL\par TSH NORMAL \par hepb/c/quantiferon  negative ( 6/2021)\par \par xrays: \par shoulders normal\par hips normal\par hand normal\par Left foot calcaneal spur\par r foot normal\par R knee marked OA \par L knee mild OA \par ankles with mod calcification of the distal Achilles b/l  and left moderate proximal plantar fascia calcifications \par \par patient denies any IBD s/s, history of STDs, sausage digits, nail changes, red painful eye \par lupus ROS negative \par \par

## 2022-07-18 NOTE — PHYSICAL EXAM
[General Appearance - Well Nourished] : well nourished [General Appearance - Well Developed] : well developed [Sclera] : the sclera and conjunctiva were normal [Hearing Threshold Finger Rub Not [place]] : hearing was normal [Nail Clubbing] : no clubbing  or cyanosis of the fingernails [Motor Tone] : muscle strength and tone were normal [FreeTextEntry1] : pain over posterior ankle at the achilles tendon, mild swelling noted, allows plantar and dorsiflexion, ankle normal ROM, no sauage digits, elbows nontender, shoulder FROM,  [] : no rash [Skin Lesions] : no skin lesions [Affect] : the affect was normal [Mood] : the mood was normal

## 2022-07-18 NOTE — ASSESSMENT
[FreeTextEntry1] : 52 yo man with morbid obesity, depression/anxiety, marked right knee OA ,psoriasis and PSA .  Today with acute achillis tendinotis s/p long work on ladder \par \par --patient is to stretch area and start nabumetone BID for 7 days \par --if no improvement in 7 days. we will do imaging \par --will increase  MTX to 10mg  weekly with FA \par --cont  HUmira weekly \par --encourage covid vaccine.  patient aware to hold MTX the week of covid vaccine\par --he will discuss pneumo vaccine with PMD \par --monitor LFTs close \par --cont cymbalta and gabapentin for fibro chronic pain syndrome \par

## 2022-07-26 ENCOUNTER — APPOINTMENT (OUTPATIENT)
Dept: UROLOGY | Facility: CLINIC | Age: 52
End: 2022-07-26

## 2022-07-27 ENCOUNTER — FORM ENCOUNTER (OUTPATIENT)
Age: 52
End: 2022-07-27

## 2022-07-27 ENCOUNTER — APPOINTMENT (OUTPATIENT)
Dept: RHEUMATOLOGY | Facility: CLINIC | Age: 52
End: 2022-07-27

## 2022-07-27 ENCOUNTER — NON-APPOINTMENT (OUTPATIENT)
Age: 52
End: 2022-07-27

## 2022-07-27 VITALS
HEART RATE: 96 BPM | TEMPERATURE: 97.9 F | SYSTOLIC BLOOD PRESSURE: 125 MMHG | RESPIRATION RATE: 17 BRPM | DIASTOLIC BLOOD PRESSURE: 75 MMHG | HEIGHT: 72 IN | OXYGEN SATURATION: 96 %

## 2022-07-27 DIAGNOSIS — M19.90 UNSPECIFIED OSTEOARTHRITIS, UNSPECIFIED SITE: ICD-10-CM

## 2022-07-27 DIAGNOSIS — K76.0 FATTY (CHANGE OF) LIVER, NOT ELSEWHERE CLASSIFIED: ICD-10-CM

## 2022-07-27 DIAGNOSIS — M50.20 OTHER CERVICAL DISC DISPLACEMENT, UNSPECIFIED CERVICAL REGION: ICD-10-CM

## 2022-07-27 DIAGNOSIS — M25.50 PAIN IN UNSPECIFIED JOINT: ICD-10-CM

## 2022-07-27 DIAGNOSIS — G89.29 OTHER CHRONIC PAIN: ICD-10-CM

## 2022-07-27 DIAGNOSIS — Z79.899 OTHER LONG TERM (CURRENT) DRUG THERAPY: ICD-10-CM

## 2022-07-27 DIAGNOSIS — K21.9 GASTRO-ESOPHAGEAL REFLUX DISEASE W/OUT ESOPHAGITIS: ICD-10-CM

## 2022-07-27 PROCEDURE — 99214 OFFICE O/P EST MOD 30 MIN: CPT

## 2022-07-27 RX ORDER — AMMONIUM LACTATE 12 %
12 CREAM (GRAM) TOPICAL
Qty: 385 | Refills: 0 | Status: ACTIVE | COMMUNITY
Start: 2022-01-26

## 2022-07-28 ENCOUNTER — OUTPATIENT (OUTPATIENT)
Dept: OUTPATIENT SERVICES | Facility: HOSPITAL | Age: 52
LOS: 1 days | End: 2022-07-28
Payer: COMMERCIAL

## 2022-07-28 ENCOUNTER — FORM ENCOUNTER (OUTPATIENT)
Age: 52
End: 2022-07-28

## 2022-07-28 VITALS
SYSTOLIC BLOOD PRESSURE: 119 MMHG | TEMPERATURE: 98 F | HEIGHT: 72 IN | RESPIRATION RATE: 17 BRPM | HEART RATE: 81 BPM | DIASTOLIC BLOOD PRESSURE: 75 MMHG | WEIGHT: 301.59 LBS | OXYGEN SATURATION: 98 %

## 2022-07-28 DIAGNOSIS — I10 ESSENTIAL (PRIMARY) HYPERTENSION: ICD-10-CM

## 2022-07-28 DIAGNOSIS — Z91.89 OTHER SPECIFIED PERSONAL RISK FACTORS, NOT ELSEWHERE CLASSIFIED: ICD-10-CM

## 2022-07-28 DIAGNOSIS — Z98.890 OTHER SPECIFIED POSTPROCEDURAL STATES: Chronic | ICD-10-CM

## 2022-07-28 DIAGNOSIS — M17.11 UNILATERAL PRIMARY OSTEOARTHRITIS, RIGHT KNEE: ICD-10-CM

## 2022-07-28 DIAGNOSIS — Z01.818 ENCOUNTER FOR OTHER PREPROCEDURAL EXAMINATION: ICD-10-CM

## 2022-07-28 LAB
A1C WITH ESTIMATED AVERAGE GLUCOSE RESULT: 5.5 % — SIGNIFICANT CHANGE UP (ref 4–5.6)
ANION GAP SERPL CALC-SCNC: 12 MMOL/L — SIGNIFICANT CHANGE UP (ref 5–17)
APTT BLD: 29.6 SEC — SIGNIFICANT CHANGE UP (ref 27.5–35.5)
BASOPHILS # BLD AUTO: 0.03 K/UL — SIGNIFICANT CHANGE UP (ref 0–0.2)
BASOPHILS NFR BLD AUTO: 0.4 % — SIGNIFICANT CHANGE UP (ref 0–2)
BLD GP AB SCN SERPL QL: SIGNIFICANT CHANGE UP
BUN SERPL-MCNC: 17.2 MG/DL — SIGNIFICANT CHANGE UP (ref 8–20)
CALCIUM SERPL-MCNC: 9.4 MG/DL — SIGNIFICANT CHANGE UP (ref 8.4–10.5)
CHLORIDE SERPL-SCNC: 99 MMOL/L — SIGNIFICANT CHANGE UP (ref 98–107)
CO2 SERPL-SCNC: 28 MMOL/L — SIGNIFICANT CHANGE UP (ref 22–29)
CREAT SERPL-MCNC: 1.02 MG/DL — SIGNIFICANT CHANGE UP (ref 0.5–1.3)
EGFR: 89 ML/MIN/1.73M2 — SIGNIFICANT CHANGE UP
EOSINOPHIL # BLD AUTO: 0.4 K/UL — SIGNIFICANT CHANGE UP (ref 0–0.5)
EOSINOPHIL NFR BLD AUTO: 5.6 % — SIGNIFICANT CHANGE UP (ref 0–6)
ESTIMATED AVERAGE GLUCOSE: 111 MG/DL — SIGNIFICANT CHANGE UP (ref 68–114)
GLUCOSE SERPL-MCNC: 97 MG/DL — SIGNIFICANT CHANGE UP (ref 70–99)
HCT VFR BLD CALC: 40 % — SIGNIFICANT CHANGE UP (ref 39–50)
HGB BLD-MCNC: 14.3 G/DL — SIGNIFICANT CHANGE UP (ref 13–17)
IMM GRANULOCYTES NFR BLD AUTO: 0.1 % — SIGNIFICANT CHANGE UP (ref 0–1.5)
INR BLD: 1.1 RATIO — SIGNIFICANT CHANGE UP (ref 0.88–1.16)
LYMPHOCYTES # BLD AUTO: 1.9 K/UL — SIGNIFICANT CHANGE UP (ref 1–3.3)
LYMPHOCYTES # BLD AUTO: 26.8 % — SIGNIFICANT CHANGE UP (ref 13–44)
MCHC RBC-ENTMCNC: 31.4 PG — SIGNIFICANT CHANGE UP (ref 27–34)
MCHC RBC-ENTMCNC: 35.8 GM/DL — SIGNIFICANT CHANGE UP (ref 32–36)
MCV RBC AUTO: 87.9 FL — SIGNIFICANT CHANGE UP (ref 80–100)
MONOCYTES # BLD AUTO: 0.5 K/UL — SIGNIFICANT CHANGE UP (ref 0–0.9)
MONOCYTES NFR BLD AUTO: 7.1 % — SIGNIFICANT CHANGE UP (ref 2–14)
NEUTROPHILS # BLD AUTO: 4.24 K/UL — SIGNIFICANT CHANGE UP (ref 1.8–7.4)
NEUTROPHILS NFR BLD AUTO: 60 % — SIGNIFICANT CHANGE UP (ref 43–77)
PLATELET # BLD AUTO: 296 K/UL — SIGNIFICANT CHANGE UP (ref 150–400)
POTASSIUM SERPL-MCNC: 4.6 MMOL/L — SIGNIFICANT CHANGE UP (ref 3.5–5.3)
POTASSIUM SERPL-SCNC: 4.6 MMOL/L — SIGNIFICANT CHANGE UP (ref 3.5–5.3)
PROTHROM AB SERPL-ACNC: 12.8 SEC — SIGNIFICANT CHANGE UP (ref 10.5–13.4)
RBC # BLD: 4.55 M/UL — SIGNIFICANT CHANGE UP (ref 4.2–5.8)
RBC # FLD: 12.4 % — SIGNIFICANT CHANGE UP (ref 10.3–14.5)
SODIUM SERPL-SCNC: 139 MMOL/L — SIGNIFICANT CHANGE UP (ref 135–145)
WBC # BLD: 7.08 K/UL — SIGNIFICANT CHANGE UP (ref 3.8–10.5)
WBC # FLD AUTO: 7.08 K/UL — SIGNIFICANT CHANGE UP (ref 3.8–10.5)

## 2022-07-28 PROCEDURE — G0463: CPT

## 2022-07-28 PROCEDURE — 93005 ELECTROCARDIOGRAM TRACING: CPT

## 2022-07-28 PROCEDURE — 93010 ELECTROCARDIOGRAM REPORT: CPT

## 2022-07-28 RX ORDER — MELOXICAM 15 MG/1
1 TABLET ORAL
Qty: 0 | Refills: 0 | DISCHARGE

## 2022-07-28 NOTE — H&P PST ADULT - NEGATIVE CARDIOVASCULAR SYMPTOMS
no chest pain/no palpitations/no dyspnea on exertion/no peripheral edema no chest pain/no palpitations/no dyspnea on exertion/no orthopnea/no paroxysmal nocturnal dyspnea/no peripheral edema/no claudication

## 2022-07-28 NOTE — H&P PST ADULT - GASTROINTESTINAL
details… normal/soft/nontender/nondistended/normal active bowel sounds/no guarding/no rigidity/no masses palpable

## 2022-07-28 NOTE — H&P PST ADULT - PROBLEM SELECTOR PLAN 3
Advised patient to hold losartan the morning of the procedure  EKG done at [facility]  Monitor BP

## 2022-07-28 NOTE — H&P PST ADULT - NSICDXFAMILYHX_GEN_ALL_CORE_FT
FAMILY HISTORY:  Mother  Still living? No  Family history of diabetes mellitus, Age at diagnosis: Age Unknown  Family history of renal failure, Age at diagnosis: Age Unknown FAMILY HISTORY:  Mother  Still living? No  Family history of diabetes mellitus, Age at diagnosis: Age Unknown  Family history of renal failure, Age at diagnosis: Age Unknown

## 2022-07-28 NOTE — H&P PST ADULT - NSICDXPASTMEDICALHX_GEN_ALL_CORE_FT
PAST MEDICAL HISTORY:  Arthralgia of right knee     Deviated septum     Fatty liver     Flank pain Left    Hemorrhoids, unspecified hemorrhoid type     Hypertension, unspecified type     Kidney stone on left side     Nasal congestion     Obesity     Obstructive sleep apnea syndrome CPAP Machine    Psoriasis PAST MEDICAL HISTORY:  Arthralgia of right knee     BPH (benign prostatic hyperplasia)     Deviated septum     Fatty liver     Flank pain Left    Hemorrhoids, unspecified hemorrhoid type     Hypertension, unspecified type     Kidney stone on left side     Nasal congestion     Obesity     Obstructive sleep apnea syndrome CPAP Machine    Psoriasis

## 2022-07-28 NOTE — H&P PST ADULT - NSICDXPASTSURGICALHX_GEN_ALL_CORE_FT
PAST SURGICAL HISTORY:  H/O arthroscopy of knee Right 2014    H/O cystoscopy 2016    S/P carpal tunnel release BIlateral 2015 PAST SURGICAL HISTORY:  H/O arthroscopy of knee Right 2014    H/O cystoscopy 2016    S/P carpal tunnel release BIlateral 2015

## 2022-07-28 NOTE — H&P PST ADULT - LAB RESULTS AND INTERPRETATION
labs pending MSSA+ patient informed and given detailed instructions about the treatment, E- scribed meds to pharmacy. Doctors office informed.

## 2022-07-28 NOTE — H&P PST ADULT - ASSESSMENT
Patient educated on surgical scrub, COVID testing, preadmission instructions, medical clearance and day of procedure medications, verbalizes understanding. Pt instructed to stop vitamins/supplements/herbal medications/ASA/NSAIDS for one week prior to surgery and discuss with PMD.    CAPRINI SCORE    AGE RELATED RISK FACTORS                                                             [ ] Age 41-60 years                                            (1 Point)  [ ] Age: 61-74 years                                           (2 Points)                 [ ] Age= 75 years                                                (3 Points)             DISEASE RELATED RISK FACTORS                                                       [ ] Edema in the lower extremities                 (1 Point)                     [ ] Varicose veins                                               (1 Point)                                 [ ] BMI > 25 Kg/m2                                            (1 Point)                                  [ ] Serious infection (ie PNA)                            (1 Point)                     [ ] Lung disease ( COPD, Emphysema)            (1 Point)                                                                          [ ] Acute myocardial infarction                         (1 Point)                  [ ] Congestive heart failure (in the previous month)  (1 Point)         [ ] Inflammatory bowel disease                            (1 Point)                  [ ] Central venous access, PICC or Port               (2 points)       (within the last month)                                                                [ ] Stroke (in the previous month)                        (5 Points)    [ ] Previous or present malignancy                       (2 points)                                                                                                                                                         HEMATOLOGY RELATED FACTORS                                                         [ ] Prior episodes of VTE                                     (3 Points)                     [ ] Positive family history for VTE                      (3 Points)                  [ ] Prothrombin 85972 A                                     (3 Points)                     [ ] Factor V Leiden                                                (3 Points)                        [ ] Lupus anticoagulants                                      (3 Points)                                                           [ ] Anticardiolipin antibodies                              (3 Points)                                                       [ ] High homocysteine in the blood                   (3 Points)                                             [ ] Other congenital or acquired thrombophilia      (3 Points)                                                [ ] Heparin induced thrombocytopenia                  (3 Points)                                        MOBILITY RELATED FACTORS  [ ] Bed rest                                                         (1 Point)  [ ] Plaster cast                                                    (2 points)  [ ] Bed bound for more than 72 hours           (2 Points)    GENDER SPECIFIC FACTORS  [ ] Pregnancy or had a baby within the last month   (1 Point)  [ ] Post-partum < 6 weeks                                   (1 Point)  [ ] Hormonal therapy  or oral contraception   (1 Point)  [ ] History of pregnancy complications              (1 point)  [ ] Unexplained or recurrent              (1 Point)    OTHER RISK FACTORS                                           (1 Point)  [ ] BMI >40, smoking, diabetes requiring insulin, chemotherapy  blood transfusions and length of surgery over 2 hours    SURGERY RELATED RISK FACTORS  [ ]  Section within the last month     (1 Point)  [ ] Minor surgery                                                  (1 Point)  [ ] Arthroscopic surgery                                       (2 Points)  [ ] Planned major surgery lasting more            (2 Points)      than 45 minutes     [ ] Elective hip or knee joint replacement       (5 points)       surgery                                                TRAUMA RELATED RISK FACTORS  [ ] Fracture of the hip, pelvis, or leg                       (5 Points)  [ ] Spinal cord injury resulting in paralysis             (5 points)       (in the previous month)    [ ] Paralysis  (less than 1 month)                             (5 Points)  [ ] Multiple Trauma within 1 month                        (5 Points)    Total Score [        ]    Caprini Score 0-2: Low Risk, NO VTE prophylaxis required for most patients, encourage ambulation  Caprini Score 3-6: Moderate Risk , pharmacologic VTE prophylaxis is indicated for most patients (in the absence of contraindications)  Caprini Score Greater than or =7: High risk, pharmocologic VTE prophylaxis indicated for most patients (in the absence of contraindications)    OPIOID RISK TOOL    KARIS EACH BOX THAT APPLIES AND ADD TOTALS AT THE END    FAMILY HISTORY OF SUBSTANCE ABUSE                 FEMALE         MALE                                                Alcohol                             [  ]1 pt          [  ]3pts                                               Illegal Durgs                     [  ]2 pts        [  ]3pts                                               Rx Drugs                           [  ]4 pts        [  ]4 pts    PERSONAL HISTORY OF SUBSTANCE ABUSE                                                                                          Alcohol                             [  ]3 pts       [  ]3 pts                                               Illegal Drugs                     [  ]4 pts        [  ]4 pts                                               Rx Drugs                           [  ]5 pts        [  ]5 pts    AGE BETWEEN 16-45 YEARS                                      [  ]1 pt         [  ]1 pt    HISTORY OF PREADOLESCENT   SEXUAL ABUSE                                                             [  ]3 pts        [  ]0pts    PSYCHOLOGICAL DISEASE                     ADD, OCD, Bipolar, Schizophrenia        [  ]2 pts         [  ]2 pts                      Depression                                               [  ]1 pt           [  ]1 pt           SCORING TOTAL   (add numbers and type here)              (***)                                     A score of 3 or lower indicated LOW risk for future opioid abuse  A score of 4 to 7 indicated moderate risk for future opioid abuse  A score of 8 or higher indicates a high risk for opioid abuse     Patient educated on surgical scrub, ERP protocol, joint book, COVID testing scheduled for 2022, preadmission instructions, medical clearance and day of procedure medications, verbalizes understanding. Pt instructed to stop vitamins/supplements/herbal medications/ASA/NSAIDS for one week prior to surgery and discuss with PMD.    CAPRINI SCORE    AGE RELATED RISK FACTORS                                                             [ ] Age 41-60 years                                            (1 Point)  [ ] Age: 61-74 years                                           (2 Points)                 [ ] Age= 75 years                                                (3 Points)             DISEASE RELATED RISK FACTORS                                                       [ ] Edema in the lower extremities                 (1 Point)                     [ ] Varicose veins                                               (1 Point)                                 [ ] BMI > 25 Kg/m2                                            (1 Point)                                  [ ] Serious infection (ie PNA)                            (1 Point)                     [ ] Lung disease ( COPD, Emphysema)            (1 Point)                                                                          [ ] Acute myocardial infarction                         (1 Point)                  [ ] Congestive heart failure (in the previous month)  (1 Point)         [ ] Inflammatory bowel disease                            (1 Point)                  [ ] Central venous access, PICC or Port               (2 points)       (within the last month)                                                                [ ] Stroke (in the previous month)                        (5 Points)    [ ] Previous or present malignancy                       (2 points)                                                                                                                                                         HEMATOLOGY RELATED FACTORS                                                         [ ] Prior episodes of VTE                                     (3 Points)                     [ ] Positive family history for VTE                      (3 Points)                  [ ] Prothrombin 46243 A                                     (3 Points)                     [ ] Factor V Leiden                                                (3 Points)                        [ ] Lupus anticoagulants                                      (3 Points)                                                           [ ] Anticardiolipin antibodies                              (3 Points)                                                       [ ] High homocysteine in the blood                   (3 Points)                                             [ ] Other congenital or acquired thrombophilia      (3 Points)                                                [ ] Heparin induced thrombocytopenia                  (3 Points)                                        MOBILITY RELATED FACTORS  [ ] Bed rest                                                         (1 Point)  [ ] Plaster cast                                                    (2 points)  [ ] Bed bound for more than 72 hours           (2 Points)    GENDER SPECIFIC FACTORS  [ ] Pregnancy or had a baby within the last month   (1 Point)  [ ] Post-partum < 6 weeks                                   (1 Point)  [ ] Hormonal therapy  or oral contraception   (1 Point)  [ ] History of pregnancy complications              (1 point)  [ ] Unexplained or recurrent              (1 Point)    OTHER RISK FACTORS                                           (1 Point)  [ ] BMI >40, smoking, diabetes requiring insulin, chemotherapy  blood transfusions and length of surgery over 2 hours    SURGERY RELATED RISK FACTORS  [ ]  Section within the last month     (1 Point)  [ ] Minor surgery                                                  (1 Point)  [ ] Arthroscopic surgery                                       (2 Points)  [ ] Planned major surgery lasting more            (2 Points)      than 45 minutes     [ ] Elective hip or knee joint replacement       (5 points)       surgery                                                TRAUMA RELATED RISK FACTORS  [ ] Fracture of the hip, pelvis, or leg                       (5 Points)  [ ] Spinal cord injury resulting in paralysis             (5 points)       (in the previous month)    [ ] Paralysis  (less than 1 month)                             (5 Points)  [ ] Multiple Trauma within 1 month                        (5 Points)    Total Score [        ]    Caprini Score 0-2: Low Risk, NO VTE prophylaxis required for most patients, encourage ambulation  Caprini Score 3-6: Moderate Risk , pharmacologic VTE prophylaxis is indicated for most patients (in the absence of contraindications)  Caprini Score Greater than or =7: High risk, pharmocologic VTE prophylaxis indicated for most patients (in the absence of contraindications)    OPIOID RISK TOOL    KARIS EACH BOX THAT APPLIES AND ADD TOTALS AT THE END    FAMILY HISTORY OF SUBSTANCE ABUSE                 FEMALE         MALE                                                Alcohol                             [  ]1 pt          [  ]3pts                                               Illegal Durgs                     [  ]2 pts        [  ]3pts                                               Rx Drugs                           [  ]4 pts        [  ]4 pts    PERSONAL HISTORY OF SUBSTANCE ABUSE                                                                                          Alcohol                             [  ]3 pts       [  ]3 pts                                               Illegal Drugs                     [  ]4 pts        [  ]4 pts                                               Rx Drugs                           [  ]5 pts        [  ]5 pts    AGE BETWEEN 16-45 YEARS                                      [  ]1 pt         [  ]1 pt    HISTORY OF PREADOLESCENT   SEXUAL ABUSE                                                             [  ]3 pts        [  ]0pts    PSYCHOLOGICAL DISEASE                     ADD, OCD, Bipolar, Schizophrenia        [  ]2 pts         [  ]2 pts                      Depression                                               [  ]1 pt           [  ]1 pt           SCORING TOTAL   (add numbers and type here)              (***)                                     A score of 3 or lower indicated LOW risk for future opioid abuse  A score of 4 to 7 indicated moderate risk for future opioid abuse  A score of 8 or higher indicates a high risk for opioid abuse   51 y/o male presents to PST with complaints of right knee pain x 12 months that is getting progressively worse. States the pain is on the inside and outside of knee. Reports pain to be 7/10 at best, 9/10 at worst. States pain is worse bending pain, squatting, going up/down stairs, relieved with rest. Denies fevers, chills, nausea, vomiting or numbness/tingling down his leg. Patient is scheduled for right knee total joint replacement on 22 with Dr. Shoemaker. Patient educated on surgical scrub, ERP protocol, joint book, COVID testing scheduled for 2022, preadmission instructions, medical clearance and day of procedure medications, verbalizes understanding. Pt instructed to stop vitamins/supplements/herbal medications/ASA/NSAIDS for one week prior to surgery and discuss with PMD.    CAPRINI SCORE    AGE RELATED RISK FACTORS                                                             [x ] Age 41-60 years                                            (1 Point)  [ ] Age: 61-74 years                                           (2 Points)                 [ ] Age= 75 years                                                (3 Points)             DISEASE RELATED RISK FACTORS                                                       [ x] Edema in the lower extremities                 (1 Point)                     [ ] Varicose veins                                               (1 Point)                                 [x] BMI > 25 Kg/m2                                            (1 Point)                                  [ ] Serious infection (ie PNA)                            (1 Point)                     [ ] Lung disease ( COPD, Emphysema)            (1 Point)                                                                          [ ] Acute myocardial infarction                         (1 Point)                  [ ] Congestive heart failure (in the previous month)  (1 Point)         [ ] Inflammatory bowel disease                            (1 Point)                  [ ] Central venous access, PICC or Port               (2 points)       (within the last month)                                                                [ ] Stroke (in the previous month)                        (5 Points)    [ ] Previous or present malignancy                       (2 points)                                                                                                                                                         HEMATOLOGY RELATED FACTORS                                                         [ ] Prior episodes of VTE                                     (3 Points)                     [ ] Positive family history for VTE                      (3 Points)                  [ ] Prothrombin 65411 A                                     (3 Points)                     [ ] Factor V Leiden                                                (3 Points)                        [ ] Lupus anticoagulants                                      (3 Points)                                                           [ ] Anticardiolipin antibodies                              (3 Points)                                                       [ ] High homocysteine in the blood                   (3 Points)                                             [ ] Other congenital or acquired thrombophilia      (3 Points)                                                [ ] Heparin induced thrombocytopenia                  (3 Points)                                        MOBILITY RELATED FACTORS  [ ] Bed rest                                                         (1 Point)  [ ] Plaster cast                                                    (2 points)  [ ] Bed bound for more than 72 hours           (2 Points)    GENDER SPECIFIC FACTORS  [ ] Pregnancy or had a baby within the last month   (1 Point)  [ ] Post-partum < 6 weeks                                   (1 Point)  [ ] Hormonal therapy  or oral contraception   (1 Point)  [ ] History of pregnancy complications              (1 point)  [ ] Unexplained or recurrent              (1 Point)    OTHER RISK FACTORS                                           (1 Point)  [x ] BMI >40, smoking, diabetes requiring insulin, chemotherapy  blood transfusions and length of surgery over 2 hours    SURGERY RELATED RISK FACTORS  [ ]  Section within the last month     (1 Point)  [ ] Minor surgery                                                  (1 Point)  [ ] Arthroscopic surgery                                       (2 Points)  [ ] Planned major surgery lasting more            (2 Points)      than 45 minutes     [x ] Elective hip or knee joint replacement       (5 points)       surgery                                                TRAUMA RELATED RISK FACTORS  [ ] Fracture of the hip, pelvis, or leg                       (5 Points)  [ ] Spinal cord injury resulting in paralysis             (5 points)       (in the previous month)    [ ] Paralysis  (less than 1 month)                             (5 Points)  [ ] Multiple Trauma within 1 month                        (5 Points)    Total Score [   9     ]    Caprini Score 0-2: Low Risk, NO VTE prophylaxis required for most patients, encourage ambulation  Caprini Score 3-6: Moderate Risk , pharmacologic VTE prophylaxis is indicated for most patients (in the absence of contraindications)  Caprini Score Greater than or =7: High risk, pharmocologic VTE prophylaxis indicated for most patients (in the absence of contraindications)    OPIOID RISK TOOL    KARIS EACH BOX THAT APPLIES AND ADD TOTALS AT THE END    FAMILY HISTORY OF SUBSTANCE ABUSE                 FEMALE         MALE                                                Alcohol                             [  ]1 pt          [  ]3pts                                               Illegal Durgs                     [  ]2 pts        [  ]3pts                                               Rx Drugs                           [  ]4 pts        [  ]4 pts    PERSONAL HISTORY OF SUBSTANCE ABUSE                                                                                          Alcohol                             [  ]3 pts       [  ]3 pts                                               Illegal Drugs                     [  ]4 pts        [  ]4 pts                                               Rx Drugs                           [  ]5 pts        [  ]5 pts    AGE BETWEEN 16-45 YEARS                                      [  ]1 pt         [  ]1 pt    HISTORY OF PREADOLESCENT   SEXUAL ABUSE                                                             [  ]3 pts        [  ]0pts    PSYCHOLOGICAL DISEASE                     ADD, OCD, Bipolar, Schizophrenia        [  ]2 pts         [  ]2 pts                      Depression                                               [  ]1 pt           [ x ]1 pt           SCORING TOTAL   (add numbers and type here)              (*1**)                                     A score of 3 or lower indicated LOW risk for future opioid abuse  A score of 4 to 7 indicated moderate risk for future opioid abuse  A score of 8 or higher indicates a high risk for opioid abuse

## 2022-07-28 NOTE — H&P PST ADULT - MS GEN HX ROS MEA POS PC
'all joints"/arthritis/stiffness/neck pain 'all joints"/arthritis/joint pain/stiffness/neck pain/leg pain L/leg pain R

## 2022-07-28 NOTE — H&P PST ADULT - PROBLEM SELECTOR PLAN 1
medical clearance pending  Patient is scheduled for right knee total joint replacement on 8/16/22 with Dr. Shoemaker.

## 2022-07-28 NOTE — H&P PST ADULT - MUSCULOSKELETAL
details… ROM intact/no joint swelling/no joint erythema/no joint warmth no joint erythema/no joint warmth/no calf tenderness/decreased ROM due to pain/strength 5/5 bilateral upper extremities

## 2022-07-28 NOTE — H&P PST ADULT - HISTORY OF PRESENT ILLNESS
51 y/o male presents to PST with complaints of right knee pain.    51 y/o male presents to PST with complaints of right knee pain x 12 months that is getting progressively worse. States the pain is on the inside and outside of knee. Reports pain to be 7/10 at best, 9/10 at worst. States pain is worse bending pain, squatting, going up/down stairs, relieved with rest. Patient is scheduled for right knee total joint replacement on 8/16/22 with Dr. Shoemaker.      51 y/o male presents to PST with complaints of right knee pain x 12 months that is getting progressively worse. States the pain is on the inside and outside of knee. Reports pain to be 7/10 at best, 9/10 at worst. States pain is worse bending pain, squatting, going up/down stairs, relieved with rest. Denies fevers, chills, nausea, vomiting or numbness/tingling down his leg. Patient is scheduled for right knee total joint replacement on 8/16/22 with Dr. Shoemaker.

## 2022-07-29 LAB
MRSA PCR RESULT.: SIGNIFICANT CHANGE UP
S AUREUS DNA NOSE QL NAA+PROBE: DETECTED

## 2022-07-29 RX ORDER — MUPIROCIN 20 MG/G
1 OINTMENT TOPICAL
Qty: 10 | Refills: 0
Start: 2022-07-29 | End: 2022-08-02

## 2022-08-02 PROBLEM — N40.0 BENIGN PROSTATIC HYPERPLASIA WITHOUT LOWER URINARY TRACT SYMPTOMS: Chronic | Status: ACTIVE | Noted: 2022-07-28

## 2022-08-03 ENCOUNTER — APPOINTMENT (OUTPATIENT)
Dept: FAMILY MEDICINE | Facility: CLINIC | Age: 52
End: 2022-08-03

## 2022-08-03 VITALS
BODY MASS INDEX: 41.99 KG/M2 | OXYGEN SATURATION: 98 % | SYSTOLIC BLOOD PRESSURE: 98 MMHG | HEIGHT: 72 IN | DIASTOLIC BLOOD PRESSURE: 62 MMHG | HEART RATE: 84 BPM | RESPIRATION RATE: 16 BRPM | TEMPERATURE: 98 F | WEIGHT: 310 LBS

## 2022-08-03 DIAGNOSIS — Z01.818 ENCOUNTER FOR OTHER PREPROCEDURAL EXAMINATION: ICD-10-CM

## 2022-08-03 DIAGNOSIS — M25.561 PAIN IN RIGHT KNEE: ICD-10-CM

## 2022-08-03 PROCEDURE — 99214 OFFICE O/P EST MOD 30 MIN: CPT

## 2022-08-03 RX ORDER — METHYLPREDNISOLONE 4 MG/1
4 TABLET ORAL
Qty: 21 | Refills: 0 | Status: COMPLETED | COMMUNITY
Start: 2022-04-04 | End: 2022-08-03

## 2022-08-03 NOTE — HISTORY OF PRESENT ILLNESS
[No Pertinent Cardiac History] : no history of aortic stenosis, atrial fibrillation, coronary artery disease, recent myocardial infarction, or implantable device/pacemaker [Sleep Apnea] : sleep apnea [No Adverse Anesthesia Reaction] : no adverse anesthesia reaction in self or family member [(Patient denies any chest pain, claudication, dyspnea on exertion, orthopnea, palpitations or syncope)] : Patient denies any chest pain, claudication, dyspnea on exertion, orthopnea, palpitations or syncope [Asthma] : no asthma [COPD] : no COPD [Smoker] : not a smoker [Chronic Anticoagulation] : no chronic anticoagulation [Chronic Kidney Disease] : no chronic kidney disease [Diabetes] : no diabetes [FreeTextEntry1] : right knee replacement [FreeTextEntry2] : 8/16/2022 [FreeTextEntry3] : Dr martinez [FreeTextEntry4] : Mr. SERGIO FERRO is a 52 year old male presenting for pre op evaluation for right knee replacement\par Severe OA/ DJD, Has pain for many years. Also has Hx of Psoriatic arthritis. \par

## 2022-08-03 NOTE — PHYSICAL EXAM
[No Acute Distress] : no acute distress [Well-Appearing] : well-appearing [Normal Voice/Communication] : normal voice/communication [Normal Sclera/Conjunctiva] : normal sclera/conjunctiva [Normal TMs] : both tympanic membranes were normal [No Carotid Bruits] : no carotid bruits [Pedal Pulses Present] : the pedal pulses are present [No Edema] : there was no peripheral edema [No Extremity Clubbing/Cyanosis] : no extremity clubbing/cyanosis [Normal] : soft, non-tender, non-distended, no masses palpated, no HSM and normal bowel sounds [No CVA Tenderness] : no CVA  tenderness [No Rash] : no rash [No Focal Deficits] : no focal deficits [Normal Affect] : the affect was normal [Normal Insight/Judgement] : insight and judgment were intact [de-identified] : Depressed mood

## 2022-08-03 NOTE — ASSESSMENT
[High Risk Surgery - Intraperitoneal, Intrathoracic or Supringuinal Vascular Procedures] : High Risk Surgery - Intraperitoneal, Intrathoracic or Supringuinal Vascular Procedures - No (0) [Ischemic Heart Disease] : Ischemic Heart Disease - No (0) [Congestive Heart Failure] : Congestive Heart Failure - No (0) [Prior Cerebrovascular Accident or TIA] : Prior Cerebrovascular Accident or TIA - No (0) [Creatinine >= 2mg/dL (1 Point)] : Creatinine >= 2mg/dL - No (0) [Insulin-dependent Diabetic (1 Point)] : Insulin-dependent Diabetic - No (0) [0] : 0 , RCRI Class: I, Risk of Post-Op Cardiac Complications: 3.9%, 95% CI for Risk Estimate: 2.8% - 5.4% [Patient Optimized for Surgery] : Patient optimized for surgery [No Further Testing Recommended] : no further testing recommended [Continue medications as is] : Continue current medications [As per surgery] : as per surgery [FreeTextEntry4] : Mr. SERGIO FERRO is a 52 year old male presenting for pre op evaluation for right knee replacement\par  [FreeTextEntry7] : hold supplements/ vitamins 1 week prior.

## 2022-08-12 LAB
ALBUMIN SERPL ELPH-MCNC: 4.3 G/DL
ALP BLD-CCNC: 69 U/L
ALT SERPL-CCNC: 33 U/L
ANION GAP SERPL CALC-SCNC: 12 MMOL/L
AST SERPL-CCNC: 26 U/L
BASOPHILS # BLD AUTO: 0.04 K/UL
BASOPHILS NFR BLD AUTO: 0.5 %
BILIRUB SERPL-MCNC: 0.4 MG/DL
BUN SERPL-MCNC: 12 MG/DL
CALCIUM SERPL-MCNC: 10 MG/DL
CHLORIDE SERPL-SCNC: 98 MMOL/L
CO2 SERPL-SCNC: 30 MMOL/L
CREAT SERPL-MCNC: 0.98 MG/DL
CRP SERPL-MCNC: 10 MG/L
EGFR: 94 ML/MIN/1.73M2
EOSINOPHIL # BLD AUTO: 0.32 K/UL
EOSINOPHIL NFR BLD AUTO: 4.3 %
ERYTHROCYTE [SEDIMENTATION RATE] IN BLOOD BY WESTERGREN METHOD: 39 MM/HR
GLUCOSE SERPL-MCNC: 106 MG/DL
HCT VFR BLD CALC: 46.2 %
HGB BLD-MCNC: 15.5 G/DL
IMM GRANULOCYTES NFR BLD AUTO: 0.3 %
LYMPHOCYTES # BLD AUTO: 1.82 K/UL
LYMPHOCYTES NFR BLD AUTO: 24.4 %
MAN DIFF?: NORMAL
MCHC RBC-ENTMCNC: 31.6 PG
MCHC RBC-ENTMCNC: 33.5 GM/DL
MCV RBC AUTO: 94.3 FL
MONOCYTES # BLD AUTO: 0.45 K/UL
MONOCYTES NFR BLD AUTO: 6 %
NEUTROPHILS # BLD AUTO: 4.82 K/UL
NEUTROPHILS NFR BLD AUTO: 64.5 %
PLATELET # BLD AUTO: 316 K/UL
POTASSIUM SERPL-SCNC: 4.2 MMOL/L
PROT SERPL-MCNC: 7.3 G/DL
RBC # BLD: 4.9 M/UL
RBC # FLD: 13.6 %
SODIUM SERPL-SCNC: 140 MMOL/L
WBC # FLD AUTO: 7.47 K/UL

## 2022-08-15 ENCOUNTER — TRANSCRIPTION ENCOUNTER (OUTPATIENT)
Age: 52
End: 2022-08-15

## 2022-08-15 NOTE — PATIENT PROFILE ADULT - FALL HARM RISK - HARM RISK INTERVENTIONS
Assistance with ambulation/Assistance OOB with selected safe patient handling equipment/Communicate Risk of Fall with Harm to all staff/Discuss with provider need for PT consult/Monitor gait and stability/Provide patient with walking aids - walker, cane, crutches/Reinforce activity limits and safety measures with patient and family/Sit up slowly, dangle for a short time, stand at bedside before walking/Tailored Fall Risk Interventions/Use of alarms - bed, chair and/or voice tab/Visual Cue: Yellow wristband and red socks/Bed in lowest position, wheels locked, appropriate side rails in place/Call bell, personal items and telephone in reach/Instruct patient to call for assistance before getting out of bed or chair/Non-slip footwear when patient is out of bed/[place] to call system/Physically safe environment - no spills, clutter or unnecessary equipment/Purposeful Proactive Rounding/Room/bathroom lighting operational, light cord in reach

## 2022-08-16 ENCOUNTER — INPATIENT (INPATIENT)
Facility: HOSPITAL | Age: 52
LOS: 0 days | Discharge: HOME | DRG: 470 | End: 2022-08-17
Attending: ORTHOPAEDIC SURGERY | Admitting: ORTHOPAEDIC SURGERY
Payer: COMMERCIAL

## 2022-08-16 ENCOUNTER — APPOINTMENT (OUTPATIENT)
Dept: ORTHOPEDIC SURGERY | Facility: HOSPITAL | Age: 52
End: 2022-08-16

## 2022-08-16 ENCOUNTER — TRANSCRIPTION ENCOUNTER (OUTPATIENT)
Age: 52
End: 2022-08-16

## 2022-08-16 VITALS
DIASTOLIC BLOOD PRESSURE: 75 MMHG | RESPIRATION RATE: 16 BRPM | SYSTOLIC BLOOD PRESSURE: 146 MMHG | OXYGEN SATURATION: 98 % | HEIGHT: 72 IN | TEMPERATURE: 97 F | HEART RATE: 64 BPM | WEIGHT: 309.97 LBS

## 2022-08-16 DIAGNOSIS — Z98.890 OTHER SPECIFIED POSTPROCEDURAL STATES: Chronic | ICD-10-CM

## 2022-08-16 DIAGNOSIS — M17.11 UNILATERAL PRIMARY OSTEOARTHRITIS, RIGHT KNEE: ICD-10-CM

## 2022-08-16 DIAGNOSIS — Z96.652 PRESENCE OF LEFT ARTIFICIAL KNEE JOINT: ICD-10-CM

## 2022-08-16 LAB
GLUCOSE BLDC GLUCOMTR-MCNC: 109 MG/DL — HIGH (ref 70–99)
GLUCOSE BLDC GLUCOMTR-MCNC: 127 MG/DL — HIGH (ref 70–99)
GLUCOSE BLDC GLUCOMTR-MCNC: 179 MG/DL — HIGH (ref 70–99)

## 2022-08-16 PROCEDURE — 99222 1ST HOSP IP/OBS MODERATE 55: CPT

## 2022-08-16 PROCEDURE — 20985 CPTR-ASST DIR MS PX: CPT

## 2022-08-16 PROCEDURE — 73560 X-RAY EXAM OF KNEE 1 OR 2: CPT | Mod: 26,RT

## 2022-08-16 PROCEDURE — 27447 TOTAL KNEE ARTHROPLASTY: CPT | Mod: AS,RT

## 2022-08-16 PROCEDURE — 27447 TOTAL KNEE ARTHROPLASTY: CPT | Mod: RT

## 2022-08-16 DEVICE — SCREW PSN FEMALE 2.5X25MM: Type: IMPLANTABLE DEVICE | Status: FUNCTIONAL

## 2022-08-16 DEVICE — SURF ART PERSONA 10-12 GH RT 10MM: Type: IMPLANTABLE DEVICE | Status: FUNCTIONAL

## 2022-08-16 DEVICE — PIN HEADLESS TROCHAR 3.2X75MM: Type: IMPLANTABLE DEVICE | Status: FUNCTIONAL

## 2022-08-16 DEVICE — STEM EXT PERSONA 14MM PLUS 30M: Type: IMPLANTABLE DEVICE | Status: FUNCTIONAL

## 2022-08-16 DEVICE — STEM TIB PSN SZ 5 DEG G R: Type: IMPLANTABLE DEVICE | Status: FUNCTIONAL

## 2022-08-16 DEVICE — IMPLANTABLE DEVICE: Type: IMPLANTABLE DEVICE | Status: FUNCTIONAL

## 2022-08-16 DEVICE — CEMENT BONE PALACOS R W/O GENTAMICIN 1X40: Type: IMPLANTABLE DEVICE | Status: FUNCTIONAL

## 2022-08-16 DEVICE — PIN THREADED HEADED STRL: Type: IMPLANTABLE DEVICE | Status: FUNCTIONAL

## 2022-08-16 DEVICE — PATELLA PERSONA VE CEMENTED 35MM: Type: IMPLANTABLE DEVICE | Status: FUNCTIONAL

## 2022-08-16 DEVICE — SCREW HD HEX 3.5MM: Type: IMPLANTABLE DEVICE | Status: FUNCTIONAL

## 2022-08-16 RX ORDER — TAMSULOSIN HYDROCHLORIDE 0.4 MG/1
0.4 CAPSULE ORAL AT BEDTIME
Refills: 0 | Status: DISCONTINUED | OUTPATIENT
Start: 2022-08-16 | End: 2022-08-17

## 2022-08-16 RX ORDER — VANCOMYCIN HCL 1 G
2000 VIAL (EA) INTRAVENOUS ONCE
Refills: 0 | Status: COMPLETED | OUTPATIENT
Start: 2022-08-16 | End: 2022-08-16

## 2022-08-16 RX ORDER — SODIUM CHLORIDE 9 MG/ML
1000 INJECTION INTRAMUSCULAR; INTRAVENOUS; SUBCUTANEOUS
Refills: 0 | Status: DISCONTINUED | OUTPATIENT
Start: 2022-08-16 | End: 2022-08-17

## 2022-08-16 RX ORDER — SODIUM CHLORIDE 9 MG/ML
1000 INJECTION, SOLUTION INTRAVENOUS
Refills: 0 | Status: DISCONTINUED | OUTPATIENT
Start: 2022-08-16 | End: 2022-08-16

## 2022-08-16 RX ORDER — HYDROMORPHONE HYDROCHLORIDE 2 MG/ML
4 INJECTION INTRAMUSCULAR; INTRAVENOUS; SUBCUTANEOUS
Refills: 0 | Status: DISCONTINUED | OUTPATIENT
Start: 2022-08-16 | End: 2022-08-17

## 2022-08-16 RX ORDER — ACETAMINOPHEN 500 MG
1000 TABLET ORAL
Refills: 0 | Status: COMPLETED | OUTPATIENT
Start: 2022-08-16 | End: 2022-08-16

## 2022-08-16 RX ORDER — HYDROMORPHONE HYDROCHLORIDE 2 MG/ML
0.5 INJECTION INTRAMUSCULAR; INTRAVENOUS; SUBCUTANEOUS
Refills: 0 | Status: DISCONTINUED | OUTPATIENT
Start: 2022-08-16 | End: 2022-08-16

## 2022-08-16 RX ORDER — SOLIFENACIN SUCCINATE 10 MG/1
1 TABLET ORAL
Qty: 0 | Refills: 0 | DISCHARGE

## 2022-08-16 RX ORDER — ACETAMINOPHEN 500 MG
975 TABLET ORAL ONCE
Refills: 0 | Status: COMPLETED | OUTPATIENT
Start: 2022-08-16 | End: 2022-08-16

## 2022-08-16 RX ORDER — SENNA PLUS 8.6 MG/1
2 TABLET ORAL AT BEDTIME
Refills: 0 | Status: DISCONTINUED | OUTPATIENT
Start: 2022-08-16 | End: 2022-08-17

## 2022-08-16 RX ORDER — TAMSULOSIN HYDROCHLORIDE 0.4 MG/1
1 CAPSULE ORAL
Qty: 0 | Refills: 0 | DISCHARGE

## 2022-08-16 RX ORDER — TRANEXAMIC ACID 100 MG/ML
1000 INJECTION, SOLUTION INTRAVENOUS ONCE
Refills: 0 | Status: DISCONTINUED | OUTPATIENT
Start: 2022-08-16 | End: 2022-08-16

## 2022-08-16 RX ORDER — APREPITANT 80 MG/1
40 CAPSULE ORAL ONCE
Refills: 0 | Status: COMPLETED | OUTPATIENT
Start: 2022-08-16 | End: 2022-08-16

## 2022-08-16 RX ORDER — FOLIC ACID 0.8 MG
1 TABLET ORAL
Qty: 0 | Refills: 0 | DISCHARGE

## 2022-08-16 RX ORDER — OXYCODONE HYDROCHLORIDE 5 MG/1
10 TABLET ORAL
Refills: 0 | Status: DISCONTINUED | OUTPATIENT
Start: 2022-08-16 | End: 2022-08-17

## 2022-08-16 RX ORDER — CELECOXIB 200 MG/1
400 CAPSULE ORAL ONCE
Refills: 0 | Status: COMPLETED | OUTPATIENT
Start: 2022-08-16 | End: 2022-08-16

## 2022-08-16 RX ORDER — FENTANYL CITRATE 50 UG/ML
25 INJECTION INTRAVENOUS
Refills: 0 | Status: DISCONTINUED | OUTPATIENT
Start: 2022-08-16 | End: 2022-08-16

## 2022-08-16 RX ORDER — ONDANSETRON 8 MG/1
4 TABLET, FILM COATED ORAL ONCE
Refills: 0 | Status: DISCONTINUED | OUTPATIENT
Start: 2022-08-16 | End: 2022-08-16

## 2022-08-16 RX ORDER — LOSARTAN POTASSIUM 100 MG/1
1 TABLET, FILM COATED ORAL
Qty: 0 | Refills: 0 | DISCHARGE

## 2022-08-16 RX ORDER — PANTOPRAZOLE SODIUM 20 MG/1
40 TABLET, DELAYED RELEASE ORAL
Refills: 0 | Status: DISCONTINUED | OUTPATIENT
Start: 2022-08-16 | End: 2022-08-17

## 2022-08-16 RX ORDER — ONDANSETRON 8 MG/1
4 TABLET, FILM COATED ORAL ONCE
Refills: 0 | Status: COMPLETED | OUTPATIENT
Start: 2022-08-16 | End: 2022-08-16

## 2022-08-16 RX ORDER — GABAPENTIN 400 MG/1
1 CAPSULE ORAL
Qty: 0 | Refills: 0 | DISCHARGE

## 2022-08-16 RX ORDER — OXYCODONE HYDROCHLORIDE 5 MG/1
5 TABLET ORAL
Refills: 0 | Status: DISCONTINUED | OUTPATIENT
Start: 2022-08-16 | End: 2022-08-17

## 2022-08-16 RX ORDER — GENTAMICIN SULFATE 40 MG/ML
420 VIAL (ML) INJECTION ONCE
Refills: 0 | Status: DISCONTINUED | OUTPATIENT
Start: 2022-08-16 | End: 2022-08-16

## 2022-08-16 RX ORDER — POLYETHYLENE GLYCOL 3350 17 G/17G
17 POWDER, FOR SOLUTION ORAL AT BEDTIME
Refills: 0 | Status: DISCONTINUED | OUTPATIENT
Start: 2022-08-16 | End: 2022-08-17

## 2022-08-16 RX ORDER — VANCOMYCIN HCL 1 G
2000 VIAL (EA) INTRAVENOUS ONCE
Refills: 0 | Status: DISCONTINUED | OUTPATIENT
Start: 2022-08-16 | End: 2022-08-16

## 2022-08-16 RX ORDER — METHOTREXATE 2.5 MG/1
4 TABLET ORAL
Qty: 0 | Refills: 0 | DISCHARGE

## 2022-08-16 RX ORDER — GENTAMICIN SULFATE 40 MG/ML
310 VIAL (ML) INJECTION ONCE
Refills: 0 | Status: DISCONTINUED | OUTPATIENT
Start: 2022-08-16 | End: 2022-08-16

## 2022-08-16 RX ORDER — HYDROMORPHONE HYDROCHLORIDE 2 MG/ML
0.5 INJECTION INTRAMUSCULAR; INTRAVENOUS; SUBCUTANEOUS EVERY 4 HOURS
Refills: 0 | Status: DISCONTINUED | OUTPATIENT
Start: 2022-08-16 | End: 2022-08-17

## 2022-08-16 RX ORDER — DULOXETINE HYDROCHLORIDE 30 MG/1
1 CAPSULE, DELAYED RELEASE ORAL
Qty: 0 | Refills: 0 | DISCHARGE

## 2022-08-16 RX ORDER — CELECOXIB 200 MG/1
200 CAPSULE ORAL EVERY 12 HOURS
Refills: 0 | Status: DISCONTINUED | OUTPATIENT
Start: 2022-08-17 | End: 2022-08-17

## 2022-08-16 RX ORDER — VANCOMYCIN HCL 1 G
1000 VIAL (EA) INTRAVENOUS ONCE
Refills: 0 | Status: DISCONTINUED | OUTPATIENT
Start: 2022-08-16 | End: 2022-08-16

## 2022-08-16 RX ORDER — CEFAZOLIN SODIUM 1 G
3000 VIAL (EA) INJECTION ONCE
Refills: 0 | Status: DISCONTINUED | OUTPATIENT
Start: 2022-08-16 | End: 2022-08-16

## 2022-08-16 RX ORDER — ONDANSETRON 8 MG/1
4 TABLET, FILM COATED ORAL EVERY 6 HOURS
Refills: 0 | Status: DISCONTINUED | OUTPATIENT
Start: 2022-08-16 | End: 2022-08-17

## 2022-08-16 RX ORDER — ACETAMINOPHEN 500 MG
975 TABLET ORAL EVERY 8 HOURS
Refills: 0 | Status: DISCONTINUED | OUTPATIENT
Start: 2022-08-17 | End: 2022-08-17

## 2022-08-16 RX ORDER — LOSARTAN POTASSIUM 100 MG/1
50 TABLET, FILM COATED ORAL DAILY
Refills: 0 | Status: DISCONTINUED | OUTPATIENT
Start: 2022-08-18 | End: 2022-08-17

## 2022-08-16 RX ORDER — SODIUM CHLORIDE 9 MG/ML
500 INJECTION INTRAMUSCULAR; INTRAVENOUS; SUBCUTANEOUS ONCE
Refills: 0 | Status: COMPLETED | OUTPATIENT
Start: 2022-08-16 | End: 2022-08-16

## 2022-08-16 RX ORDER — MAGNESIUM HYDROXIDE 400 MG/1
30 TABLET, CHEWABLE ORAL DAILY
Refills: 0 | Status: DISCONTINUED | OUTPATIENT
Start: 2022-08-16 | End: 2022-08-17

## 2022-08-16 RX ORDER — SODIUM CHLORIDE 9 MG/ML
3 INJECTION INTRAMUSCULAR; INTRAVENOUS; SUBCUTANEOUS EVERY 8 HOURS
Refills: 0 | Status: DISCONTINUED | OUTPATIENT
Start: 2022-08-16 | End: 2022-08-16

## 2022-08-16 RX ORDER — ASPIRIN/CALCIUM CARB/MAGNESIUM 324 MG
325 TABLET ORAL
Refills: 0 | Status: DISCONTINUED | OUTPATIENT
Start: 2022-08-16 | End: 2022-08-17

## 2022-08-16 RX ORDER — KETOROLAC TROMETHAMINE 30 MG/ML
15 SYRINGE (ML) INJECTION EVERY 6 HOURS
Refills: 0 | Status: DISCONTINUED | OUTPATIENT
Start: 2022-08-16 | End: 2022-08-17

## 2022-08-16 RX ADMIN — Medication 325 MILLIGRAM(S): at 16:21

## 2022-08-16 RX ADMIN — SENNA PLUS 2 TABLET(S): 8.6 TABLET ORAL at 21:30

## 2022-08-16 RX ADMIN — Medication 15 MILLIGRAM(S): at 23:11

## 2022-08-16 RX ADMIN — Medication 1000 MILLIGRAM(S): at 18:19

## 2022-08-16 RX ADMIN — POLYETHYLENE GLYCOL 3350 17 GRAM(S): 17 POWDER, FOR SOLUTION ORAL at 21:30

## 2022-08-16 RX ADMIN — TAMSULOSIN HYDROCHLORIDE 0.4 MILLIGRAM(S): 0.4 CAPSULE ORAL at 21:30

## 2022-08-16 RX ADMIN — OXYCODONE HYDROCHLORIDE 10 MILLIGRAM(S): 5 TABLET ORAL at 15:04

## 2022-08-16 RX ADMIN — Medication 975 MILLIGRAM(S): at 07:40

## 2022-08-16 RX ADMIN — ONDANSETRON 4 MILLIGRAM(S): 8 TABLET, FILM COATED ORAL at 16:20

## 2022-08-16 RX ADMIN — HYDROMORPHONE HYDROCHLORIDE 0.5 MILLIGRAM(S): 2 INJECTION INTRAMUSCULAR; INTRAVENOUS; SUBCUTANEOUS at 20:15

## 2022-08-16 RX ADMIN — HYDROMORPHONE HYDROCHLORIDE 0.5 MILLIGRAM(S): 2 INJECTION INTRAMUSCULAR; INTRAVENOUS; SUBCUTANEOUS at 20:01

## 2022-08-16 RX ADMIN — OXYCODONE HYDROCHLORIDE 10 MILLIGRAM(S): 5 TABLET ORAL at 23:12

## 2022-08-16 RX ADMIN — ONDANSETRON 4 MILLIGRAM(S): 8 TABLET, FILM COATED ORAL at 20:41

## 2022-08-16 RX ADMIN — SODIUM CHLORIDE 500 MILLILITER(S): 9 INJECTION INTRAMUSCULAR; INTRAVENOUS; SUBCUTANEOUS at 13:48

## 2022-08-16 RX ADMIN — OXYCODONE HYDROCHLORIDE 10 MILLIGRAM(S): 5 TABLET ORAL at 17:49

## 2022-08-16 RX ADMIN — CELECOXIB 400 MILLIGRAM(S): 200 CAPSULE ORAL at 07:40

## 2022-08-16 RX ADMIN — Medication 15 MILLIGRAM(S): at 16:36

## 2022-08-16 RX ADMIN — Medication 15 MILLIGRAM(S): at 16:21

## 2022-08-16 RX ADMIN — Medication 250 MILLIGRAM(S): at 21:30

## 2022-08-16 RX ADMIN — APREPITANT 40 MILLIGRAM(S): 80 CAPSULE ORAL at 07:40

## 2022-08-16 RX ADMIN — OXYCODONE HYDROCHLORIDE 10 MILLIGRAM(S): 5 TABLET ORAL at 18:23

## 2022-08-16 RX ADMIN — Medication 400 MILLIGRAM(S): at 17:49

## 2022-08-16 RX ADMIN — Medication 230.77 MILLIGRAM(S): at 10:30

## 2022-08-16 NOTE — DISCHARGE NOTE PROVIDER - NSDCFUADDINST_GEN_ALL_CORE_FT
The patient will be seen in the office between 2-3 weeks for wound check.   **Your first post-operative visit has been scheduled prior to your admission. PLEASE CONTACT OFFICE TO CONFIRM THE APPOINTMENT DATE.   **  The silver based dressing is to be removed 7 days from the date of surgery. 8/23/2022  ** CONTACT THE OFFICE IF THE FOLLOWING DEVELOP:  - the dressing becomes soiled or saturated  - you develop a fever greater that 101F  - the wound becomes red or you develop blistering around the wound  * Patient may shower after post-op day #3. 8/19/2022  * The patient will continue home PT consistent with  total knee replacement protocol. Transition to outpatient PT will occur at the time of the first office visit.   * The patient will practice knee extension exercises regularly to minimize hamstring contraction.   * The patient is FULL weight bearing.  * The patient will continue ASPIRIN for 6 weeks after surgery for blood clot prevention. 9/27/2022  *** While on aspirin, the patient will take daily omeprazole or other similar medication to protect the stomach from irritation.   * The patient will take OXYCODONE AND TYLENOL for pain control and adjust according to prescription and patient needs. Contact the office if pain increases while taking prescribed pain medications or related concerns develop.  * Celebrex will be taken twice daily for 3 weeks for pain control and prevention of excessive bone growth. Additional prescription may be requested at your office follow-up visit.   * The patient will take Senna S while taking oxycodone to prevent narcotic associated constipation.  Additionally, increase water intake (drink at least 8 glasses of water daily) and try adding fiber to the diet by eating fruits, vegetables and foods that are rich in grains. If constipation is experienced, contact the medical/primary care provider to discuss further treatment options.  * To avoid injury at home:  - continue use of rolling walker until cleared by physical therapist  - have family or friend remove all throw rug or objects in hallways that may present a trip hazard.  - if you experience any dizziness or medical concerns, call your medical doctor or  911.  * The implant may activate metal detection devices.  The patient will be seen in the office between 2-3 weeks for wound check.   **Your first post-operative visit has been scheduled prior to your admission. PLEASE CONTACT OFFICE TO CONFIRM THE APPOINTMENT DATE.   **  The silver based dressing is to be removed 7 days from the date of surgery. 8/23/2022  ** CONTACT THE OFFICE IF THE FOLLOWING DEVELOP:  - the dressing becomes soiled or saturated  - you develop a fever greater that 101F  - the wound becomes red or you develop blistering around the wound  * Patient may shower after post-op day #3. 8/19/2022  * The patient will continue home PT consistent with  total knee replacement protocol. Transition to outpatient PT will occur at the time of the first office visit.   * The patient will practice knee extension exercises regularly to minimize hamstring contraction.   * The patient is FULL weight bearing.  * The patient will continue ASPIRIN for 6 weeks after surgery for blood clot prevention. 9/27/2022  *** While on aspirin, the patient will take daily omeprazole or other similar medication to protect the stomach from irritation.   * The patient will take tramadol AND TYLENOL for pain control and adjust according to prescription and patient needs. Contact the office if pain increases while taking prescribed pain medications or related concerns develop.  * Celebrex will be taken twice daily for 3 weeks for pain control and prevention of excessive bone growth. Additional prescription may be requested at your office follow-up visit.   * The patient will take Senna S while taking tramadol to prevent narcotic associated constipation.  Additionally, increase water intake (drink at least 8 glasses of water daily) and try adding fiber to the diet by eating fruits, vegetables and foods that are rich in grains. If constipation is experienced, contact the medical/primary care provider to discuss further treatment options.  * To avoid injury at home:  - continue use of rolling walker until cleared by physical therapist  - have family or friend remove all throw rug or objects in hallways that may present a trip hazard.  - if you experience any dizziness or medical concerns, call your medical doctor or  911.  * The implant may activate metal detection devices.

## 2022-08-16 NOTE — DISCHARGE NOTE PROVIDER - NSDCMRMEDTOKEN_GEN_ALL_CORE_FT
DULoxetine 30 mg oral delayed release capsule: 1 cap(s) orally once a day  folic acid 1 mg oral tablet: 1 tab(s) orally once a day  gabapentin 100 mg oral tablet: 1 tab(s) orally once a day  Humira 40 mg/0.8 mL subcutaneous kit: 1  subcutaneous once a week  losartan 50 mg oral tablet: 1 tab(s) orally once a day (in the morning)  methotrexate 2.5 mg oral tablet: 4 tab(s) orally once a week  mupirocin 2% topical ointment: Apply small amount to each nostril two times a day for 5 days leading up to procedure (8/11/22- 8/15/22) . The morning of Covid-19 PCR test apply after the test.  Relafen:   solifenacin 5 mg oral tablet: 1 tab(s) orally once a day  tamsulosin 0.4 mg oral capsule: 1 cap(s) orally once a day (in the morning)   acetaminophen 325 mg oral tablet: 3 tab(s) orally every 8 hours  aspirin 325 mg oral delayed release tablet: 1 tab(s) orally 2 times a day x 6 weeks  celecoxib 200 mg oral capsule: 1 cap(s) orally every 12 hours  DULoxetine 30 mg oral delayed release capsule: 1 cap(s) orally once a day  folic acid 1 mg oral tablet: 1 tab(s) orally once a day  gabapentin 100 mg oral tablet: 1 tab(s) orally once a day  losartan 50 mg oral tablet: 1 tab(s) orally once a day (in the morning)  methotrexate 2.5 mg oral tablet: 4 tab(s) orally once a week  omeprazole 40 mg oral delayed release capsule: 1 cap(s) orally once a day, before breakfast  Senna S 50 mg-8.6 mg oral tablet: 2 tab(s) orally once a day (at bedtime)   solifenacin 5 mg oral tablet: 1 tab(s) orally once a day  tamsulosin 0.4 mg oral capsule: 1 cap(s) orally once a day (in the morning)  traMADol 50 mg oral tablet: 1 tab(s) orally every 6 hours, As Needed MDD:4

## 2022-08-16 NOTE — DISCHARGE NOTE PROVIDER - HOSPITAL COURSE
The patient underwent a Right TOTAL KNEE REPLACEMENT on 8/16/2022. The patient received antibiotics consistent with SCIP guidelines. The patient underwent the procedure and had no intra-operative complications. Post-operatively, the patient was seen by medicine and PT. The patient received ASPIRIN for DVTP. The patient received pain medications per orthopedic pain management pathway and the pain was appropriately controlled. The patient did not have any post-operative medical complications. The patient was discharged in stable condition.

## 2022-08-16 NOTE — DISCHARGE NOTE PROVIDER - NSDCFUSCHEDAPPT_GEN_ALL_CORE_FT
Kevin Shoemaker  [facility]  ORTHOSURG [address]  Scheduled Appointment: 09/09/2022    Ely Roberto  [facility]  RHEUM [address]  Scheduled Appointment: 09/26/2022    Elebiary, Yeon J  [facility]  ORTHOSURG [address]  Scheduled Appointment: 10/04/2022    Real Souza  [facility]  [address] [address]  Scheduled Appointment: 10/26/2022    Kevin Shoemaker  [facility]  ORTHOSURG [address]  Scheduled Appointment: 11/07/2022
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
## 2022-08-16 NOTE — DISCHARGE NOTE PROVIDER - CARE PROVIDER_API CALL
Kevin Shoemaker)  Orthopaedic Surgery  [address] [facility] [address], [address]  [place] [address]  Phone: [phone]  Fax: [phone]  Follow Up Time:

## 2022-08-16 NOTE — CONSULT NOTE ADULT - SUBJECTIVE AND OBJECTIVE BOX
53 y/o BPH,  Psoriasis, right knee pain x 12 months that is getting progressively worse, she came in here for elective right knee total joint replacement on 8/16/22 with Dr. Shoemaker s/p procedure.      Allergies:  	penicillins: Drug Category, Swelling, Rash      · 	Humira 40 mg/0.8 mL subcutaneous kit: Last Dose Taken:  , 1  subcutaneous once a week  · 	methotrexate 2.5 mg oral tablet: 4 tab(s) orally once a week  · 	folic acid 1 mg oral tablet: Last Dose Taken:  , 1 tab(s) orally once a day  · 	solifenacin 5 mg oral tablet: 1 tab(s) orally once a day  · 	DULoxetine 30 mg oral delayed release capsule: Last Dose Taken:  , 1 cap(s) orally once a day  · 	gabapentin 100 mg oral tablet: Last Dose Taken:  , 1 tab(s) orally once a day  · 	tamsulosin 0.4 mg oral capsule: Last Dose Taken:  , 1 cap(s) orally once a day (in the morning)  · 	losartan 50 mg oral tablet: Last Dose Taken:  , 1 tab(s) orally once a day (in the morning)      PAST MEDICAL HISTORY:  Arthralgia of right knee     BPH (benign prostatic hyperplasia)     Deviated septum     Fatty liver     Flank pain Left    Hemorrhoids, unspecified hemorrhoid type     Hypertension, unspecified type     Kidney stone on left side     Nasal congestion     Obesity     Obstructive sleep apnea syndrome CPAP Machine    Psoriasis.     PAST SURGICAL HISTORY:  H/O arthroscopy of knee Right 2014    H/O cystoscopy 2016    S/P carpal tunnel release BIlateral 2015.     FAMILY HISTORY:  Mother  Still living? No  Family history of diabetes mellitus, Age at diagnosis: Age Unknown  Family history of renal failure, Age at diagnosis: Age Unknown.      Social History: not a smoker, drinker or using any drugs        53 y/o M with Hx BPH,  Psoriasis, right knee pain x 12 months that is getting progressively worse, she came in here for elective right knee total joint replacement on 8/16/22 with Dr. Shoemaker s/p procedure,  Patient tolerated procedure well, patient's pain is well controlled, he has no chest pain, sob, dizziness, fever, chills, nausea, vomiting.      REVIEW OF SYSTEMS:    CONSTITUTIONAL: No fever, some fatigue  RESPIRATORY: No cough, No shortness of breath  CARDIOVASCULAR: No chest pain, palpitations  GASTROINTESTINAL: No abdominal, No nausea, vomiting  NEUROLOGICAL: No headaches,  loss of strength.  MISCELLANEOUS: Right knee pain is well controlled      Allergies:  	penicillins: Drug Category, Swelling, Rash      · 	Humira 40 mg/0.8 mL subcutaneous kit: Last Dose Taken:  , 1  subcutaneous once a week  · 	methotrexate 2.5 mg oral tablet: 4 tab(s) orally once a week  · 	folic acid 1 mg oral tablet: Last Dose Taken:  , 1 tab(s) orally once a day  · 	solifenacin 5 mg oral tablet: 1 tab(s) orally once a day  · 	DULoxetine 30 mg oral delayed release capsule: Last Dose Taken:  , 1 cap(s) orally once a day  · 	gabapentin 100 mg oral tablet: Last Dose Taken:  , 1 tab(s) orally once a day  · 	tamsulosin 0.4 mg oral capsule: Last Dose Taken:  , 1 cap(s) orally once a day (in the morning)  · 	losartan 50 mg oral tablet: Last Dose Taken:  , 1 tab(s) orally once a day (in the morning)      PAST MEDICAL HISTORY:  Arthralgia of right knee     BPH (benign prostatic hyperplasia)     Deviated septum     Fatty liver     Flank pain Left    Hemorrhoids, unspecified hemorrhoid type     Hypertension, unspecified type     Kidney stone on left side     Nasal congestion     Obesity     Obstructive sleep apnea syndrome CPAP Machine    Psoriasis.     PAST SURGICAL HISTORY:  H/O arthroscopy of knee Right 2014    H/O cystoscopy 2016    S/P carpal tunnel release BIlateral 2015.     FAMILY HISTORY:  Mother  Still living? No  Family history of diabetes mellitus, Age at diagnosis: Age Unknown  Family history of renal failure, Age at diagnosis: Age Unknown.      Social History: not a smoker, drinker or using any drugs       Vital Signs Last 24 Hrs  T(C): 36.3 (16 Aug 2022 15:48), Max: 36.3 (16 Aug 2022 15:48)  T(F): 97.4 (16 Aug 2022 15:48), Max: 97.4 (16 Aug 2022 15:48)  HR: 58 (16 Aug 2022 15:48) (58 - 70)  BP: 113/71 (16 Aug 2022 15:48) (113/71 - 146/75)  BP(mean): 99 (16 Aug 2022 15:15) (72 - 99)  RR: 18 (16 Aug 2022 15:48) (15 - 18)  SpO2: 96% (16 Aug 2022 15:48) (94% - 99%)    Parameters below as of 16 Aug 2022 15:48  Patient On (Oxygen Delivery Method): room air        PHYSICAL EXAM:    GENERAL: middle age male looking comfortable   HEENT: PERRL, +EOMI  NECK: soft, Supple, No JVD,   CHEST/LUNG: Clear to auscultate bilaterally; No wheezing  HEART: S1S2+, Regular rate and rhythm; No murmurs  ABDOMEN: Soft, Nontender, Nondistended; Bowel sounds present  EXTREMITIES:  2+ Peripheral Pulses, No edema, right knee Joint area cover with dressing, no bleeding or soaking   SKIN: No rashes or lesions  NEURO: AAOX3, no focal deficits, no motor r sensory loss  PSYCH: normal mood

## 2022-08-16 NOTE — PHYSICAL THERAPY INITIAL EVALUATION ADULT - GENERAL OBSERVATIONS, REHAB EVAL
Pt received in bed, + IV, + b/l VCBs, breathing on RA in NAD, in 6/10 right knee pain, agreeable to PT evaluation

## 2022-08-16 NOTE — CONSULT NOTE ADULT - ASSESSMENT
51 y/o BPH,  Psoriasis, right knee pain x 12 months that is getting progressively worse, she came in here for elective right knee total joint replacement on 8/16/22 with Dr. Shoemaker s/p procedure.        · 	Humira 40 mg/0.8 mL subcutaneous kit: Last Dose Taken:  , 1  subcutaneous once a week  · 	methotrexate 2.5 mg oral tablet: 4 tab(s) orally once a week  · 	folic acid 1 mg oral tablet: Last Dose Taken:  , 1 tab(s) orally once a day  · 	solifenacin 5 mg oral tablet: 1 tab(s) orally once a day  · 	DULoxetine 30 mg oral delayed release capsule: Last Dose Taken:  , 1 cap(s) orally once a day  · 	gabapentin 100 mg oral tablet: Last Dose Taken:  , 1 tab(s) orally once a day  · 	tamsulosin 0.4 mg oral capsule: Last Dose Taken:  , 1 cap(s) orally once a day (in the morning)  · 	losartan 50 mg oral tablet: Last Dose Taken:  , 1 tab(s) orally once a day (in the morning) 53 y/o BPH,  Psoriasis, right knee pain x 12 months that is getting progressively worse, she came in here for elective right knee total joint replacement on 8/16/22 with Dr. Shoemaker s/p procedure.      Plan:   Right knee OA s/p TKR post op day 0:     - post op no complications  - VS stable  - abx per ortho   - c/w anti hypertensive to be restarted post op day 02 except if blood pressure goes >150 systolic   - c/w IVF x 24 hrs then reassess per ortho  - opiate induced constipation regimen   - encouraging incentive spirometry   -c/w local wound care per ortho   -DVT prophylaxis and Pain meds as per Ortho team   -PT/OT and weight bearing per ortho     Hx of Psoriatic arthritis: on Humira 40 mg/0.8 mL subcutaneous once a week, methotrexate 2.5 mg oral tablet: 4 tab(s) orally once a week, folic acid 1 mg oral tablet: Last Dose Taken:  , 1 tab(s) orally once a day, solifenacin 5 mg oral tablet: 1 tab(s) orally once a day,  gabapentin 100 mg 1 tab(s) orally once a day    Hx of Anxiety: will continue with DULoxetine 30 mg once a day    Hx of BPH: will continue with tamsulosin 0.4 mg once a day (in the morning)    Hx of HTN: will continue with losartan 50 mg once a day with holding parameters.

## 2022-08-16 NOTE — PHYSICAL THERAPY INITIAL EVALUATION ADULT - ADDITIONAL COMMENTS
Pt reports living in private home with wife who is able to assist. Pt has 2-3 steps to enter with no handrails and a flight of stairs inside with a handrail for ~half of the steps. Pt independent prior to admission. Owns  RW, may be able to borrow SAC from father in law. Pt drives & works.

## 2022-08-17 ENCOUNTER — TRANSCRIPTION ENCOUNTER (OUTPATIENT)
Age: 52
End: 2022-08-17

## 2022-08-17 VITALS
SYSTOLIC BLOOD PRESSURE: 117 MMHG | OXYGEN SATURATION: 98 % | DIASTOLIC BLOOD PRESSURE: 70 MMHG | HEART RATE: 69 BPM | TEMPERATURE: 98 F | RESPIRATION RATE: 18 BRPM

## 2022-08-17 LAB
ANION GAP SERPL CALC-SCNC: 11 MMOL/L — SIGNIFICANT CHANGE UP (ref 5–17)
BUN SERPL-MCNC: 12.5 MG/DL — SIGNIFICANT CHANGE UP (ref 8–20)
CALCIUM SERPL-MCNC: 8.3 MG/DL — LOW (ref 8.4–10.5)
CHLORIDE SERPL-SCNC: 98 MMOL/L — SIGNIFICANT CHANGE UP (ref 98–107)
CO2 SERPL-SCNC: 25 MMOL/L — SIGNIFICANT CHANGE UP (ref 22–29)
CREAT SERPL-MCNC: 0.88 MG/DL — SIGNIFICANT CHANGE UP (ref 0.5–1.3)
EGFR: 104 ML/MIN/1.73M2 — SIGNIFICANT CHANGE UP
GLUCOSE SERPL-MCNC: 133 MG/DL — HIGH (ref 70–99)
HCT VFR BLD CALC: 33.1 % — LOW (ref 39–50)
HGB BLD-MCNC: 11.6 G/DL — LOW (ref 13–17)
MCHC RBC-ENTMCNC: 31.4 PG — SIGNIFICANT CHANGE UP (ref 27–34)
MCHC RBC-ENTMCNC: 35 GM/DL — SIGNIFICANT CHANGE UP (ref 32–36)
MCV RBC AUTO: 89.7 FL — SIGNIFICANT CHANGE UP (ref 80–100)
PLATELET # BLD AUTO: 222 K/UL — SIGNIFICANT CHANGE UP (ref 150–400)
POTASSIUM SERPL-MCNC: 4.1 MMOL/L — SIGNIFICANT CHANGE UP (ref 3.5–5.3)
POTASSIUM SERPL-SCNC: 4.1 MMOL/L — SIGNIFICANT CHANGE UP (ref 3.5–5.3)
RBC # BLD: 3.69 M/UL — LOW (ref 4.2–5.8)
RBC # FLD: 12.4 % — SIGNIFICANT CHANGE UP (ref 10.3–14.5)
SODIUM SERPL-SCNC: 134 MMOL/L — LOW (ref 135–145)
WBC # BLD: 12.53 K/UL — HIGH (ref 3.8–10.5)
WBC # FLD AUTO: 12.53 K/UL — HIGH (ref 3.8–10.5)

## 2022-08-17 PROCEDURE — 73560 X-RAY EXAM OF KNEE 1 OR 2: CPT

## 2022-08-17 PROCEDURE — 80048 BASIC METABOLIC PNL TOTAL CA: CPT

## 2022-08-17 PROCEDURE — 99232 SBSQ HOSP IP/OBS MODERATE 35: CPT

## 2022-08-17 PROCEDURE — C1713: CPT

## 2022-08-17 PROCEDURE — 85027 COMPLETE CBC AUTOMATED: CPT

## 2022-08-17 PROCEDURE — 36415 COLL VENOUS BLD VENIPUNCTURE: CPT

## 2022-08-17 PROCEDURE — 82962 GLUCOSE BLOOD TEST: CPT

## 2022-08-17 PROCEDURE — C9399: CPT

## 2022-08-17 PROCEDURE — C1776: CPT

## 2022-08-17 RX ORDER — CELECOXIB 200 MG/1
1 CAPSULE ORAL
Qty: 42 | Refills: 0
Start: 2022-08-17 | End: 2022-09-06

## 2022-08-17 RX ORDER — METOCLOPRAMIDE HCL 10 MG
10 TABLET ORAL ONCE
Refills: 0 | Status: COMPLETED | OUTPATIENT
Start: 2022-08-17 | End: 2022-08-17

## 2022-08-17 RX ORDER — SENNOSIDES/DOCUSATE SODIUM 8.6MG-50MG
2 TABLET ORAL
Qty: 20 | Refills: 0
Start: 2022-08-17

## 2022-08-17 RX ORDER — ADALIMUMAB 40MG/0.8ML
1 KIT SUBCUTANEOUS
Qty: 0 | Refills: 0 | DISCHARGE

## 2022-08-17 RX ORDER — NABUMETONE 750 MG
0 TABLET ORAL
Qty: 0 | Refills: 0 | DISCHARGE

## 2022-08-17 RX ORDER — ASPIRIN/CALCIUM CARB/MAGNESIUM 324 MG
1 TABLET ORAL
Qty: 84 | Refills: 0
Start: 2022-08-17

## 2022-08-17 RX ORDER — OMEPRAZOLE 10 MG/1
1 CAPSULE, DELAYED RELEASE ORAL
Qty: 45 | Refills: 0
Start: 2022-08-17 | End: 2022-09-30

## 2022-08-17 RX ORDER — ACETAMINOPHEN 500 MG
3 TABLET ORAL
Qty: 0 | Refills: 0 | DISCHARGE
Start: 2022-08-17

## 2022-08-17 RX ORDER — TRAMADOL HYDROCHLORIDE 50 MG/1
100 TABLET ORAL ONCE
Refills: 0 | Status: DISCONTINUED | OUTPATIENT
Start: 2022-08-17 | End: 2022-08-17

## 2022-08-17 RX ORDER — TRAMADOL HYDROCHLORIDE 50 MG/1
100 TABLET ORAL EVERY 6 HOURS
Refills: 0 | Status: DISCONTINUED | OUTPATIENT
Start: 2022-08-17 | End: 2022-08-17

## 2022-08-17 RX ORDER — TRAMADOL HYDROCHLORIDE 50 MG/1
1 TABLET ORAL
Qty: 28 | Refills: 0
Start: 2022-08-17 | End: 2022-08-23

## 2022-08-17 RX ORDER — TRAMADOL HYDROCHLORIDE 50 MG/1
50 TABLET ORAL EVERY 4 HOURS
Refills: 0 | Status: DISCONTINUED | OUTPATIENT
Start: 2022-08-17 | End: 2022-08-17

## 2022-08-17 RX ORDER — TRAMADOL HYDROCHLORIDE 50 MG/1
25 TABLET ORAL EVERY 4 HOURS
Refills: 0 | Status: DISCONTINUED | OUTPATIENT
Start: 2022-08-17 | End: 2022-08-17

## 2022-08-17 RX ADMIN — Medication 975 MILLIGRAM(S): at 12:22

## 2022-08-17 RX ADMIN — OXYCODONE HYDROCHLORIDE 10 MILLIGRAM(S): 5 TABLET ORAL at 00:00

## 2022-08-17 RX ADMIN — Medication 325 MILLIGRAM(S): at 05:43

## 2022-08-17 RX ADMIN — Medication 15 MILLIGRAM(S): at 11:22

## 2022-08-17 RX ADMIN — Medication 15 MILLIGRAM(S): at 05:43

## 2022-08-17 RX ADMIN — Medication 975 MILLIGRAM(S): at 11:22

## 2022-08-17 RX ADMIN — TRAMADOL HYDROCHLORIDE 100 MILLIGRAM(S): 50 TABLET ORAL at 05:43

## 2022-08-17 RX ADMIN — PANTOPRAZOLE SODIUM 40 MILLIGRAM(S): 20 TABLET, DELAYED RELEASE ORAL at 05:43

## 2022-08-17 RX ADMIN — Medication 975 MILLIGRAM(S): at 05:43

## 2022-08-17 RX ADMIN — Medication 10 MILLIGRAM(S): at 09:32

## 2022-08-17 RX ADMIN — ONDANSETRON 4 MILLIGRAM(S): 8 TABLET, FILM COATED ORAL at 05:42

## 2022-08-17 RX ADMIN — Medication 15 MILLIGRAM(S): at 11:55

## 2022-08-17 RX ADMIN — TRAMADOL HYDROCHLORIDE 100 MILLIGRAM(S): 50 TABLET ORAL at 04:47

## 2022-08-17 RX ADMIN — Medication 15 MILLIGRAM(S): at 05:42

## 2022-08-17 RX ADMIN — Medication 15 MILLIGRAM(S): at 00:00

## 2022-08-17 RX ADMIN — Medication 1 TABLET(S): at 11:22

## 2022-08-17 NOTE — OCCUPATIONAL THERAPY INITIAL EVALUATION ADULT - PERTINENT HX OF CURRENT PROBLEM, REHAB EVAL
53 y/o male presents to PST with complaints of right knee pain x 12 months that is getting progressively worse. States the pain is on the inside and outside of knee. Reports pain to be 7/10 at best, 9/10 at worst. States pain is worse bending pain, squatting, going up/down stairs, relieved with rest. Denies fevers, chills, nausea, vomiting or numbness/tingling down his leg.

## 2022-08-17 NOTE — OCCUPATIONAL THERAPY INITIAL EVALUATION ADULT - ADDITIONAL COMMENTS
right handed  pt has a shower chair, rolling walker-> can access a cane from a family member if needed.  has lazy boy chair which can assist with sit to stand transfers

## 2022-08-17 NOTE — OCCUPATIONAL THERAPY INITIAL EVALUATION ADULT - LIVES WITH, PROFILE
Niece. Wife will be able to assist upon discharge. Pt lives in a townhouse, bed/bath upstairs but can stay on main level if needed.  Shower upstairs only; bathtub with curtains, grab bars in shower and toilet./other relative/spouse

## 2022-08-17 NOTE — DISCHARGE NOTE NURSING/CASE MANAGEMENT/SOCIAL WORK - NSDCPEFALRISK_GEN_ALL_CORE
For information on Fall & Injury Prevention, visit: [email or web address] OR  [email or web address] OR  https://www.cdc.gov/steadi/patient.html

## 2022-08-17 NOTE — PROGRESS NOTE ADULT - SUBJECTIVE AND OBJECTIVE BOX
Patient seen and examined at bedside. comfortable in bed, pain controlled. Denies fever/chills, SOB/chest pain, abdominal pain. Admits to some tingling b/l feet/toes, however states improving since surgery. Patient had spinal pre op and then was converted to general. No other complaints.    Vital Signs Last 24 Hrs  T(C): 36.3 (16 Aug 2022 15:48), Max: 36.3 (16 Aug 2022 15:48)  T(F): 97.4 (16 Aug 2022 15:48), Max: 97.4 (16 Aug 2022 15:48)  HR: 58 (16 Aug 2022 15:48) (58 - 70)  BP: 113/71 (16 Aug 2022 15:48) (113/71 - 146/75)  BP(mean): 99 (16 Aug 2022 15:15) (72 - 99)  RR: 18 (16 Aug 2022 15:48) (15 - 18)  SpO2: 96% (16 Aug 2022 15:48) (94% - 99%)    Parameters below as of 16 Aug 2022 15:48  Patient On (Oxygen Delivery Method): room air    RLE: Ace bandage dressing C/D/I. Patient reports sensation in tact to light touch distally. + strong dorsi/plantarflexion. DP 2+. calf soft NT B/L.    A/P: 52 y.o M s/p right TKA POD #0  - WBAT  - DVTP  - pain control        
Patient seen and examined at bedside. comfortable in bed, pain controlled. Denies fever/chills, SOB/chest pain, abdominal pain, numbness/tingling. Patient states had some nausea/vomiting with oxycodone/dilaudid, switched to tramadol and patient states feeling much better. no other complaints.    Vital Signs Last 24 Hrs  T(C): 36.7 (17 Aug 2022 04:08), Max: 36.7 (17 Aug 2022 04:08)  T(F): 98.1 (17 Aug 2022 04:08), Max: 98.1 (17 Aug 2022 04:08)  HR: 61 (17 Aug 2022 04:08) (55 - 77)  BP: 118/71 (17 Aug 2022 04:08) (113/71 - 139/77)  BP(mean): 99 (16 Aug 2022 15:15) (72 - 99)  RR: 19 (17 Aug 2022 04:08) (15 - 19)  SpO2: 95% (17 Aug 2022 04:08) (94% - 99%)    Parameters below as of 17 Aug 2022 04:08  Patient On (Oxygen Delivery Method): room air    RLE: Ace bandage dressing C/D/I. SILT. + dorsi/plantarflexion. DP 2+, ext warm cap refill brisk. calf soft NT B/L.                          11.6   12.53 )-----------( 222      ( 17 Aug 2022 05:18 )             33.1     A/P: 52 y.o M s/p right TKA POD #1  - WBAT  - DVTP  - d/c planning - home today if cleared by PT and medicine
SERGIO FERRO    [id number]    52y      Male    Patient is a 52y old  Male who presents with a chief complaint of Right total knee arthroplasty  (16 Aug 2022 14:43)      INTERVAL HPI/OVERNIGHT EVENTS:    Patient is doing ok, pain is well managed with pain medications, working well with PT, has no fever, chills, chest pain, SOB, nausea, vomiting, constipation.     REVIEW OF SYSTEMS:    CONSTITUTIONAL: No fever, fatigue  RESPIRATORY: No cough, No shortness of breath  CARDIOVASCULAR: No chest pain, palpitations  GASTROINTESTINAL: No abdominal, No nausea, vomiting  NEUROLOGICAL: No headaches,  loss of strength.  MISCELLANEOUS: right knee pain is well controlled       Vital Signs Last 24 Hrs  T(C): 36.7 (17 Aug 2022 04:08), Max: 36.7 (17 Aug 2022 04:08)  T(F): 98.1 (17 Aug 2022 04:08), Max: 98.1 (17 Aug 2022 04:08)  HR: 61 (17 Aug 2022 04:08) (55 - 77)  BP: 118/71 (17 Aug 2022 04:08) (113/71 - 139/77)  BP(mean): 99 (16 Aug 2022 15:15) (72 - 99)  RR: 19 (17 Aug 2022 04:08) (15 - 19)  SpO2: 95% (17 Aug 2022 04:08) (94% - 99%)    Parameters below as of 17 Aug 2022 04:08  Patient On (Oxygen Delivery Method): room air        PHYSICAL EXAM:    GENERAL: middle age male looking comfortable   HEENT: PERRL, +EOMI  NECK: soft, Supple, No JVD,   CHEST/LUNG: Clear to auscultate bilaterally; No wheezing  HEART: S1S2+, Regular rate and rhythm; No murmurs  ABDOMEN: Soft, Nontender, Nondistended; Bowel sounds present  EXTREMITIES:  2+ Peripheral Pulses, No edema, right knee Joint area cover with dressing, no bleeding or soaking   SKIN: No rashes or lesions  NEURO: AAOX3, no focal deficits, no motor r sensory loss  PSYCH: normal mood        LABS:                        11.6   12.53 )-----------( 222      ( 17 Aug 2022 05:18 )             33.1     08-17    134<L>  |  98  |  12.5  ----------------------------<  133<H>  4.1   |  25.0  |  0.88    Ca    8.3<L>      17 Aug 2022 05:18              I&O's Summary    16 Aug 2022 07:01  -  17 Aug 2022 07:00  --------------------------------------------------------  IN: 3580 mL / OUT: 1450 mL / NET: 2130 mL        MEDICATIONS  (STANDING):  acetaminophen     Tablet .. 975 milliGRAM(s) Oral every 8 hours  aspirin 325 milliGRAM(s) Oral two times a day  celecoxib 200 milliGRAM(s) Oral every 12 hours  ketorolac   Injectable 15 milliGRAM(s) IV Push every 6 hours  multivitamin 1 Tablet(s) Oral daily  pantoprazole    Tablet 40 milliGRAM(s) Oral before breakfast  polyethylene glycol 3350 17 Gram(s) Oral at bedtime  senna 2 Tablet(s) Oral at bedtime  sodium chloride 0.9%. 1000 milliLiter(s) (150 mL/Hr) IV Continuous <Continuous>  tamsulosin 0.4 milliGRAM(s) Oral at bedtime    MEDICATIONS  (PRN):  aluminum hydroxide/magnesium hydroxide/simethicone Suspension 30 milliLiter(s) Oral four times a day PRN Indigestion  HYDROmorphone  Injectable 0.5 milliGRAM(s) IV Push every 4 hours PRN breakthrough pain  magnesium hydroxide Suspension 30 milliLiter(s) Oral daily PRN Constipation  ondansetron Injectable 4 milliGRAM(s) IV Push every 6 hours PRN Nausea and/or Vomiting  traMADol 25 milliGRAM(s) Oral every 4 hours PRN Mild Pain (1 - 3)  traMADol 50 milliGRAM(s) Oral every 4 hours PRN Moderate Pain (4 - 6)  traMADol 100 milliGRAM(s) Oral every 6 hours PRN Severe Pain (7 - 10)

## 2022-08-17 NOTE — DISCHARGE NOTE NURSING/CASE MANAGEMENT/SOCIAL WORK - PATIENT PORTAL LINK FT
You can access the FollowMyHealth Patient Portal offered by [facility] by registering at the following website: [email or web address]. By joining [email or web address]’s FollowMyHealth portal, you will also be able to view your health information using other applications (apps) compatible with our system.

## 2022-08-17 NOTE — PROGRESS NOTE ADULT - ASSESSMENT
53 y/o BPH,  Psoriasis, right knee pain x 12 months that is getting progressively worse, she came in here for elective right knee total joint replacement on 8/16/22 with Dr. Shoemaker s/p procedure.      Plan:   Right knee OA s/p TKR post op day 01:     - post op no complications  - VS stable  - abx per ortho   - opiate induced constipation regimen   - encouraging incentive spirometry   -c/w local wound care per ortho   -DVT prophylaxis and Pain meds as per Ortho team   -PT/OT and weight bearing per ortho     Hx of Psoriatic arthritis: on Humira 40 mg/0.8 mL subcutaneous once a week, methotrexate 2.5 mg oral tablet: 4 tab(s) orally once a week, folic acid 1 mg oral tablet: Last Dose Taken:  , 1 tab(s) orally once a day, solifenacin 5 mg oral tablet: 1 tab(s) orally once a day,  gabapentin 100 mg 1 tab(s) orally once a day.     Hx of Anxiety: will continue with DULoxetine 30 mg once a day    Hx of BPH: will continue with tamsulosin 0.4 mg once a day (in the morning)    Hx of HTN: will continue with losartan 50 mg once a day with holding parameters.     Leucocytosis: Likely post op, no focus of infection, no fever, no chills.      51 y/o BPH,  Psoriasis, right knee pain x 12 months that is getting progressively worse, she came in here for elective right knee total joint replacement on 8/16/22 with Dr. Shoemaker s/p procedure.      Plan:   Right knee OA s/p TKR post op day 01:     - post op no complications  - VS stable  - abx per ortho   - opiate induced constipation regimen   - encouraging incentive spirometry   -c/w local wound care per ortho   -DVT prophylaxis and Pain meds as per Ortho team   -PT/OT and weight bearing per ortho     Hx of Psoriatic arthritis: on Humira 40 mg/0.8 mL subcutaneous once a week, methotrexate 2.5 mg oral tablet: 4 tab(s) orally once a week, folic acid 1 mg oral tablet: Last Dose Taken:  , 1 tab(s) orally once a day, solifenacin 5 mg oral tablet: 1 tab(s) orally once a day,  gabapentin 100 mg 1 tab(s) orally once a day.     Hx of Anxiety: will continue with DULoxetine 30 mg once a day    Hx of BPH: will continue with tamsulosin 0.4 mg once a day (in the morning)    Hx of HTN: will continue with losartan 50 mg once a day with holding parameters.     Leucocytosis: Likely post op, no focus of infection, no fever, no chills.     Patient is stable from the medicine point of view for discharge pending PT and Ortho eval.

## 2022-08-17 NOTE — OCCUPATIONAL THERAPY INITIAL EVALUATION ADULT - LEVEL OF INDEPENDENCE: DRESS LOWER BODY, OT EVAL
pants, slip-on shoes. no socks. Pt educated in dressing operated LE first with undergarment and pants./independent

## 2022-08-18 ENCOUNTER — NON-APPOINTMENT (OUTPATIENT)
Age: 52
End: 2022-08-18

## 2022-08-24 ENCOUNTER — RX RENEWAL (OUTPATIENT)
Age: 52
End: 2022-08-24

## 2022-08-28 PROBLEM — M19.90 INFLAMMATORY ARTHRITIS: Status: ACTIVE | Noted: 2021-06-28

## 2022-08-28 PROBLEM — G89.29 ENCOUNTER FOR CHRONIC PAIN MANAGEMENT: Status: ACTIVE | Noted: 2022-08-28

## 2022-08-28 PROBLEM — M25.50 ARTHRALGIA OF MULTIPLE SITES: Status: ACTIVE | Noted: 2021-04-20

## 2022-08-28 PROBLEM — K21.9 GERD (GASTROESOPHAGEAL REFLUX DISEASE): Status: ACTIVE | Noted: 2021-11-08

## 2022-08-28 PROBLEM — K76.0 HEPATIC STEATOSIS: Status: ACTIVE | Noted: 2022-08-28

## 2022-08-28 NOTE — PHYSICAL EXAM
[General Appearance - Well Nourished] : well nourished [General Appearance - Well Developed] : well developed [Sclera] : the sclera and conjunctiva were normal [Hearing Threshold Finger Rub Not [place]] : hearing was normal [Nail Clubbing] : no clubbing  or cyanosis of the fingernails [Motor Tone] : muscle strength and tone were normal [FreeTextEntry1] : pain over posterior ankle at the achilles tendon, mild swelling noted, allows plantar and dorsiflexion, ankle normal ROM, no sauage digits, elbows nontender, shoulder FROM,  [] : no rash [Skin Lesions] : no skin lesions [Affect] : the affect was normal [Mood] : the mood was normal

## 2022-08-28 NOTE — ASSESSMENT
[FreeTextEntry1] : 51y M with PsA, OA, CTS, cervical radiculopathy, and other types of chronic pain not able to use NSAIDs/APAP due to hepatic steatosis, GERD, and elevated LFTs. Pt currently on DMARDs and biologics for PsA w/ residual pain and inflammatory arthritis\par He would benefit from use of medical cannabis for alleviation of chronic pain and prevention/reduction of opioid use for chronic pain. CBD and THC have shown some mild anti-infalmmatory properies which may alleviate some inflammatory arthritis, however it has not been shown to reduce bony erosions or chronic inflammatory arthritis damage, but only in the alleviation of symptoms. He was recommended to continue on his DMARDs and biologics for PSA and continue to follow up w/ his rheumatologist.\par \par - c/w MTX\par - c/w Humira\par - start 20C:1T po/sl tincture qd to BID for chronic pain. May start THC only or 20T:1C sativa products for QHS use in tablet po formulation as needed for nighttime pain/sleep.  I do not suggest vaping unless pt with severe bouts of acute pain. Certificate has been mailed to patient and instructed on registration process and dispensaries.\par \par RTO yearly for certificate renewa.\par

## 2022-08-28 NOTE — HISTORY OF PRESENT ILLNESS
[___ Week(s) Ago] : [unfilled] week(s) ago [FreeTextEntry1] : Pt here for evaluation of medical cannabis use\par \par Pain: chronic pain from PSA and OA resulting in chronic joint pain, stiffness, swelling; cervical radiculopathy; renal colic\par Prior medication: RA/PSA DMARDs, NSAIDs\par Substance abuse history: none\par No prior opioid chronic use\par

## 2022-08-29 PROBLEM — Z96.652 STATUS POST LEFT KNEE REPLACEMENT: Status: ACTIVE | Noted: 2022-08-29

## 2022-09-09 ENCOUNTER — APPOINTMENT (OUTPATIENT)
Dept: ORTHOPEDIC SURGERY | Facility: CLINIC | Age: 52
End: 2022-09-09

## 2022-09-09 VITALS
SYSTOLIC BLOOD PRESSURE: 143 MMHG | DIASTOLIC BLOOD PRESSURE: 82 MMHG | WEIGHT: 310 LBS | BODY MASS INDEX: 41.99 KG/M2 | HEIGHT: 72 IN | HEART RATE: 73 BPM

## 2022-09-09 PROCEDURE — 99024 POSTOP FOLLOW-UP VISIT: CPT

## 2022-09-09 PROCEDURE — 73562 X-RAY EXAM OF KNEE 3: CPT | Mod: RT

## 2022-09-09 NOTE — HISTORY OF PRESENT ILLNESS
[1] : the patient reports pain that is 1/10 in severity [Healed] : healed [Neuro Intact] : an unremarkable neurological exam [Vascular Intact] : ~T peripheral vascular exam normal [Negative Laurita's] : maneuvers demonstrated a negative Laurita's sign [Xray (Date:___)] : [unfilled] Xray -  [Doing Well] : is doing well [No Sign of Infection] : is showing no signs of infection [Adequate Pain Control] : has adequate pain control [Chills] : no chills [Constipation] : no constipation [Diarrhea] : no diarrhea [Dysuria] : no dysuria [Fever] : no fever [Nausea] : no nausea [Vomiting] : no vomiting [Erythema] : not erythematous [Discharge] : absent of discharge [Swelling] : not swollen [Dehiscence] : not dehisced [de-identified] : s/p Right total knee arthroplasty.\par  Computerassisted tibial resection, [facility] procedure DOS:08/16/22\par  [de-identified] : pt is 3 weeks from right TKA\par pt is ready to start out pt PT\par \par his pain is controlled with Celebrex and tramadol she would like a refill patient is ambulating with a single-point cane he asks if the right knee clicking is normal .  [de-identified] : Examination of right knee demonstrate well-healed incision. Smooth painless range of motion of 0-90 degree\par \par  [de-identified] : 3 view X-ray of right knee demonstrate implants are in good alignment. No evidence of subsidence or loosening or wear  [de-identified] : Patient will continue with exercise and  start outpt PT \par rx is provided ,may drive when he is able to D/C tramadol. clicking is normal with TKA\par . 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 f/u in 1 month.\par

## 2022-09-24 NOTE — PROCEDURE
[de-identified] : I injected the patient's right knee today with cortisone.\par \par I discussed at length with the patient the planned steroid and lidocaine injection. The risks, benefits, convalescence and alternatives were reviewed. The possible side effects discussed included but were not limited to: pain, swelling, heat, bleeding, and redness. Symptoms are generally mild but if they are extensive then contact the office. Giving pain relievers by mouth such as NSAIDs or Tylenol can generally treat the reactions to steroid and lidocaine. Rare cases of infection have been noted. Rash, hives and itching may occur post injection. If you have muscle pain or cramps, flushing and or swelling of the face, rapid heart beat, nausea, dizziness, fever, chills, headache, difficulty breathing, swelling in the arms or legs, or have a prickly feeling of your skin, contact a health care provider immediately. Following this discussion, the knee was prepped with Alcohol and under sterile condition the 80 mg Depo-Medrol and 6 cc Lidocaine injection was performed with a 20 gauge needle through a superolateral injection site. The needle was introduced into the joint, aspiration was performed to ensure intra-articular placement and the medication was injected. Upon withdrawal of the needle the site was cleaned with alcohol and a band aid applied. The patient tolerated the injection well and there were no adverse effects. Post injection instructions included no strenuous activity for 24 hours, cryotherapy and if there are any adverse effects to contact the office. \par \par \par   100

## 2022-09-26 ENCOUNTER — APPOINTMENT (OUTPATIENT)
Dept: RHEUMATOLOGY | Facility: CLINIC | Age: 52
End: 2022-09-26

## 2022-09-26 VITALS
DIASTOLIC BLOOD PRESSURE: 80 MMHG | TEMPERATURE: 98.4 F | OXYGEN SATURATION: 98 % | HEART RATE: 103 BPM | SYSTOLIC BLOOD PRESSURE: 140 MMHG

## 2022-09-26 PROCEDURE — 99214 OFFICE O/P EST MOD 30 MIN: CPT

## 2022-10-04 ENCOUNTER — APPOINTMENT (OUTPATIENT)
Dept: ORTHOPEDIC SURGERY | Facility: CLINIC | Age: 52
End: 2022-10-04

## 2022-10-04 VITALS
HEART RATE: 93 BPM | DIASTOLIC BLOOD PRESSURE: 84 MMHG | BODY MASS INDEX: 41.99 KG/M2 | SYSTOLIC BLOOD PRESSURE: 127 MMHG | WEIGHT: 310 LBS | HEIGHT: 72 IN

## 2022-10-04 PROCEDURE — 99024 POSTOP FOLLOW-UP VISIT: CPT

## 2022-10-04 PROCEDURE — 73562 X-RAY EXAM OF KNEE 3: CPT | Mod: 26,RT

## 2022-10-04 NOTE — HISTORY OF PRESENT ILLNESS
[2] : the patient reports pain that is 2/10 in severity [Doing Well] : is doing well [Fair Pain Control] : has fair pain control [No Sign of Infection] : is showing no signs of infection [Chills] : no chills [Constipation] : no constipation [Diarrhea] : no diarrhea [Dysuria] : no dysuria [Fever] : no fever [Nausea] : no nausea [Vomiting] : no vomiting [de-identified] : s/p Right total knee arthroplasty.\par  Computer_assisted tibial resection, [facility]Align procedure DOS:08/16/22\par . \par  [de-identified] : pt is PT 3 times per week\par  he c/o night pain - inability to sleep\par he is taking tylenol for pain \par  [de-identified] : Examination of right knee demonstrate well-healed incision. Smooth painless range of motion of 0-110 degree\par \par . The surgical incision site(s) healed, not erythematous, absent of discharge, not swollen and not dehisced. Additional findings included an unremarkable neurological exam, peripheral vascular exam normal and maneuvers demonstrated a negative Laurita's sign. \par \par  [de-identified] : 3 view X-ray of right knee demonstrate implants are in good alignment. No evidence of subsidence or loosening or wear. \par  [de-identified] : rx for celebrex is provided . \par . 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 f/u in 1 month.\par

## 2022-10-18 ENCOUNTER — RX RENEWAL (OUTPATIENT)
Age: 52
End: 2022-10-18

## 2022-10-24 ENCOUNTER — FORM ENCOUNTER (OUTPATIENT)
Age: 52
End: 2022-10-24

## 2022-10-26 ENCOUNTER — APPOINTMENT (OUTPATIENT)
Dept: INTERNAL MEDICINE | Facility: CLINIC | Age: 52
End: 2022-10-26

## 2022-10-26 VITALS
DIASTOLIC BLOOD PRESSURE: 80 MMHG | SYSTOLIC BLOOD PRESSURE: 134 MMHG | BODY MASS INDEX: 41.17 KG/M2 | WEIGHT: 304 LBS | HEIGHT: 72 IN | RESPIRATION RATE: 15 BRPM | HEART RATE: 80 BPM | TEMPERATURE: 98 F | OXYGEN SATURATION: 99 %

## 2022-10-26 DIAGNOSIS — F32.A DEPRESSION, UNSPECIFIED: ICD-10-CM

## 2022-10-26 PROCEDURE — 90686 IIV4 VACC NO PRSV 0.5 ML IM: CPT

## 2022-10-26 PROCEDURE — 99214 OFFICE O/P EST MOD 30 MIN: CPT | Mod: 25

## 2022-10-26 PROCEDURE — G0008: CPT

## 2022-10-29 PROBLEM — F32.A DEPRESSION: Status: ACTIVE | Noted: 2018-12-31

## 2022-10-29 NOTE — REVIEW OF SYSTEMS
[Negative] : Psychiatric [Fever] : no fever [Chills] : no chills [Fatigue] : no fatigue [Chest Pain] : no chest pain [Palpitations] : no palpitations [Lower Ext Edema] : no lower extremity edema [Shortness Of Breath] : no shortness of breath [Wheezing] : no wheezing [Cough] : no cough [Dyspnea on Exertion] : not dyspnea on exertion [Abdominal Pain] : no abdominal pain [Nausea] : no nausea [Diarrhea] : no diarrhea [Vomiting] : no vomiting [Skin Rash] : no skin rash

## 2022-10-29 NOTE — ASSESSMENT
[FreeTextEntry1] : \par Right axilla lymphadenopathy\par -Check ultrasound of right breast/axilla\par -He is to follow-up in 3 to 4 weeks for reevaluation\par -If lymphadenopathy persists he may need further work-up including biopsy\par -Recent CBC 10/25/2022 was normal\par -ESR was 3510/\par \par Depression/anxiety:\par -He is currently on Cymbalta to 60 mg daily\par -He reports he is doing well\par \par Psoriatic arthritis/OA\par -He is followed by rheumatology\par -He is currently on methotrexate, folic acid, Cymbalta, Humira, and gabapentin\par -He reports he was recently seen by dermatologist as well\par \par Psoriasis\par -uses clobestasol cream prn\par -He states he was seen by dermatologist recently\par \par Lung nodule:\par -Follow-up CT chest 2021 showed stable nodule\par -Follow-up CT chest advised ordered at last visit.  He states he has appointment scheduled for this\par \par HTN:\par -BP near goal today at 134/80\par -on losartan 50mg PO daily\par \par Obesity/Pre-DM/fatty liver:\par -HgA1c 6.2 2022-check hemoglobin A1c with next labs in 3 months\par -he should adhere to low fat/low cholesterol diet, low carbohydrate diet and weight loss advised\par \par PORTILLO:\par -on CPAP-followed by pulmonary\par \par Vitamin D Deficiency:\par -OTC vitamin D3 1000 units once a day\par -Check vitamin D 25 OH with next labs\par \par Elevated LFTs/fatty liver:\par -10/25/2022 LFTs were normal\par -hepatitis B and c serologies were negative\par -Weight loss advised\par \par BPH:\par -Sees urologist\par -On Flomax\par -PSA was 0.7 2022\par \par OA s/p right total knee replacement\par -He states he is still having some knee pain but he is currently being followed by orthopedics and he is currently doing physical therapy\par -He states Celebrex has been prescribed by orthopedics and he is waiting prior authorization from his insurance to get this medication\par \par HCM:\par \par CPE 2022\par \par [id number] \par \par Flu shot: advised.  R/B discussed.  No egg allergy reported.  VIS given.  Flu shot given today 10/25/2022\par \par Tdap: 2018\par \par Pneumovax: 2022\par \par Covid vaccine (Pfizer x 2-Moderna booster)-covid 19 bivalent booster advised\par \par Shingles vaccine advised previously\par \par HIV testing: negative 2022\par \par Hepatitis C screening: negative 2015\par \par PSA: 0.70 2022\par \par Depression screenin2022 PHQ 2 score 0\par \par Colonoscopy: 2021\par \par F/U 3 to 4 weeks for follow-up of lymphadenopathy

## 2022-10-29 NOTE — HISTORY OF PRESENT ILLNESS
[de-identified] : Here today for his wife for follow-up of hypertension\par \par He also reports that he recently noted a tender lump in his right axilla.  He states he has had it for a few days.  He denies any recent vaccinations.  He denies any recent fever/chills.  He denies night sweats.  He denies any hand or upper extremity injuries.  He denies arm swelling.

## 2022-10-29 NOTE — PHYSICAL EXAM
[No Acute Distress] : no acute distress [Well-Appearing] : well-appearing [Normal Voice/Communication] : normal voice/communication [Normal Sclera/Conjunctiva] : normal sclera/conjunctiva [PERRL] : pupils equal round and reactive to light [Normal Oropharynx] : the oropharynx was normal [No JVD] : no jugular venous distention [No Lymphadenopathy] : no lymphadenopathy [Supple] : supple [No Respiratory Distress] : no respiratory distress  [No Accessory Muscle Use] : no accessory muscle use [Clear to Auscultation] : lungs were clear to auscultation bilaterally [Normal Rate] : normal rate  [Regular Rhythm] : with a regular rhythm [Normal S1, S2] : normal S1 and S2 [No Murmur] : no murmur heard [No Edema] : there was no peripheral edema [Soft] : abdomen soft [Non Tender] : non-tender [Non-distended] : non-distended [No Masses] : no abdominal mass palpated [No HSM] : no HSM [Normal Bowel Sounds] : normal bowel sounds [No Spinal Tenderness] : no spinal tenderness [No Joint Swelling] : no joint swelling [No Rash] : no rash [No Skin Lesions] : no skin lesions [No Focal Deficits] : no focal deficits [Normal Affect] : the affect was normal [Alert and Oriented x3] : oriented to person, place, and time [Normal Mood] : the mood was normal [Normal Insight/Judgement] : insight and judgment were intact [de-identified] : Right breast examined and there is no nodules or tenderness or skin lesions [de-identified] : Right axilla with oval-shaped mildly tender lymph node approximately 2 cm x 1 cm.  No erythema.  No rash

## 2022-10-31 ENCOUNTER — FORM ENCOUNTER (OUTPATIENT)
Age: 52
End: 2022-10-31

## 2022-11-07 ENCOUNTER — APPOINTMENT (OUTPATIENT)
Dept: ORTHOPEDIC SURGERY | Facility: CLINIC | Age: 52
End: 2022-11-07

## 2022-11-07 DIAGNOSIS — Z96.651 PRESENCE OF RIGHT ARTIFICIAL KNEE JOINT: ICD-10-CM

## 2022-11-07 DIAGNOSIS — Z96.651 AFTERCARE FOLLOWING JOINT REPLACEMENT SURGERY: ICD-10-CM

## 2022-11-07 DIAGNOSIS — Z47.1 AFTERCARE FOLLOWING JOINT REPLACEMENT SURGERY: ICD-10-CM

## 2022-11-07 LAB
ALBUMIN SERPL ELPH-MCNC: 4.2 G/DL
ALP BLD-CCNC: 67 U/L
ALT SERPL-CCNC: 27 U/L
ANION GAP SERPL CALC-SCNC: 8 MMOL/L
AST SERPL-CCNC: 29 U/L
BASOPHILS # BLD AUTO: 0.03 K/UL
BASOPHILS NFR BLD AUTO: 0.5 %
BILIRUB SERPL-MCNC: 0.3 MG/DL
BUN SERPL-MCNC: 14 MG/DL
CALCIUM SERPL-MCNC: 9.8 MG/DL
CHLORIDE SERPL-SCNC: 100 MMOL/L
CO2 SERPL-SCNC: 32 MMOL/L
CREAT SERPL-MCNC: 1.05 MG/DL
CRP SERPL-MCNC: 3 MG/L
EGFR: 86 ML/MIN/1.73M2
EOSINOPHIL # BLD AUTO: 0.38 K/UL
EOSINOPHIL NFR BLD AUTO: 6.3 %
ERYTHROCYTE [SEDIMENTATION RATE] IN BLOOD BY WESTERGREN METHOD: 35 MM/HR
GLUCOSE SERPL-MCNC: 113 MG/DL
HCT VFR BLD CALC: 40.3 %
HGB BLD-MCNC: 13.4 G/DL
IMM GRANULOCYTES NFR BLD AUTO: 0.2 %
LYMPHOCYTES # BLD AUTO: 1.93 K/UL
LYMPHOCYTES NFR BLD AUTO: 31.9 %
MAN DIFF?: NORMAL
MCHC RBC-ENTMCNC: 29.7 PG
MCHC RBC-ENTMCNC: 33.3 GM/DL
MCV RBC AUTO: 89.4 FL
MONOCYTES # BLD AUTO: 0.48 K/UL
MONOCYTES NFR BLD AUTO: 7.9 %
NEUTROPHILS # BLD AUTO: 3.22 K/UL
NEUTROPHILS NFR BLD AUTO: 53.2 %
PLATELET # BLD AUTO: 318 K/UL
POTASSIUM SERPL-SCNC: 4.4 MMOL/L
PROT SERPL-MCNC: 7 G/DL
RBC # BLD: 4.51 M/UL
RBC # FLD: 13.2 %
SODIUM SERPL-SCNC: 140 MMOL/L
WBC # FLD AUTO: 6.05 K/UL

## 2022-11-07 PROCEDURE — 73562 X-RAY EXAM OF KNEE 3: CPT | Mod: RT

## 2022-11-07 PROCEDURE — 99024 POSTOP FOLLOW-UP VISIT: CPT

## 2022-11-07 NOTE — END OF VISIT
[FreeTextEntry3] : I, Nadja Coppola, acted solely as a scribe for Dr. Kevin Shoemaker on 11/07/2022

## 2022-11-07 NOTE — HISTORY OF PRESENT ILLNESS
[1] : the patient reports pain that is 1/10 in severity [Doing Well] : is doing well [No Sign of Infection] : is showing no signs of infection [Adequate Pain Control] : has adequate pain control [Clean/Dry/Intact] : clean, dry and intact [Healed] : healed [Swelling] : swollen [Neuro Intact] : an unremarkable neurological exam [Vascular Intact] : ~T peripheral vascular exam normal [Negative Laurita's] : maneuvers demonstrated a negative Laurita's sign [Xray (Date:___)] : [unfilled] Xray -  [Chills] : no chills [Constipation] : no constipation [Diarrhea] : no diarrhea [Dysuria] : no dysuria [Fever] : no fever [Nausea] : no nausea [Vomiting] : no vomiting [Erythema] : not erythematous [Discharge] : absent of discharge [de-identified] : right knee arthroplasty DOS 8/16/22 [de-identified] : 51 y/o pt is 12 weeks from right TKA. pt still feels weak to go back to work as . He is taking celebrex for pain. He walks without a cane. He is able to climb up stairs normally but come down one foot at a time\par  [de-identified] : Examination of right knee demonstrate well-healed incision. Smooth painless range of motion of 0-110 degree\par \par . The surgical incision site(s) healed, not erythematous, absent of discharge, not swollen and not dehisced. Additional findings included an unremarkable neurological exam, peripheral vascular exam normal and maneuvers demonstrated a negative Laurita's sign. \par \par  [de-identified] :  X-ray of right knee demonstrate implants are in good alignment. No evidence of subsidence or loosening or wear. \par  \par  [de-identified] : Pt will continue PT for strengthening. He will return to work on January 9, 2023. He should continue to do low impact exercises. Pt understands the importance of prophylaxis for invasive dental procedures. F/u with us a year from surgery.

## 2022-11-13 ENCOUNTER — NON-APPOINTMENT (OUTPATIENT)
Age: 52
End: 2022-11-13

## 2022-11-22 ENCOUNTER — RX RENEWAL (OUTPATIENT)
Age: 52
End: 2022-11-22

## 2022-12-02 ENCOUNTER — APPOINTMENT (OUTPATIENT)
Dept: RHEUMATOLOGY | Facility: CLINIC | Age: 52
End: 2022-12-02

## 2022-12-02 VITALS
HEART RATE: 98 BPM | OXYGEN SATURATION: 97 % | SYSTOLIC BLOOD PRESSURE: 140 MMHG | TEMPERATURE: 98.2 F | DIASTOLIC BLOOD PRESSURE: 79 MMHG

## 2022-12-02 PROCEDURE — 99214 OFFICE O/P EST MOD 30 MIN: CPT

## 2022-12-02 RX ORDER — NABUMETONE 750 MG/1
750 TABLET, FILM COATED ORAL
Qty: 180 | Refills: 1 | Status: DISCONTINUED | COMMUNITY
Start: 2022-08-18 | End: 2022-12-02

## 2022-12-02 RX ORDER — MUPIROCIN 20 MG/G
2 OINTMENT TOPICAL
Qty: 22 | Refills: 0 | Status: DISCONTINUED | COMMUNITY
Start: 2022-07-29 | End: 2022-12-02

## 2022-12-02 RX ORDER — NABUMETONE 750 MG/1
750 TABLET, FILM COATED ORAL
Qty: 60 | Refills: 2 | Status: DISCONTINUED | COMMUNITY
Start: 2022-09-26 | End: 2022-12-02

## 2022-12-02 RX ORDER — OMEPRAZOLE 40 MG/1
40 CAPSULE, DELAYED RELEASE ORAL
Qty: 30 | Refills: 0 | Status: DISCONTINUED | COMMUNITY
Start: 2022-08-17 | End: 2022-12-02

## 2022-12-02 RX ORDER — TRAMADOL HYDROCHLORIDE 50 MG/1
50 TABLET, COATED ORAL EVERY 6 HOURS
Qty: 30 | Refills: 0 | Status: DISCONTINUED | COMMUNITY
Start: 2022-08-29 | End: 2022-12-02

## 2022-12-02 RX ORDER — ASPIRIN 325 MG/1
325 TABLET, COATED ORAL
Qty: 60 | Refills: 0 | Status: DISCONTINUED | COMMUNITY
Start: 2022-08-17 | End: 2022-12-02

## 2022-12-02 NOTE — HISTORY OF PRESENT ILLNESS
[FreeTextEntry1] : 51 yo man with history of HTN, obesity, depression/anxiety, FATTY LIVER  enlarged prostate, knee OA, psoriasis and PSA. \par FOBT positive , had normal c-scope\par on MTX 8 tabs developed abnormal LFts \par \par today: on humira q weekly and MTX 6 with FA patient states that he is doing well.  minimal joint pain and rare joint swelling.  with rain he does notice increase joint pain.  knee surgery to right knee healing well and continues to do PT.  patient is also in cymbalta 60 at night and gabapentin 100 in the morning.  psoriasis also well controlled on this regimen \par had axilla LAD noted by patient.  has US which showed Right axilla LAd.  followed by CCT with no LAD.  patient states this has gone down \par of note had recent covid vaccine \par \par Of note patient was seen by ortho and will be getting knee surgery aug 16 2022\par labs in the past : \par cbc aND CMP NORMAL \par esr 19 AND CRP 5 \par LYME NEGATIVE\par ZAHRA 1.80 NUCLEOLAR\par NEGATIVE CCP/RF/DSDNA/CAMPBELL/SJOGRENS/SCL 70/DEB/CENTROMERE\par URIC 5.7\par CPK NORMAL\par PSA NORMAL\par TSH NORMAL \par hepb/c/quantiferon  negative ( 6/2021)\par \par xrays: \par shoulders normal\par hips normal\par hand normal\par Left foot calcaneal spur\par r foot normal\par R knee marked OA \par L knee mild OA \par ankles with mod calcification of the distal Achilles b/l  and left moderate proximal plantar fascia calcifications \par \par patient denies any IBD s/s, history of STDs, sausage digits, nail changes, red painful eye \par lupus ROS negative \par \par

## 2022-12-02 NOTE — PHYSICAL EXAM
[General Appearance - Well Nourished] : well nourished [General Appearance - Well Developed] : well developed [Sclera] : the sclera and conjunctiva were normal [Hearing Threshold Finger Rub Not [place]] : hearing was normal [Nail Clubbing] : no clubbing  or cyanosis of the fingernails [Musculoskeletal - Swelling] : no joint swelling seen [Motor Tone] : muscle strength and tone were normal [FreeTextEntry1] : right knee scar minimal warmth relative normal ROM  [] : no rash [Skin Lesions] : no skin lesions [Affect] : the affect was normal [Mood] : the mood was normal

## 2022-12-02 NOTE — ASSESSMENT
[FreeTextEntry1] : 51 yo man with morbid obesity, depression/anxiety, marked right knee OA ,psoriasis and PSA  and fibromyalgia . s/p right knee prosthesis, on humira and MTX, cymbalta and gabapentin and doing very well \par  \par --cont mTX 6 and FA \par --cont humira every 7 days \par --fibromyalgia better cont cymbalta and gabapentin \par --uptodate with covid vaccine, flu and prevnar \par --will monitor for recurrence of LAD \par --will need ECHO given questionable pHTN on CCT.  patient to see cardiology \par

## 2022-12-06 ENCOUNTER — APPOINTMENT (OUTPATIENT)
Dept: INTERNAL MEDICINE | Facility: CLINIC | Age: 52
End: 2022-12-06

## 2022-12-06 VITALS
SYSTOLIC BLOOD PRESSURE: 122 MMHG | HEIGHT: 72 IN | HEART RATE: 70 BPM | WEIGHT: 308 LBS | OXYGEN SATURATION: 98 % | BODY MASS INDEX: 41.72 KG/M2 | TEMPERATURE: 97.9 F | DIASTOLIC BLOOD PRESSURE: 70 MMHG | RESPIRATION RATE: 16 BRPM

## 2022-12-06 DIAGNOSIS — Q25.79 OTHER CONGENITAL MALFORMATIONS OF PULMONARY ARTERY: ICD-10-CM

## 2022-12-06 DIAGNOSIS — R59.0 LOCALIZED ENLARGED LYMPH NODES: ICD-10-CM

## 2022-12-06 PROCEDURE — 99213 OFFICE O/P EST LOW 20 MIN: CPT

## 2022-12-10 PROBLEM — R59.0 AXILLARY LYMPHADENOPATHY: Status: ACTIVE | Noted: 2022-10-26

## 2022-12-10 NOTE — HISTORY OF PRESENT ILLNESS
[de-identified] : Here today for follow-up of his recent right axillary adenopathy and to follow-up on recent imaging results\par \par He reports right axillary adenopathy has resolved completely\par \par He states he feels well and denies any new complaints\par \par He is here today with his wife

## 2022-12-10 NOTE — PHYSICAL EXAM
[No Acute Distress] : no acute distress [Well-Appearing] : well-appearing [Normal Voice/Communication] : normal voice/communication [Normal Sclera/Conjunctiva] : normal sclera/conjunctiva [PERRL] : pupils equal round and reactive to light [Normal Oropharynx] : the oropharynx was normal [No JVD] : no jugular venous distention [No Lymphadenopathy] : no lymphadenopathy [Supple] : supple [No Respiratory Distress] : no respiratory distress  [No Accessory Muscle Use] : no accessory muscle use [Clear to Auscultation] : lungs were clear to auscultation bilaterally [Normal Rate] : normal rate  [Regular Rhythm] : with a regular rhythm [Normal S1, S2] : normal S1 and S2 [No Murmur] : no murmur heard [No Edema] : there was no peripheral edema [No Rash] : no rash [No Focal Deficits] : no focal deficits [Normal Affect] : the affect was normal [Alert and Oriented x3] : oriented to person, place, and time [Normal Mood] : the mood was normal [Normal Insight/Judgement] : insight and judgment were intact [de-identified] : Right axilla area examined and there is no adenopathy or tenderness

## 2022-12-10 NOTE — ASSESSMENT
[FreeTextEntry1] : \par Right axilla lymphadenopathy\par -His right axilla adenopathy has resolved\par -His right axilla ultrasound showed benign-appearing axillary lymph nodes\par -He should notify office if symptoms recur\par \par Depression/anxiety:\par -Cymbalta to 60 mg daily\par \par Psoriatic arthritis/OA\par -He is followed by rheumatology and dermatology\par -methotrexate, folic acid, Cymbalta, Humira, and gabapentin\par \par Psoriasis\par -clobestasol cream prn\par -Followed by dermatology\par \par Lung nodule:\par -He had CT of chest 2022 which showed a stable right lower lobe nodule dating back to 2019.\par -His CT chest showed prominent caliber of the main pulmonary artery which may be associated with pulmonary hypertension\par -I have advised that he see cardiology for echo to assess for pulmonary hypertension-referral given\par \par HTN:\par -BP at goal \par -losartan 50mg PO daily\par \par Obesity/Pre-DM:\par -HgA1c 6.2 2022-check hemoglobin A1c with next labs in 3 months\par -he should adhere to low fat/low cholesterol diet, low carbohydrate diet and weight loss advised\par \par PORTILLO:\par -on CPAP-followed by pulmonary\par \par Vitamin D Deficiency:\par -OTC vitamin D3 1000 units once a day\par -Check vitamin D 25 OH with next labs\par \par Elevated LFTs/fatty liver:\par -10/25/2022 LFTs were normal\par -hepatitis B and c serologies were negative\par -Weight loss advised\par \par BPH:\par -Sees urologist\par -On Flomax\par -PSA was 0.7 2022\par \par OA s/p right total knee replacement\par -Followed by orthopedics \par -Celebrex as needed\par -Participating in PT\par \par HCM:\par \par CPE 2022\par \par [id number] \par \par Flu shot: 10/25/2022\par \par Tdap: 2018\par \par Pneumovax: 2022\par \par Covid vaccine (Pfizer x 2-Moderna booster)-covid 19 bivalent booster advised\par \par Shingles vaccine advised previously\par \par HIV testing: negative 2022\par \par Hepatitis C screening: negative 2015\par \par PSA: 0.70 2022\par \par Depression screenin2022 PHQ 2 score 0\par \par Colonoscopy: 2021\par \par F/U 3 months

## 2022-12-28 ENCOUNTER — NON-APPOINTMENT (OUTPATIENT)
Age: 52
End: 2022-12-28

## 2023-01-03 ENCOUNTER — APPOINTMENT (OUTPATIENT)
Dept: UROLOGY | Facility: CLINIC | Age: 53
End: 2023-01-03
Payer: COMMERCIAL

## 2023-01-03 VITALS
RESPIRATION RATE: 16 BRPM | HEART RATE: 103 BPM | WEIGHT: 308 LBS | BODY MASS INDEX: 41.72 KG/M2 | DIASTOLIC BLOOD PRESSURE: 84 MMHG | SYSTOLIC BLOOD PRESSURE: 122 MMHG | HEIGHT: 72 IN | TEMPERATURE: 98 F

## 2023-01-03 DIAGNOSIS — Z12.5 ENCOUNTER FOR SCREENING FOR MALIGNANT NEOPLASM OF PROSTATE: ICD-10-CM

## 2023-01-03 DIAGNOSIS — N20.0 CALCULUS OF KIDNEY: ICD-10-CM

## 2023-01-03 PROCEDURE — 99214 OFFICE O/P EST MOD 30 MIN: CPT

## 2023-01-03 NOTE — HISTORY OF PRESENT ILLNESS
[FreeTextEntry1] : 53 yo male presents for follow up. \par Taking Flomax and Solifenacin 5 mg daily.\par Urinates with reasonable stream, every 2 to 3 hours or so during the day, nocturia 1 to 2 x.\par Denies hesitancy, straining, intermittency, urgency, incontinence, sense of incomplete emptying. \par Has occasional post void dribbling. Denies dysuria, hematuria, fever, chills or rigors.\par No longer has left back pain and bilateral testis pain. \par Normal libido, erections and ejaculation.\par \par Status post right knee replacement in August 2022. \par On Humira since last summer. Psoriatic arthritis. \par \par CT scan(11/21): 5 mm left upper pole kidney stone.  \par \par Status post cystoscopy and left ureteral stent removal 7/31/19.\par Status post left ureteroscopy, laser lithotripsy, stone extraction and ureteral stent exchange at [facility] on 7/19/19. \par Status post Left ESWL on 3/15/19 at [facility]. \par \par Initially seen for Kidney stone. \par Had seen another Urologist in the past and underwent Cystoscopy for Microhematuria and Prostate biopsy for Prostate nodule.

## 2023-01-03 NOTE — ASSESSMENT
[FreeTextEntry1] : Reviewed records.\par Discussed labs and imaging. \par \par Kidney stone:\par Discussed treatment options. Will continue to Observe.\par \par Benign Prostatic Hyperplasia \par Discussed treatment options, will continue Flomax. \par \par Overactive Bladder:\par Discussed treatment options, will continue Solifenacin.\par \par PSA Screening:\par Discussed PSA screening and latest recommendations/guidelines: USPTF and AUA. \par Continue PSA Screening. \par \par Will get Renal and Bladder Ultrasound.\par Will inform results. \par \par Return to office in 1 year or sooner if any issues. \par .

## 2023-01-03 NOTE — PHYSICAL EXAM
[Normal Appearance] : normal appearance [General Appearance - In No Acute Distress] : no acute distress [Abdomen Soft] : soft [Abdomen Tenderness] : non-tender [FreeTextEntry1] : normal peripheral circulation  [] : no respiratory distress [Oriented To Time, Place, And Person] : oriented to person, place, and time [Normal Station and Gait] : the gait and station were normal for the patient's age

## 2023-01-05 ENCOUNTER — RX RENEWAL (OUTPATIENT)
Age: 53
End: 2023-01-05

## 2023-01-09 NOTE — PHYSICAL EXAM
[General Appearance - Well Nourished] : well nourished [General Appearance - Well Developed] : well developed [Sclera] : the sclera and conjunctiva were normal [Hearing Threshold Finger Rub Not [place]] : hearing was normal [Nail Clubbing] : no clubbing  or cyanosis of the fingernails [Musculoskeletal - Swelling] : no joint swelling seen [Motor Tone] : muscle strength and tone were normal [FreeTextEntry1] : right knee scar minimal warmth relative normal ROM  [] : no rash [Skin Lesions] : no skin lesions [Affect] : the affect was normal [Mood] : the mood was normal

## 2023-01-09 NOTE — PHYSICAL EXAM
[General Appearance - Well Nourished] : well nourished [General Appearance - Well Developed] : well developed [Sclera] : the sclera and conjunctiva were normal [Hearing Threshold Finger Rub Not [place]] : hearing was normal [Nail Clubbing] : no clubbing  or cyanosis of the fingernails [Musculoskeletal - Swelling] : no joint swelling seen [Motor Tone] : muscle strength and tone were normal [FreeTextEntry1] : right knee scar minimal warmth relative normal ROM  [] : no rash [Skin Lesions] : no skin lesions [Affect] : the affect was normal [Mood] : the mood was normal

## 2023-01-09 NOTE — ASSESSMENT
[FreeTextEntry1] : 53 yo man with morbid obesity, depression/anxiety, marked right knee OA ,psoriasis and PSA  and fibromyalgia . s/p right knee prosthesis, on humira and MTX, cymbalta and gabapentin and doing very well \par  \par --cont mTX 6 and FA \par --cont humira every 7 days \par --fibromyalgia better cont cymbalta and gabapentin \par --uptodate with covid vaccine, flu and prevnar \par --will monitor for recurrence of LAD \par --will need ECHO given questionable pHTN on CCT.  patient to see cardiology \par

## 2023-01-09 NOTE — HISTORY OF PRESENT ILLNESS
[FreeTextEntry1] : 49 yo man with history of HTN, obesity, depression/anxiety, FATTY LIVER  enlarged prostate, knee OA, psoriasis and PSA. \par FOBT positive , had normal c-scope\par on MTX 8 tabs developed abnormal LFts \par \par today: on humira q weekly and MTX 6 with FA patient states that he is doing well.  minimal joint pain and rare joint swelling.  with rain he does notice increase joint pain.  knee surgery to right knee healing well and continues to do PT.  patient is also in cymbalta 60 at night and gabapentin 100 in the morning.  psoriasis also well controlled on this regimen \par had axilla LAD noted by patient.  has US which showed Right axilla LAd.  followed by CCT with no LAD.  patient states this has gone down \par of note had recent covid vaccine \par \par Of note patient was seen by ortho and will be getting knee surgery aug 16 2022\par labs in the past : \par cbc aND CMP NORMAL \par esr 19 AND CRP 5 \par LYME NEGATIVE\par ZAHRA 1.80 NUCLEOLAR\par NEGATIVE CCP/RF/DSDNA/CAMPBELL/SJOGRENS/SCL 70/DEB/CENTROMERE\par URIC 5.7\par CPK NORMAL\par PSA NORMAL\par TSH NORMAL \par hepb/c/quantiferon  negative ( 6/2021)\par \par xrays: \par shoulders normal\par hips normal\par hand normal\par Left foot calcaneal spur\par r foot normal\par R knee marked OA \par L knee mild OA \par ankles with mod calcification of the distal Achilles b/l  and left moderate proximal plantar fascia calcifications \par \par patient denies any IBD s/s, history of STDs, sausage digits, nail changes, red painful eye \par lupus ROS negative \par \par

## 2023-02-03 ENCOUNTER — NON-APPOINTMENT (OUTPATIENT)
Age: 53
End: 2023-02-03

## 2023-02-03 ENCOUNTER — APPOINTMENT (OUTPATIENT)
Dept: CARDIOLOGY | Facility: CLINIC | Age: 53
End: 2023-02-03
Payer: COMMERCIAL

## 2023-02-03 VITALS
OXYGEN SATURATION: 95 % | SYSTOLIC BLOOD PRESSURE: 120 MMHG | BODY MASS INDEX: 41.99 KG/M2 | HEART RATE: 82 BPM | WEIGHT: 310 LBS | DIASTOLIC BLOOD PRESSURE: 82 MMHG | TEMPERATURE: 97.6 F | HEIGHT: 72 IN

## 2023-02-03 PROCEDURE — 99204 OFFICE O/P NEW MOD 45 MIN: CPT

## 2023-02-03 PROCEDURE — 93000 ELECTROCARDIOGRAM COMPLETE: CPT

## 2023-02-03 RX ORDER — CELECOXIB 200 MG/1
200 CAPSULE ORAL
Qty: 42 | Refills: 0 | Status: DISCONTINUED | COMMUNITY
Start: 2022-10-13 | End: 2023-02-03

## 2023-02-03 RX ORDER — OMEPRAZOLE 40 MG/1
40 CAPSULE, DELAYED RELEASE ORAL
Qty: 30 | Refills: 0 | Status: DISCONTINUED | COMMUNITY
Start: 2022-08-17 | End: 2023-02-03

## 2023-02-03 RX ORDER — CELECOXIB 200 MG/1
200 CAPSULE ORAL
Qty: 28 | Refills: 0 | Status: DISCONTINUED | COMMUNITY
Start: 2022-08-29 | End: 2023-02-03

## 2023-02-03 NOTE — CARDIOLOGY SUMMARY
[de-identified] : 2/3/23- Sinus 71, low voltage precordial, no ST changes, QTc 381 [de-identified] : outside study 10/2020- PET stress test done in 10/2020 without ischemia/infarct [de-identified] : outside study 10/2020- Echo done at that time also with LV EF 59%, normal RV size/function with RVSP 16 mmHg

## 2023-02-03 NOTE — DISCUSSION/SUMMARY
[FreeTextEntry1] : 52M h/o morbid obesity (BMI 42), PORTILLO/CPAP, fatty liver, HTN, psoriatic arthritis, anxiety/depression, GERD, BPH, lung nodule with recent outside repeat CT chest done at [facility] in 10/2022 with incidental reports of prominent caliber of the main pulmonary artery concerning for pulmonary hypertension (prior CT chest 2021 without mention of), refer for cardiology evaluation. \par \par Recent exertional dyspnea likely related to deconditioning from obesity and has been sedentary for 5 months postop from knee surgery, EKG no ST abnormality, no clinical signs of pulmonary hypertension, suspect prominent PA likely normal index to patient's body BSA and h/o PORTILLO. Will repeat TTE to exclude elevated PASP or RV dysfunction. \par \par \par 1. Prominent pulmonary artery- await TTE, if normal then no further cardiac testing indicated for now. \par \par 2. HTN- at goal on losartan. \par \par 3. Psoriatic arthritis- on MTX and biologic\par \par 4. Morbid obesity- advised dieting and weight loss; he is not considering bariatric surgery for now. \par \par \par \par \par Follow up as needed if TTE is normal.  [EKG obtained to assist in diagnosis and management of assessed problem(s)] : EKG obtained to assist in diagnosis and management of assessed problem(s)

## 2023-02-03 NOTE — HISTORY OF PRESENT ILLNESS
[FreeTextEntry1] : 52M h/o morbid obesity (BMI 42), PORTILLO/CPAP, fatty liver, HTN, psoriatic arthritis, anxiety/depression, GERD, BPH, lung nodule with recent outside repeat CT chest done at [facility] in 10/2022 with incidental reports of prominent caliber of the main pulmonary artery concerning for pulmonary hypertension (prior CT chest 2021 without mention of), refer for cardiology evaluation. \par \par Patient presents with his wife for the visit. \par Denies chest pain, reports exertional dyspnea after returned to work prevoiusly been off for 5 months for knee replacement, since then been ambulating better but has not start any exercise routine, getting physical therapy. \par \par Previously had to been [facility] cardiology seen by Dr. Flores with symptoms of ches tightness of breath, PET stress test done in 10/2020 without ischemia/infarct; Echo done at that time also with LV EF 59%, normal RV size/function with RVSP 16 mmHg\par \par Lipid panel 5/2022: , HDL 38, ; A1c 6.2%\par \par Former smoker, social alcohol\par Aunt with stroke

## 2023-02-03 NOTE — PHYSICAL EXAM

## 2023-03-06 ENCOUNTER — APPOINTMENT (OUTPATIENT)
Dept: INTERNAL MEDICINE | Facility: CLINIC | Age: 53
End: 2023-03-06

## 2023-03-06 ENCOUNTER — APPOINTMENT (OUTPATIENT)
Dept: RHEUMATOLOGY | Facility: CLINIC | Age: 53
End: 2023-03-06
Payer: COMMERCIAL

## 2023-03-06 VITALS
RESPIRATION RATE: 17 BRPM | OXYGEN SATURATION: 97 % | SYSTOLIC BLOOD PRESSURE: 150 MMHG | DIASTOLIC BLOOD PRESSURE: 86 MMHG | TEMPERATURE: 97.2 F | BODY MASS INDEX: 41.72 KG/M2 | WEIGHT: 308 LBS | HEART RATE: 87 BPM | HEIGHT: 72 IN

## 2023-03-06 DIAGNOSIS — L40.9 PSORIASIS, UNSPECIFIED: ICD-10-CM

## 2023-03-06 LAB
ALBUMIN SERPL ELPH-MCNC: 4.7 G/DL
ALP BLD-CCNC: 79 U/L
ALT SERPL-CCNC: 57 U/L
ANION GAP SERPL CALC-SCNC: 13 MMOL/L
AST SERPL-CCNC: 45 U/L
BASOPHILS # BLD AUTO: 0.04 K/UL
BASOPHILS NFR BLD AUTO: 0.6 %
BILIRUB SERPL-MCNC: 0.5 MG/DL
BUN SERPL-MCNC: 17 MG/DL
CALCIUM SERPL-MCNC: 9.7 MG/DL
CHLORIDE SERPL-SCNC: 100 MMOL/L
CO2 SERPL-SCNC: 27 MMOL/L
CREAT SERPL-MCNC: 0.99 MG/DL
CRP SERPL-MCNC: 3 MG/L
EGFR: 92 ML/MIN/1.73M2
EOSINOPHIL # BLD AUTO: 0.45 K/UL
EOSINOPHIL NFR BLD AUTO: 6.6 %
ERYTHROCYTE [SEDIMENTATION RATE] IN BLOOD BY WESTERGREN METHOD: 29 MM/HR
GLUCOSE SERPL-MCNC: 55 MG/DL
HCT VFR BLD CALC: 43.9 %
HGB BLD-MCNC: 14.6 G/DL
IMM GRANULOCYTES NFR BLD AUTO: 0.1 %
LYMPHOCYTES # BLD AUTO: 1.81 K/UL
LYMPHOCYTES NFR BLD AUTO: 26.5 %
MAN DIFF?: NORMAL
MCHC RBC-ENTMCNC: 31.6 PG
MCHC RBC-ENTMCNC: 33.3 GM/DL
MCV RBC AUTO: 95 FL
MONOCYTES # BLD AUTO: 0.73 K/UL
MONOCYTES NFR BLD AUTO: 10.7 %
NEUTROPHILS # BLD AUTO: 3.8 K/UL
NEUTROPHILS NFR BLD AUTO: 55.5 %
PLATELET # BLD AUTO: 280 K/UL
POTASSIUM SERPL-SCNC: 4 MMOL/L
PROT SERPL-MCNC: 7.4 G/DL
RBC # BLD: 4.62 M/UL
RBC # FLD: 14.2 %
SODIUM SERPL-SCNC: 140 MMOL/L
WBC # FLD AUTO: 6.84 K/UL

## 2023-03-06 PROCEDURE — 99214 OFFICE O/P EST MOD 30 MIN: CPT

## 2023-03-06 RX ORDER — METHOTREXATE 2.5 MG/1
2.5 TABLET ORAL
Qty: 48 | Refills: 1 | Status: DISCONTINUED | COMMUNITY
Start: 2021-09-20 | End: 2023-03-06

## 2023-03-06 RX ORDER — FOLIC ACID 1 MG/1
1 TABLET ORAL DAILY
Qty: 90 | Refills: 1 | Status: DISCONTINUED | COMMUNITY
Start: 2021-07-12 | End: 2023-03-06

## 2023-03-06 NOTE — PHYSICAL EXAM
[General Appearance - Well Nourished] : well nourished [General Appearance - Well Developed] : well developed [Sclera] : the sclera and conjunctiva were normal [Hearing Threshold Finger Rub Not [place]] : hearing was normal [Nail Clubbing] : no clubbing  or cyanosis of the fingernails [Motor Tone] : muscle strength and tone were normal [] : no rash [Skin Lesions] : no skin lesions [FreeTextEntry1] : psoriasis of the feet, plantar aspect with large plaque and skin fissure and bleeding  [Affect] : the affect was normal [Mood] : the mood was normal

## 2023-03-06 NOTE — ASSESSMENT
[FreeTextEntry1] : 53 yo man with morbid obesity, depression/anxiety, marked right knee OA s/p right TKR ,psoriasis and PSA  and fibromyalgia . lost response to humira and MTX combination.  patient also with mildly elevated LFTs.  \par  \par --start cosentyx loading dose \par --stop mTX 6 and FA. LFT rising again  \par --stop humira every 7 days.  no longer eeficacious \par --fibromyalgia better cont cymbalta and gabapentin \par --uptodate with covid vaccine, flu and prevnar \par --will monitor for recurrence of LAD \par --pending ECHO given questionable pHTN on CCT.  patient to see cardiology \par --screening test today \par

## 2023-03-06 NOTE — HISTORY OF PRESENT ILLNESS
[FreeTextEntry1] : 53 yo man with history of HTN, obesity, depression/anxiety, FATTY LIVER  enlarged prostate, knee OA s/p Right TKR , psoriasis and PSA. \par FOBT positive , had normal c-scope\par on MTX 8 tabs developed abnormal LFts \par \par today: on humira q weekly and MTX 6 with FA, patient states that he feels like humira is no longer working. his LFTs are mildly elevated.   he has increase joint pain and swelling of his hands.  his right surgery went well and this knee feels much better. .  patient is also in cymbalta 60 at night and gabapentin 100 in the morning.  psoriasis is also poorly controlled.  his has large plaque on the plantar foot with open skin that is very painful. \par had axilla LAD noted by patient.  has US which showed Right axilla LAd.  followed by CCT with no LAD.  patient states this has resolved \par \par labs in the past : \par cbc aND CMP NORMAL \par esr 19 AND CRP 5 \par LYME NEGATIVE\par ZAHRA 1.80 NUCLEOLAR\par NEGATIVE CCP/RF/DSDNA/CAMPBELL/SJOGRENS/SCL 70/DEB/CENTROMERE\par URIC 5.7\par CPK NORMAL\par PSA NORMAL\par TSH NORMAL \par hepb/c/quantiferon  negative ( 6/2021)\par \par xrays: \par shoulders normal\par hips normal\par hand normal\par Left foot calcaneal spur\par r foot normal\par R knee marked OA \par L knee mild OA \par ankles with mod calcification of the distal Achilles b/l  and left moderate proximal plantar fascia calcifications \par \par patient denies any IBD s/s, history of STDs, sausage digits, nail changes, red painful eye \par lupus ROS negative \par \par

## 2023-03-09 ENCOUNTER — NON-APPOINTMENT (OUTPATIENT)
Age: 53
End: 2023-03-09

## 2023-03-22 RX ORDER — SECUKINUMAB 150 MG/ML
150 INJECTION SUBCUTANEOUS
Qty: 5 | Refills: 1 | Status: DISCONTINUED | COMMUNITY
Start: 2023-03-06 | End: 2023-03-22

## 2023-03-23 LAB
HBV CORE IGG+IGM SER QL: NONREACTIVE
HBV SURFACE AB SER QL: NONREACTIVE
HBV SURFACE AG SER QL: NONREACTIVE
HCV AB SER QL: NONREACTIVE
HCV S/CO RATIO: 0.05 S/CO
HIV1+2 AB SPEC QL IA.RAPID: NONREACTIVE
M TB IFN-G BLD-IMP: NEGATIVE
QUANTIFERON TB PLUS MITOGEN MINUS NIL: 6.08 IU/ML
QUANTIFERON TB PLUS NIL: 0.02 IU/ML
QUANTIFERON TB PLUS TB1 MINUS NIL: -0.01 IU/ML
QUANTIFERON TB PLUS TB2 MINUS NIL: -0.01 IU/ML

## 2023-04-03 ENCOUNTER — NON-APPOINTMENT (OUTPATIENT)
Age: 53
End: 2023-04-03

## 2023-05-22 ENCOUNTER — RX RENEWAL (OUTPATIENT)
Age: 53
End: 2023-05-22

## 2023-05-22 ENCOUNTER — APPOINTMENT (OUTPATIENT)
Dept: RHEUMATOLOGY | Facility: CLINIC | Age: 53
End: 2023-05-22
Payer: COMMERCIAL

## 2023-05-22 VITALS
HEART RATE: 85 BPM | OXYGEN SATURATION: 96 % | RESPIRATION RATE: 17 BRPM | SYSTOLIC BLOOD PRESSURE: 144 MMHG | DIASTOLIC BLOOD PRESSURE: 90 MMHG | TEMPERATURE: 98.1 F | HEIGHT: 72 IN | WEIGHT: 308 LBS | BODY MASS INDEX: 41.72 KG/M2

## 2023-05-22 LAB
HBV CORE IGG+IGM SER QL: NONREACTIVE
HBV SURFACE AB SER QL: NONREACTIVE
HBV SURFACE AG SER QL: NONREACTIVE
HCV AB SER QL: NONREACTIVE
HCV S/CO RATIO: 0.1 S/CO
HIV1+2 AB SPEC QL IA.RAPID: NONREACTIVE
M TB IFN-G BLD-IMP: NEGATIVE
QUANTIFERON TB PLUS MITOGEN MINUS NIL: 9.51 IU/ML
QUANTIFERON TB PLUS NIL: 0.01 IU/ML
QUANTIFERON TB PLUS TB1 MINUS NIL: 0.01 IU/ML
QUANTIFERON TB PLUS TB2 MINUS NIL: 0 IU/ML

## 2023-05-22 PROCEDURE — 99214 OFFICE O/P EST MOD 30 MIN: CPT

## 2023-05-22 RX ORDER — DULOXETINE HYDROCHLORIDE 60 MG/1
60 CAPSULE, DELAYED RELEASE PELLETS ORAL
Qty: 90 | Refills: 0 | Status: DISCONTINUED | COMMUNITY
Start: 2021-04-20 | End: 2023-05-22

## 2023-05-22 RX ORDER — GABAPENTIN 100 MG/1
100 CAPSULE ORAL
Qty: 90 | Refills: 3 | Status: DISCONTINUED | COMMUNITY
Start: 2022-06-02 | End: 2023-05-22

## 2023-05-22 RX ORDER — ADALIMUMAB 40MG/0.4ML
KIT SUBCUTANEOUS
Qty: 4 | Refills: 4 | Status: DISCONTINUED | COMMUNITY
Start: 2021-08-30 | End: 2023-05-22

## 2023-05-22 NOTE — PHYSICAL EXAM
[General Appearance - Well Nourished] : well nourished [General Appearance - Well Developed] : well developed [Sclera] : the sclera and conjunctiva were normal [Hearing Threshold Finger Rub Not [place]] : hearing was normal [Nail Clubbing] : no clubbing  or cyanosis of the fingernails [Motor Tone] : muscle strength and tone were normal [] : no rash [Skin Lesions] : no skin lesions [Affect] : the affect was normal [Mood] : the mood was normal [FreeTextEntry1] : psoriasis of the feet, plantar aspect with large plaque and skin fissure and bleeding
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
## 2023-05-22 NOTE — ASSESSMENT
[FreeTextEntry1] : 51 yo man with morbid obesity, depression/anxiety, marked right knee OA s/p right TKR ,psoriasis and PSA  and fibromyalgia . lost response to humira and MTX combination.  now on Enbrel and doing very well \par  \par --cont enbrel \par --drug monitoring labs prior to next visit \par --fibromyalgia improved and he self d/c cymbalta and gabapentin  \par --uptodate with covid vaccine, flu and prevnar \par --will monitor for recurrence of LAD \par --pending ECHO given questionable pHTN on CCT.  patient to see cardiology \par \par

## 2023-06-09 ENCOUNTER — APPOINTMENT (OUTPATIENT)
Dept: RHEUMATOLOGY | Facility: CLINIC | Age: 53
End: 2023-06-09
Payer: COMMERCIAL

## 2023-06-09 VITALS
DIASTOLIC BLOOD PRESSURE: 65 MMHG | OXYGEN SATURATION: 95 % | SYSTOLIC BLOOD PRESSURE: 140 MMHG | HEART RATE: 82 BPM | TEMPERATURE: 98.6 F

## 2023-06-09 LAB
ALBUMIN SERPL ELPH-MCNC: 4.7 G/DL
ALP BLD-CCNC: 68 U/L
ALT SERPL-CCNC: 37 U/L
ANION GAP SERPL CALC-SCNC: 14 MMOL/L
AST SERPL-CCNC: 34 U/L
BILIRUB SERPL-MCNC: 0.6 MG/DL
BUN SERPL-MCNC: 15 MG/DL
CALCIUM SERPL-MCNC: 9.7 MG/DL
CHLORIDE SERPL-SCNC: 100 MMOL/L
CO2 SERPL-SCNC: 26 MMOL/L
CREAT SERPL-MCNC: 0.95 MG/DL
CRP SERPL-MCNC: <3 MG/L
EGFR: 97 ML/MIN/1.73M2
ERYTHROCYTE [SEDIMENTATION RATE] IN BLOOD BY WESTERGREN METHOD: 23 MM/HR
GLUCOSE SERPL-MCNC: 57 MG/DL
POTASSIUM SERPL-SCNC: 3.9 MMOL/L
PROT SERPL-MCNC: 7.5 G/DL
SODIUM SERPL-SCNC: 140 MMOL/L

## 2023-06-09 PROCEDURE — 99214 OFFICE O/P EST MOD 30 MIN: CPT

## 2023-06-15 NOTE — PHYSICAL EXAM
[General Appearance - Well Nourished] : well nourished [General Appearance - Well Developed] : well developed [Sclera] : the sclera and conjunctiva were normal [Hearing Threshold Finger Rub Not [place]] : hearing was normal [Nail Clubbing] : no clubbing  or cyanosis of the fingernails [Motor Tone] : muscle strength and tone were normal [] : no rash [Skin Lesions] : no skin lesions [Affect] : the affect was normal [Mood] : the mood was normal [FreeTextEntry1] : psoriasis of the feet, plantar aspect with large plaque and skin fissure and bleeding

## 2023-06-15 NOTE — ASSESSMENT
[FreeTextEntry1] : 51 yo man with morbid obesity, depression/anxiety, marked right knee OA s/p right TKR ,psoriasis and PSA  and fibromyalgia . lost response to humira and MTX combination.  now on Enbrel and doing very well \par  \par --cont enbrel \par --drug monitoring labs prior to next visit \par --fibromyalgia improved and he self d/c cymbalta and gabapentin  \par --uptodate with covid vaccine, flu and prevnar \par --will monitor for recurrence of LAD \par --abnormal  ECHO with possible pHTN.  patient to see cardiology \par \par
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
## 2023-06-19 ENCOUNTER — RX RENEWAL (OUTPATIENT)
Age: 53
End: 2023-06-19

## 2023-06-19 NOTE — PHYSICAL THERAPY INITIAL EVALUATION ADULT - LEVEL OF INDEPENDENCE: SUPINE/SIT, REHAB EVAL
supervision Render Risk Assessment In Note?: no Additional Notes: F/u 3 months to recheck right medial superior forehead (adjacent to SK) and left FA lesions. Detail Level: Simple

## 2023-07-10 ENCOUNTER — APPOINTMENT (OUTPATIENT)
Dept: PULMONOLOGY | Facility: CLINIC | Age: 53
End: 2023-07-10
Payer: COMMERCIAL

## 2023-07-10 VITALS
HEIGHT: 72 IN | WEIGHT: 314 LBS | HEART RATE: 84 BPM | RESPIRATION RATE: 16 BRPM | DIASTOLIC BLOOD PRESSURE: 80 MMHG | SYSTOLIC BLOOD PRESSURE: 138 MMHG | OXYGEN SATURATION: 96 % | BODY MASS INDEX: 42.53 KG/M2

## 2023-07-10 PROCEDURE — 99204 OFFICE O/P NEW MOD 45 MIN: CPT

## 2023-07-10 NOTE — REVIEW OF SYSTEMS
[Nasal Congestion] : nasal congestion [SOB on Exertion] : sob on exertion [Seasonal Allergies] : seasonal allergies [GERD] : gerd [Obesity] : obesity [Negative] : Psychiatric

## 2023-07-10 NOTE — HISTORY OF PRESENT ILLNESS
[Initial Evaluation - Existing Diagnosis] : an initial evaluation of an existing diagnosis of [None] : No associated symptoms are reported [CPAP] : CPAP [Good Symptom Control] : good symptom control [Initial Evaluation] : an initial evaluation of [Excess Weight] : excess weight [Dyspnea] : dyspnea [Joint Pain] : joint pain [Low Calorie Diet] : low calorie diet [Exercise Regimen] : exercise regimen [Good Compliance] : good compliance with treatment [Good Tolerance] : good tolerance of treatment [Fair Symptom Control] : fair symptom control [Sleep Apnea] : sleep apnea [High] : high [Low Calorie] : low calorie [Well Balanced Diet] : well balanced meals [Low Carb Diet] : low carbohydrate food choices [TextBox_4] : Former smoker 1 ppw x 3-4 years, quit 2005\par S/p smoking medical marijuana and then used edibles.\par S/p Covid infection 2021 with mild symptoms and did not require therapy.\par The patient reports intermittent shortness of breath, particularly with exertion. 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 He is otherwise without other chronic respiratory complaints. \par He is trying to lose weight though admits to gaining weight\par Pt last seen in 2015.\par  [de-identified] : Auto titrating machine

## 2023-07-10 NOTE — CONSULT LETTER
[Bal Kapoor MD] : Bal Kapoor MD [Dear  ___] : Dear  [unfilled], [Consult Letter:] : I had the pleasure of evaluating your patient, [unfilled]. [Please see my note below.] : Please see my note below. [Consult Closing:] : Thank you very much for allowing me to participate in the care of this patient.  If you have any questions, please do not hesitate to contact me. [Sincerely,] : Sincerely, [FreeTextEntry3] : Bal Kapoor MD, FCCP, D. ABSM\par Pulmonary and Sleep Medicine\par [facility] Pulmonary and Sleep [facility] at [place]

## 2023-07-10 NOTE — DISCUSSION/SUMMARY
[FreeTextEntry1] : \par #1. Diet and exercise for weight loss\par #2. Continue autoCPAP for treatment of his severe PORTILLO; changed pressure to 10-20 cm of water given residual mild PORTILLO of 6.3 on lower pressures\par #3. Nasonex for possible PNDS\par #4. Consider ENT evaluation\par #5. CXR from 11/20/14 is negative by report; will repeat\par #6. Consider cardiology evaluation to further w/u his SOB if needed though patient reports improvement with weight loss \par #7. F/u in 3 months with compliance, CXR, and PFTs\par #8. Replace equipment as needed; ordered 7/10/23\par #9. Pt had both Covid vaccines; s/p Covid infection\par \par The patient expressed understanding and agreement with the above recommendations/plan and accepts responsibility to be compliant with recommended testing, therapies, and f/u visits.\par All relevant questions and concerns were addressed.

## 2023-07-10 NOTE — REASON FOR VISIT
[Initial] : an initial visit [Sleep Apnea] : sleep apnea [Shortness of Breath] : shortness of breath [Obesity] : obesity [TextBox_44] : Prior Covid infection

## 2023-07-10 NOTE — PHYSICAL EXAM
[No Acute Distress] : no acute distress [Low Lying Soft Palate] : low lying soft palate [Elongated Uvula] : elongated uvula [Enlarged Base of the Tongue] : enlarged base of the tongue [III] : Mallampati Class: III [3+] : Right Tonsil: 3+ [Normal Appearance] : normal appearance [Supple] : supple [Normal Rate/Rhythm] : normal rate/rhythm [Normal S1, S2] : normal s1, s2 [No Murmurs] : no murmurs [Floppy] : a floppy soft palate [Redundant] : redundant mucosal folds [Macroglossia] : was enlarged (macroglossia) [Absent] : not absent [No Resp Distress] : no resp distress [No Acc Muscle Use] : no acc muscle use [Normal Rhythm and Effort] : normal rhythm and effort [Clear to Auscultation Bilaterally] : clear to auscultation bilaterally [No Abnormalities] : no abnormalities [Benign] : benign [Not Tender] : not tender [Soft] : soft [No Clubbing] : no clubbing [No Edema] : no edema [No Focal Deficits] : no focal deficits [Oriented x3] : oriented x3 [TextBox_11] : Moderate to severe oropharyngeal crowding.

## 2023-07-18 ENCOUNTER — NON-APPOINTMENT (OUTPATIENT)
Age: 53
End: 2023-07-18

## 2023-07-18 LAB
CHOLEST SERPL-MCNC: 94 MG/DL
HDLC SERPL-MCNC: 33 MG/DL
LDLC SERPL CALC-MCNC: 47 MG/DL
NONHDLC SERPL-MCNC: 61 MG/DL
TRIGL SERPL-MCNC: 60 MG/DL

## 2023-07-21 ENCOUNTER — APPOINTMENT (OUTPATIENT)
Dept: CARDIOLOGY | Facility: CLINIC | Age: 53
End: 2023-07-21
Payer: COMMERCIAL

## 2023-07-21 ENCOUNTER — NON-APPOINTMENT (OUTPATIENT)
Age: 53
End: 2023-07-21

## 2023-07-21 VITALS
BODY MASS INDEX: 42.53 KG/M2 | OXYGEN SATURATION: 97 % | DIASTOLIC BLOOD PRESSURE: 68 MMHG | SYSTOLIC BLOOD PRESSURE: 140 MMHG | HEIGHT: 72 IN | HEART RATE: 82 BPM | TEMPERATURE: 97.6 F | WEIGHT: 314 LBS

## 2023-07-21 DIAGNOSIS — I28.8 OTHER DISEASES OF PULMONARY VESSELS: ICD-10-CM

## 2023-07-21 PROCEDURE — 93000 ELECTROCARDIOGRAM COMPLETE: CPT

## 2023-07-21 PROCEDURE — 99214 OFFICE O/P EST MOD 30 MIN: CPT

## 2023-07-21 NOTE — PHYSICAL EXAM

## 2023-07-21 NOTE — DISCUSSION/SUMMARY
[FreeTextEntry1] : 52M h/o morbid obesity (BMI 42), PORTILLO/CPAP, fatty liver, HTN, psoriatic arthritis, anxiety/depression, GERD, BPH, lung nodule with recent outside repeat CT chest done at [facility] in 10/2022 with incidental reports of prominent caliber of the main pulmonary artery (3.3 cm) concerning for pulmonary hypertension (prior CT chest 2021 without mention of), refer for cardiology evaluation seen on 2/2023, outside Echo done LV EF 63%, reported dilated pulmonary artery but without measurement of size but with ormal RV size/function, no estimated RVSP, no actual evidence of pulmonary HTN, started atorvastatin 20mg for coronary artery calcifications ( last year), returns for follow up. \par \par No clinical signs of pulmonary hypertension, suspect prominent PA likely normal index to patient's body BSA (2.58) and h/o PORTILLO. EKG within normal, no exertional complains. Suspect prior dyspnea due to deconditioning with morbid obesity, unable to lose weight, will start Ozempic if insurance would cover. \par \par \par 1. Prominent pulmonary artery- likley normal per body size, asymptomatic, no further cardiac testing indicated for now. \par \par 2. HTN- at goal on losartan. \par \par 3. Psoriatic arthritis- on MTX and biologic\par \par 4. Morbid obesity- advised dieting and weight loss; start Ozempic if not cover then try Mounjaro, considering bariatric surgery in the future. \par \par 5. Coronary artery calcifications- continue statin; defer repeat stress testing as asymptomatic and had prior stress in 2020. \par \par Follow up in 6 months.  [EKG obtained to assist in diagnosis and management of assessed problem(s)] : EKG obtained to assist in diagnosis and management of assessed problem(s)

## 2023-07-21 NOTE — CARDIOLOGY SUMMARY
[de-identified] : 2/3/23- Sinus 71, low voltage precordial, no ST changes, QTc 381\par 7/21/23- Sinus 75, normal axis, no ST abnormality, QTc 400 [de-identified] : outside study 10/2020- PET stress test done in 10/2020 without ischemia/infarct [de-identified] : outside study 10/2020- Echo done at that time also with LV EF 59%, normal RV size/function with RVSP 16 mmHg\par 5/22/23- outside Echo LV EF 63%, shey biventricular systolic function

## 2023-07-21 NOTE — HISTORY OF PRESENT ILLNESS
[FreeTextEntry1] : 52M h/o morbid obesity (BMI 42), PORTILLO/CPAP, fatty liver, HTN, psoriatic arthritis, anxiety/depression, GERD, BPH, lung nodule with recent outside repeat CT chest done at [facility] in 10/2022 with incidental reports of prominent caliber of the main pulmonary artery (3.3 cm) concerning for pulmonary hypertension (prior CT chest 2021 without mention of), refer for cardiology evaluation seen on 2/2023, outside Echo done LV EF 63%, reported dilated pulmonary artery but without measurement of size but with ormal RV size/function, no estimated RVSP, no actual evidence of pulmonary HTN, started atorvastatin 20mg for coronary artery calcifications ( last year), returns for follow up. \par \par Reports feeling well, denies chest pain or shortness of breath seen by pulmonary pending PFT, not able to exercise due to joint pain but able to ride the [place] in the city for about 20 minutes, works in construction. He wants to lose weight but want to try medication before considering surgery. \par \par \par Prior visit 2/2023: \par Patient presents with his wife for the visit. \par Denies chest pain, reports exertional dyspnea after returned to work prevoiusly been off for 5 months for knee replacement, since then been ambulating better but has not start any exercise routine, getting physical therapy. \par \par Previously had to been [facility] cardiology seen by Dr. Flores with symptoms of chest tightness of breath, PET stress test done in 10/2020 without ischemia/infarct; Echo done at that time also with LV EF 59%, normal RV size/function with RVSP 16 mmHg\par \par Lipid panel 5/2022: , HDL 38, ; A1c 6.2%\par \par Former smoker, social alcohol\par Aunt with stroke

## 2023-08-07 ENCOUNTER — APPOINTMENT (OUTPATIENT)
Dept: INTERNAL MEDICINE | Facility: CLINIC | Age: 53
End: 2023-08-07
Payer: COMMERCIAL

## 2023-08-07 ENCOUNTER — LABORATORY RESULT (OUTPATIENT)
Age: 53
End: 2023-08-07

## 2023-08-07 VITALS
DIASTOLIC BLOOD PRESSURE: 82 MMHG | HEIGHT: 72 IN | RESPIRATION RATE: 16 BRPM | WEIGHT: 315 LBS | HEART RATE: 83 BPM | BODY MASS INDEX: 42.66 KG/M2 | OXYGEN SATURATION: 98 % | TEMPERATURE: 97.9 F | SYSTOLIC BLOOD PRESSURE: 130 MMHG
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
## 2023-08-07 DIAGNOSIS — S80.862A INSECT BITE (NONVENOMOUS), LEFT LOWER LEG, INITIAL ENCOUNTER: ICD-10-CM

## 2023-08-07 DIAGNOSIS — N53.14 RETROGRADE EJACULATION: ICD-10-CM

## 2023-08-07 PROCEDURE — 99396 PREV VISIT EST AGE 40-64: CPT | Mod: 25

## 2023-08-07 PROCEDURE — 36415 COLL VENOUS BLD VENIPUNCTURE: CPT

## 2023-08-07 NOTE — REVIEW OF SYSTEMS
[Negative] : Psychiatric [Fever] : no fever [Chills] : no chills [Fatigue] : no fatigue [Chest Pain] : no chest pain [Palpitations] : no palpitations [Lower Ext Edema] : no lower extremity edema [Shortness Of Breath] : no shortness of breath [Wheezing] : no wheezing [Cough] : no cough [Dyspnea on Exertion] : not dyspnea on exertion [Abdominal Pain] : no abdominal pain [Nausea] : no nausea [Diarrhea] : no diarrhea [Vomiting] : no vomiting [FreeTextEntry8] : See HPI

## 2023-08-07 NOTE — PHYSICAL EXAM
[No Acute Distress] : no acute distress [Well-Appearing] : well-appearing [Normal Voice/Communication] : normal voice/communication [Normal Sclera/Conjunctiva] : normal sclera/conjunctiva [PERRL] : pupils equal round and reactive to light [Normal Oropharynx] : the oropharynx was normal [No JVD] : no jugular venous distention [No Lymphadenopathy] : no lymphadenopathy [Supple] : supple [No Respiratory Distress] : no respiratory distress  [No Accessory Muscle Use] : no accessory muscle use [Clear to Auscultation] : lungs were clear to auscultation bilaterally [Normal Rate] : normal rate  [Regular Rhythm] : with a regular rhythm [Normal S1, S2] : normal S1 and S2 [No Murmur] : no murmur heard [No Edema] : there was no peripheral edema [No Rash] : no rash [No Focal Deficits] : no focal deficits [Normal Affect] : the affect was normal [Alert and Oriented x3] : oriented to person, place, and time [Normal Mood] : the mood was normal [Normal Insight/Judgement] : insight and judgment were intact [EOMI] : extraocular movements intact [Normal Outer Ear/Nose] : the outer ears and nose were normal in appearance [Normal TMs] : both tympanic membranes were normal [Normal Nasal Mucosa] : the nasal mucosa was normal [No Carotid Bruits] : no carotid bruits [Soft] : abdomen soft [Non Tender] : non-tender [Non-distended] : non-distended [No Masses] : no abdominal mass palpated [No HSM] : no HSM [Normal Bowel Sounds] : normal bowel sounds [No Spinal Tenderness] : no spinal tenderness [No Joint Swelling] : no joint swelling [No Skin Lesions] : no skin lesions [de-identified] : No calf tenderness

## 2023-08-07 NOTE — HEALTH RISK ASSESSMENT
[No] : In the past 12 months have you used drugs other than those required for medical reasons? No [0] : 2) Feeling down, depressed, or hopeless: Not at all (0) [PHQ-2 Negative - No further assessment needed] : PHQ-2 Negative - No further assessment needed [HIV Test offered] : HIV Test offered [Former] : Former [0-4] : 0-4 [> 15 Years] : > 15 Years [[id number]] : 0

## 2023-08-07 NOTE — HISTORY OF PRESENT ILLNESS
[de-identified] : Here today for CPE  He states he was recently seen by cardiology who started him on atorvastatin.  He states ever since he started atorvastatin he states he cannot ejaculate when he is sexually active.  He states when he ejaculates nothing comes out.  He has an appointment to see his urologist in 1 month.  He also reports that he had a deer tick bite in early July 2022.  He states he wants a Lyme disease test.  He denies any new fatigue, out of the ordinary joint pain, rash, headaches.  He states he had tick bite on his lower leg

## 2023-08-07 NOTE — ASSESSMENT
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 -I suspect this is due to Flomax but he has been on Flomax for a long time without any issues -He states this started when he started atorvastatin -I have advised he hold atorvastatin for 1 week and monitor symptoms -If symptoms persist after stopping atorvastatin then he can try holding Flomax for 1 to 2 days to see if this resolves his symptoms -If it is in fact his Flomax we can consider changing to alfuzosin which may have less retrograde ejaculation -He has an appointment to see his urologist next month  Tick bite -Check Lyme test today  Depression/anxiety: -No depression or anxiety reported today -PHQ 2 score was 0 -He stopped taking his Cymbalta  Psoriatic arthritis/OA -He is followed by rheumatology and dermatology -He is now on Enbrel  Psoriasis -clobestasol cream prn -Followed by dermatology  Lung nodule: -He had CT of chest 2022 which showed a stable right lower lobe nodule dating back to 2019. -His CT chest showed prominent caliber of the main pulmonary artery which may be associated with pulmonary hypertension -He was seen by cardiology who did not feel he had pulmonary hypertension.  Cardiology thought CT finding likely represented normal variant given patient's body habitus -He states pulmonary recently ordered chest x-ray  HTN: -BP at goal  -losartan 50mg PO daily  Coronary artery calcifications -Cardiologist recently started him on atorvastatin Lipids at goal 2023 -check fasting labs to be done at lab in 2 months  Obesity/Pre-DM: -HgA1c 6.2 2022-check hemoglobin A1c with next labs in 3 months -he should adhere to low fat/low cholesterol diet, low carbohydrate diet and weight loss advised  PORTILLO: -on CPAP-followed by pulmonary  Vitamin D Deficiency: -OTC vitamin D3 1000 units once a day -Check vitamin D 25 OH with next labs  Elevated LFTs/fatty liver: -Recent LFTs were normal -hepatitis B and c serologies were negative -Weight loss advised  BPH: -Sees urologist -On Flomax -Check PSA with next labs  HCM:  CPE 2023  EK2023 with cardiologist  Flu shot: 10/25/2022-advised flu shot in the fall  Tdap: 2018  Pneumovax: 2022   Prevnar 20 advised R/B discussed.He left office before getting prevnar 20-will give at next visit  Covid vaccine (Pfizer x 2-Moderna booster)-covid 19 bivalent booster advised previously  Shingles vaccine advised previously  HIV testing: negative 2023.  HIV testing offered.  2023 he states he will have done with next labs which was ordered today  Hepatitis C screening: negative 2023  PSA: 0.70 2022-check PSA  Depression screenin2023 PHQ 2 score 0  Colonoscopy: 2021  QuantiFERON-TB test 2023 negative  F/U 3 to 4 months.  Lyme disease test ordered and drawn in office today.  Fasting annual labs ordered which she will have done at the lab in 2 months

## 2023-08-09 NOTE — PHYSICAL THERAPY INITIAL EVALUATION ADULT - LEVEL OF CONSCIOUSNESS, REHAB EVAL
Secondary to non-compliance with HD, last HD session was 8/4/23, scheduled for M,W,F    --HD completed in ED  --Repeat Labs in AM     alert

## 2023-08-13 LAB
25(OH)D3 SERPL-MCNC: 20.3 NG/ML
ALBUMIN SERPL ELPH-MCNC: 4.7 G/DL
ALP BLD-CCNC: 66 U/L
ALT SERPL-CCNC: 35 U/L
ANION GAP SERPL CALC-SCNC: 16 MMOL/L
AST SERPL-CCNC: 30 U/L
BILIRUB SERPL-MCNC: 0.6 MG/DL
BUN SERPL-MCNC: 15 MG/DL
CALCIUM SERPL-MCNC: 9.7 MG/DL
CHLORIDE SERPL-SCNC: 97 MMOL/L
CHOLEST SERPL-MCNC: 116 MG/DL
CO2 SERPL-SCNC: 25 MMOL/L
CREAT SERPL-MCNC: 1.01 MG/DL
EGFR: 89 ML/MIN/1.73M2
GLUCOSE SERPL-MCNC: 93 MG/DL
HDLC SERPL-MCNC: 35 MG/DL
HIV1+2 AB SPEC QL IA.RAPID: NONREACTIVE
LDLC SERPL CALC-MCNC: 64 MG/DL
NONHDLC SERPL-MCNC: 80 MG/DL
POTASSIUM SERPL-SCNC: 4.3 MMOL/L
PROT SERPL-MCNC: 7.4 G/DL
PSA SERPL-MCNC: 0.61 NG/ML
SODIUM SERPL-SCNC: 138 MMOL/L
T4 FREE SERPL-MCNC: 1.1 NG/DL
TRIGL SERPL-MCNC: 86 MG/DL
TSH SERPL-ACNC: 1.44 UIU/ML

## 2023-08-14 ENCOUNTER — APPOINTMENT (OUTPATIENT)
Dept: FAMILY MEDICINE | Facility: CLINIC | Age: 53
End: 2023-08-14
Payer: COMMERCIAL

## 2023-08-14 ENCOUNTER — MED ADMIN CHARGE (OUTPATIENT)
Age: 53
End: 2023-08-14

## 2023-08-14 PROCEDURE — G0009: CPT

## 2023-08-14 PROCEDURE — 90677 PCV20 VACCINE IM: CPT

## 2023-08-21 ENCOUNTER — RX RENEWAL (OUTPATIENT)
Age: 53
End: 2023-08-21

## 2023-08-22 RX ORDER — SEMAGLUTIDE 0.68 MG/ML
2 INJECTION, SOLUTION SUBCUTANEOUS
Qty: 1 | Refills: 3 | Status: DISCONTINUED | COMMUNITY
Start: 2023-07-21 | End: 2023-08-22

## 2023-09-13 NOTE — DISCHARGE NOTE PROVIDER - NSDCHHWEIGHT_GEN_ALL_CORE
LVM, no mychart to schedule the following appointment:    Next available Microscopy with Theresa Narayan, put HRA in notes. Left K pod #  
Yes

## 2023-09-18 ENCOUNTER — NON-APPOINTMENT (OUTPATIENT)
Age: 53
End: 2023-09-18

## 2023-09-18 ENCOUNTER — RX RENEWAL (OUTPATIENT)
Age: 53
End: 2023-09-18

## 2023-09-25 ENCOUNTER — APPOINTMENT (OUTPATIENT)
Dept: PULMONOLOGY | Facility: CLINIC | Age: 53
End: 2023-09-25
Payer: COMMERCIAL

## 2023-09-25 VITALS — HEART RATE: 81 BPM | SYSTOLIC BLOOD PRESSURE: 130 MMHG | DIASTOLIC BLOOD PRESSURE: 64 MMHG | OXYGEN SATURATION: 94 %

## 2023-09-25 VITALS — WEIGHT: 315 LBS | HEIGHT: 72 IN | BODY MASS INDEX: 42.66 KG/M2

## 2023-09-25 DIAGNOSIS — Z87.891 PERSONAL HISTORY OF NICOTINE DEPENDENCE: ICD-10-CM

## 2023-09-25 DIAGNOSIS — R91.1 SOLITARY PULMONARY NODULE: ICD-10-CM

## 2023-09-25 PROCEDURE — 94010 BREATHING CAPACITY TEST: CPT

## 2023-09-25 PROCEDURE — 99214 OFFICE O/P EST MOD 30 MIN: CPT | Mod: 25

## 2023-09-25 PROCEDURE — 94729 DIFFUSING CAPACITY: CPT

## 2023-09-25 PROCEDURE — 94727 GAS DIL/WSHOT DETER LNG VOL: CPT

## 2023-09-25 PROCEDURE — 85018 HEMOGLOBIN: CPT | Mod: QW

## 2023-10-01 PROBLEM — Z79.899 MEDICAL CANNABIS USE: Status: ACTIVE | Noted: 2022-08-28

## 2023-10-19 ENCOUNTER — APPOINTMENT (OUTPATIENT)
Dept: RHEUMATOLOGY | Facility: CLINIC | Age: 53
End: 2023-10-19
Payer: COMMERCIAL

## 2023-10-19 VITALS
BODY MASS INDEX: 42.66 KG/M2 | OXYGEN SATURATION: 97 % | TEMPERATURE: 98.2 F | RESPIRATION RATE: 17 BRPM | SYSTOLIC BLOOD PRESSURE: 134 MMHG | WEIGHT: 315 LBS | DIASTOLIC BLOOD PRESSURE: 80 MMHG | HEIGHT: 72 IN | HEART RATE: 75 BPM

## 2023-10-19 DIAGNOSIS — M25.511 PAIN IN RIGHT SHOULDER: ICD-10-CM

## 2023-10-19 DIAGNOSIS — M25.512 PAIN IN RIGHT SHOULDER: ICD-10-CM

## 2023-10-19 DIAGNOSIS — G56.03 CARPAL TUNNEL SYNDROM,BILATERAL UPPER LIMBS: ICD-10-CM

## 2023-10-19 LAB
ALBUMIN SERPL ELPH-MCNC: 4.6 G/DL
ALP BLD-CCNC: 65 U/L
ALT SERPL-CCNC: 46 U/L
ANION GAP SERPL CALC-SCNC: 8 MMOL/L
AST SERPL-CCNC: 32 U/L
BILIRUB SERPL-MCNC: 0.6 MG/DL
BUN SERPL-MCNC: 11 MG/DL
CALCIUM SERPL-MCNC: 9.7 MG/DL
CHLORIDE SERPL-SCNC: 100 MMOL/L
CO2 SERPL-SCNC: 30 MMOL/L
CREAT SERPL-MCNC: 0.91 MG/DL
CRP SERPL-MCNC: <3 MG/L
EGFR: 101 ML/MIN/1.73M2
ERYTHROCYTE [SEDIMENTATION RATE] IN BLOOD BY WESTERGREN METHOD: 3 MM/HR
GLUCOSE SERPL-MCNC: 123 MG/DL
POTASSIUM SERPL-SCNC: 4 MMOL/L
PROT SERPL-MCNC: 7 G/DL
SODIUM SERPL-SCNC: 138 MMOL/L

## 2023-10-19 PROCEDURE — 99214 OFFICE O/P EST MOD 30 MIN: CPT

## 2023-10-20 ENCOUNTER — RX RENEWAL (OUTPATIENT)
Age: 53
End: 2023-10-20

## 2023-10-20 RX ORDER — OMEPRAZOLE 20 MG/1
20 CAPSULE, DELAYED RELEASE ORAL DAILY
Qty: 90 | Refills: 3 | Status: ACTIVE | COMMUNITY
Start: 2022-09-26 | End: 1900-01-01

## 2023-11-03 DIAGNOSIS — Z01.818 ENCOUNTER FOR OTHER PREPROCEDURAL EXAMINATION: ICD-10-CM

## 2023-11-06 ENCOUNTER — APPOINTMENT (OUTPATIENT)
Dept: SURGERY | Facility: CLINIC | Age: 53
End: 2023-11-06
Payer: COMMERCIAL

## 2023-11-06 VITALS
DIASTOLIC BLOOD PRESSURE: 78 MMHG | RESPIRATION RATE: 15 BRPM | WEIGHT: 315 LBS | SYSTOLIC BLOOD PRESSURE: 123 MMHG | HEART RATE: 70 BPM | BODY MASS INDEX: 44.59 KG/M2 | HEIGHT: 70.5 IN | TEMPERATURE: 97.2 F | OXYGEN SATURATION: 96 %

## 2023-11-06 DIAGNOSIS — Z01.818 ENCOUNTER FOR OTHER PREPROCEDURAL EXAMINATION: ICD-10-CM

## 2023-11-06 PROCEDURE — 99205 OFFICE O/P NEW HI 60 MIN: CPT

## 2023-11-20 ENCOUNTER — RX RENEWAL (OUTPATIENT)
Age: 53
End: 2023-11-20

## 2023-11-28 ENCOUNTER — APPOINTMENT (OUTPATIENT)
Dept: GASTROENTEROLOGY | Facility: CLINIC | Age: 53
End: 2023-11-28
Payer: COMMERCIAL

## 2023-11-28 VITALS
BODY MASS INDEX: 45.1 KG/M2 | OXYGEN SATURATION: 98 % | HEART RATE: 70 BPM | RESPIRATION RATE: 16 BRPM | DIASTOLIC BLOOD PRESSURE: 80 MMHG | HEIGHT: 70 IN | WEIGHT: 315 LBS | SYSTOLIC BLOOD PRESSURE: 130 MMHG

## 2023-11-28 PROCEDURE — 99204 OFFICE O/P NEW MOD 45 MIN: CPT

## 2023-11-29 LAB
25(OH)D3 SERPL-MCNC: 12.2 NG/ML
ALBUMIN SERPL ELPH-MCNC: 4.4 G/DL
ALP BLD-CCNC: 71 U/L
ALT SERPL-CCNC: 51 U/L
ANION GAP SERPL CALC-SCNC: 11 MMOL/L
APPEARANCE: CLEAR
APTT BLD: 31.2 SEC
AST SERPL-CCNC: 39 U/L
BACTERIA: NEGATIVE /HPF
BASOPHILS # BLD AUTO: 0.04 K/UL
BASOPHILS NFR BLD AUTO: 0.7 %
BILIRUB SERPL-MCNC: 0.7 MG/DL
BILIRUBIN URINE: NEGATIVE
BLOOD URINE: NEGATIVE
BUN SERPL-MCNC: 16 MG/DL
CALCIUM SERPL-MCNC: 9.8 MG/DL
CALCIUM SERPL-MCNC: 9.8 MG/DL
CAST: 0 /LPF
CHLORIDE SERPL-SCNC: 102 MMOL/L
CHOLEST SERPL-MCNC: 103 MG/DL
CO2 SERPL-SCNC: 27 MMOL/L
COLOR: YELLOW
CREAT SERPL-MCNC: 0.97 MG/DL
EGFR: 94 ML/MIN/1.73M2
EOSINOPHIL # BLD AUTO: 0.61 K/UL
EOSINOPHIL NFR BLD AUTO: 11.1 %
EPITHELIAL CELLS: 0 /HPF
FOLATE SERPL-MCNC: 12.2 NG/ML
GLUCOSE QUALITATIVE U: NEGATIVE MG/DL
GLUCOSE SERPL-MCNC: 127 MG/DL
HCT VFR BLD CALC: 42.3 %
HDLC SERPL-MCNC: 33 MG/DL
HGB BLD-MCNC: 14.3 G/DL
IMM GRANULOCYTES NFR BLD AUTO: 0.2 %
INR PPP: 1.06 RATIO
IRON SATN MFR SERPL: 29 %
IRON SERPL-MCNC: 90 UG/DL
KETONES URINE: NEGATIVE MG/DL
LDLC SERPL CALC-MCNC: 56 MG/DL
LEUKOCYTE ESTERASE URINE: NEGATIVE
LYMPHOCYTES # BLD AUTO: 1.87 K/UL
LYMPHOCYTES NFR BLD AUTO: 34.1 %
MAN DIFF?: NORMAL
MCHC RBC-ENTMCNC: 30.8 PG
MCHC RBC-ENTMCNC: 33.8 GM/DL
MCV RBC AUTO: 91 FL
MICROSCOPIC-UA: NORMAL
MONOCYTES # BLD AUTO: 0.52 K/UL
MONOCYTES NFR BLD AUTO: 9.5 %
NEUTROPHILS # BLD AUTO: 2.43 K/UL
NEUTROPHILS NFR BLD AUTO: 44.4 %
NITRITE URINE: NEGATIVE
NONHDLC SERPL-MCNC: 70 MG/DL
PARATHYROID HORMONE INTACT: 35 PG/ML
PH URINE: 7.5
PLATELET # BLD AUTO: 242 K/UL
POTASSIUM SERPL-SCNC: 4.6 MMOL/L
PREALB SERPL NEPH-MCNC: 21 MG/DL
PROT SERPL-MCNC: 7.2 G/DL
PROTEIN URINE: NEGATIVE MG/DL
PT BLD: 12 SEC
RBC # BLD: 4.65 M/UL
RBC # FLD: 13.4 %
RED BLOOD CELLS URINE: 1 /HPF
SODIUM SERPL-SCNC: 141 MMOL/L
SPECIFIC GRAVITY URINE: 1.02
TIBC SERPL-MCNC: 309 UG/DL
TRIGL SERPL-MCNC: 68 MG/DL
TSH SERPL-ACNC: 1.65 UIU/ML
UIBC SERPL-MCNC: 219 UG/DL
UROBILINOGEN URINE: 1 MG/DL
VIT B12 SERPL-MCNC: 546 PG/ML
WBC # FLD AUTO: 5.48 K/UL
WHITE BLOOD CELLS URINE: 0 /HPF

## 2023-11-30 LAB
CA-I SERPL-SCNC: 5.2 MG/DL
ESTIMATED AVERAGE GLUCOSE: 137 MG/DL
HBA1C MFR BLD HPLC: 6.4 %

## 2023-12-01 LAB — VIT B1 SERPL-MCNC: 164.9 NMOL/L

## 2023-12-04 ENCOUNTER — APPOINTMENT (OUTPATIENT)
Dept: BARIATRICS | Facility: CLINIC | Age: 53
End: 2023-12-04
Payer: COMMERCIAL

## 2023-12-04 ENCOUNTER — APPOINTMENT (OUTPATIENT)
Dept: INTERNAL MEDICINE | Facility: CLINIC | Age: 53
End: 2023-12-04
Payer: COMMERCIAL

## 2023-12-04 VITALS
TEMPERATURE: 98 F | DIASTOLIC BLOOD PRESSURE: 81 MMHG | HEIGHT: 70.5 IN | WEIGHT: 315 LBS | HEART RATE: 80 BPM | RESPIRATION RATE: 16 BRPM | BODY MASS INDEX: 44.59 KG/M2 | SYSTOLIC BLOOD PRESSURE: 130 MMHG | OXYGEN SATURATION: 96 %

## 2023-12-04 VITALS
TEMPERATURE: 98.3 F | OXYGEN SATURATION: 96 % | HEIGHT: 70.5 IN | SYSTOLIC BLOOD PRESSURE: 145 MMHG | DIASTOLIC BLOOD PRESSURE: 76 MMHG | RESPIRATION RATE: 16 BRPM | HEART RATE: 90 BPM | BODY MASS INDEX: 44.59 KG/M2 | WEIGHT: 315 LBS

## 2023-12-04 DIAGNOSIS — Z23 ENCOUNTER FOR IMMUNIZATION: ICD-10-CM

## 2023-12-04 DIAGNOSIS — L40.50 ARTHROPATHIC PSORIASIS, UNSPECIFIED: ICD-10-CM

## 2023-12-04 DIAGNOSIS — E66.01 MORBID (SEVERE) OBESITY DUE TO EXCESS CALORIES: ICD-10-CM

## 2023-12-04 LAB
A-TOCOPHEROL VIT E SERPL-MCNC: 6.1 MG/L
BETA+GAMMA TOCOPHEROL SERPL-MCNC: 0.5 MG/L
VIT A SERPL-MCNC: 34.2 UG/DL

## 2023-12-04 PROCEDURE — 99214 OFFICE O/P EST MOD 30 MIN: CPT | Mod: 25

## 2023-12-04 PROCEDURE — 90686 IIV4 VACC NO PRSV 0.5 ML IM: CPT

## 2023-12-04 PROCEDURE — G0008: CPT

## 2023-12-04 PROCEDURE — 97802 MEDICAL NUTRITION INDIV IN: CPT

## 2023-12-04 PROCEDURE — G0447 BEHAVIOR COUNSEL OBESITY 15M: CPT | Mod: 59

## 2023-12-04 RX ORDER — TRIAMCINOLONE ACETONIDE 1 MG/G
0.1 OINTMENT TOPICAL
Qty: 3 | Refills: 1 | Status: ACTIVE | COMMUNITY
Start: 2023-06-09 | End: 1900-01-01

## 2023-12-08 LAB
MENADIONE SERPL-MCNC: <0.1 NG/ML
ZINC SERPL-MCNC: 81 UG/DL

## 2023-12-09 PROBLEM — L40.50 PSORIATIC ARTHRITIS: Status: ACTIVE | Noted: 2021-07-12

## 2023-12-09 PROBLEM — E66.01 OBESITY, MORBID, BMI 40.0-49.9: Status: ACTIVE | Noted: 2023-12-09

## 2023-12-09 PROBLEM — Z23 INFLUENZA VACCINE NEEDED: Status: ACTIVE | Noted: 2019-10-12

## 2023-12-11 ENCOUNTER — APPOINTMENT (OUTPATIENT)
Dept: RHEUMATOLOGY | Facility: CLINIC | Age: 53
End: 2023-12-11
Payer: COMMERCIAL

## 2023-12-11 VITALS
HEART RATE: 86 BPM | TEMPERATURE: 97.8 F | SYSTOLIC BLOOD PRESSURE: 132 MMHG | DIASTOLIC BLOOD PRESSURE: 78 MMHG | OXYGEN SATURATION: 97 % | HEIGHT: 71 IN

## 2023-12-11 PROCEDURE — 99214 OFFICE O/P EST MOD 30 MIN: CPT

## 2023-12-18 ENCOUNTER — RX RENEWAL (OUTPATIENT)
Age: 53
End: 2023-12-18

## 2023-12-19 RX ORDER — LOSARTAN POTASSIUM 50 MG/1
50 TABLET, FILM COATED ORAL
Qty: 90 | Refills: 3 | Status: ACTIVE | COMMUNITY
Start: 2017-02-24 | End: 1900-01-01

## 2024-01-16 ENCOUNTER — RX RENEWAL (OUTPATIENT)
Age: 54
End: 2024-01-16

## 2024-01-16 RX ORDER — CELECOXIB 200 MG/1
200 CAPSULE ORAL
Qty: 60 | Refills: 11 | Status: ACTIVE | COMMUNITY
Start: 2022-10-14 | End: 1900-01-01

## 2024-01-22 ENCOUNTER — APPOINTMENT (OUTPATIENT)
Dept: CARDIOLOGY | Facility: CLINIC | Age: 54
End: 2024-01-22
Payer: COMMERCIAL

## 2024-01-22 ENCOUNTER — NON-APPOINTMENT (OUTPATIENT)
Age: 54
End: 2024-01-22

## 2024-01-22 ENCOUNTER — APPOINTMENT (OUTPATIENT)
Dept: BARIATRICS | Facility: CLINIC | Age: 54
End: 2024-01-22
Payer: COMMERCIAL

## 2024-01-22 VITALS
SYSTOLIC BLOOD PRESSURE: 132 MMHG | TEMPERATURE: 97.7 F | DIASTOLIC BLOOD PRESSURE: 77 MMHG | BODY MASS INDEX: 43.29 KG/M2 | HEIGHT: 70.5 IN | OXYGEN SATURATION: 96 % | WEIGHT: 305.8 LBS | RESPIRATION RATE: 16 BRPM | HEART RATE: 84 BPM

## 2024-01-22 VITALS
HEART RATE: 99 BPM | HEIGHT: 71 IN | WEIGHT: 305 LBS | BODY MASS INDEX: 42.7 KG/M2 | SYSTOLIC BLOOD PRESSURE: 110 MMHG | OXYGEN SATURATION: 96 % | DIASTOLIC BLOOD PRESSURE: 70 MMHG

## 2024-01-22 DIAGNOSIS — I25.10 ATHEROSCLEROTIC HEART DISEASE OF NATIVE CORONARY ARTERY W/OUT ANGINA PECTORIS: ICD-10-CM

## 2024-01-22 PROCEDURE — 93000 ELECTROCARDIOGRAM COMPLETE: CPT

## 2024-01-22 PROCEDURE — 99214 OFFICE O/P EST MOD 30 MIN: CPT

## 2024-01-22 PROCEDURE — 97803 MED NUTRITION INDIV SUBSEQ: CPT

## 2024-02-05 ENCOUNTER — APPOINTMENT (OUTPATIENT)
Dept: BARIATRICS/WEIGHT MGMT | Facility: CLINIC | Age: 54
End: 2024-02-05

## 2024-02-12 ENCOUNTER — APPOINTMENT (OUTPATIENT)
Dept: BARIATRICS | Facility: CLINIC | Age: 54
End: 2024-02-12
Payer: COMMERCIAL

## 2024-02-12 VITALS
HEIGHT: 70.5 IN | RESPIRATION RATE: 16 BRPM | WEIGHT: 295.6 LBS | TEMPERATURE: 98.4 F | OXYGEN SATURATION: 97 % | SYSTOLIC BLOOD PRESSURE: 114 MMHG | BODY MASS INDEX: 41.85 KG/M2 | DIASTOLIC BLOOD PRESSURE: 69 MMHG | HEART RATE: 84 BPM

## 2024-02-12 PROCEDURE — 97803 MED NUTRITION INDIV SUBSEQ: CPT

## 2024-02-19 ENCOUNTER — RX RENEWAL (OUTPATIENT)
Age: 54
End: 2024-02-19

## 2024-02-27 ENCOUNTER — APPOINTMENT (OUTPATIENT)
Dept: UROLOGY | Facility: CLINIC | Age: 54
End: 2024-02-27
Payer: COMMERCIAL

## 2024-02-27 VITALS
OXYGEN SATURATION: 99 % | SYSTOLIC BLOOD PRESSURE: 127 MMHG | HEIGHT: 72 IN | DIASTOLIC BLOOD PRESSURE: 83 MMHG | BODY MASS INDEX: 39.01 KG/M2 | WEIGHT: 288 LBS | HEART RATE: 94 BPM

## 2024-02-27 DIAGNOSIS — Z87.442 PERSONAL HISTORY OF URINARY CALCULI: ICD-10-CM

## 2024-02-27 DIAGNOSIS — N32.81 OVERACTIVE BLADDER: ICD-10-CM

## 2024-02-27 DIAGNOSIS — N40.0 BENIGN PROSTATIC HYPERPLASIA WITHOUT LOWER URINARY TRACT SYMPMS: ICD-10-CM

## 2024-02-27 PROCEDURE — 99214 OFFICE O/P EST MOD 30 MIN: CPT

## 2024-02-27 RX ORDER — SOLIFENACIN SUCCINATE 5 MG/1
5 TABLET ORAL
Qty: 90 | Refills: 3 | Status: ACTIVE | COMMUNITY
Start: 2021-10-19 | End: 1900-01-01

## 2024-02-27 RX ORDER — TAMSULOSIN HYDROCHLORIDE 0.4 MG/1
0.4 CAPSULE ORAL DAILY
Qty: 90 | Refills: 3 | Status: ACTIVE | COMMUNITY
Start: 2017-04-07 | End: 1900-01-01

## 2024-02-27 NOTE — ASSESSMENT
[FreeTextEntry1] : 02/24/2024: Renal and bladder ultrasound, 02/21/2024: no evidence of heteronephrosis, contour deforming mass or calculus.  Pre-void volume: 456 cc Post-void volume: 71ml Prostate volume: 47ml.  Will continue Flomax and Sulfasalazine. No kidney stone was seen on this ultrasound. Continue dietary modification.   Return to office in 1 year or sooner if any issues.   Reviewed records. Discussed labs and imaging.     Return to office in 1 year or sooner if any issues.  .

## 2024-02-27 NOTE — END OF VISIT
[Time Spent: ___ minutes] : I have spent [unfilled] minutes of time on the encounter. [FreeTextEntry3] : Documented by Kaylee Baez acting as a scribe for Dr. Kavon Medrano. 02/27/2024. All medical record entries made by the Scribe were at my, Dr. Kavon Medrano, direction and personally dictated by me on 02/27/2024. I have reviewed the chart and agree that the record accurately reflects my personal performance of the history, physical exam, assessment and plan. I have also personally directed, reviewed, and agreed with the chart.

## 2024-02-27 NOTE — HISTORY OF PRESENT ILLNESS
[FreeTextEntry1] : 53 years old male presents for follow up.  Has had 60-pound weight loss with Zepbound out of pocket through weight loss Program: Dr Senior.  Taking Flomax and Solifenacin 5 mg daily. Urinates with reasonable stream, every 2 to 3 hours or so during the day and nocturia 1 to 2 x. Denies hesitancy, straining, intermittency, urgency, incontinence or sense of incomplete emptying.  No longer has post void dribbling.  Denies dysuria, hematuria, lower abdominal or flank pain, nausea, vomiting, fever, chills or rigors.  No longer has bilateral testis pain.  Normal libido, erections and ejaculation.  Status post right knee replacement in August 2022.  On Humira since last summer. Psoriatic arthritis.   CT scan (11/21): 5 mm left upper pole kidney stone.    Status post cystoscopy and left ureteral stent removal 7/31/19. Status post left ureteroscopy, laser lithotripsy, stone extraction and ureteral stent exchange at [facility] on 7/19/19.  Status post Left ESWL on 3/15/19 at [facility].   Initially seen for Kidney stone.  Had seen another Urologist in the past and underwent Cystoscopy for Microhematuria and Prostate biopsy for Prostate nodule.

## 2024-03-03 PROBLEM — N40.0 BPH WITHOUT OBSTRUCTION/LOWER URINARY TRACT SYMPTOMS: Status: ACTIVE | Noted: 2017-04-07

## 2024-03-03 PROBLEM — N32.81 OVERACTIVE BLADDER: Status: ACTIVE | Noted: 2021-04-13

## 2024-03-03 PROBLEM — Z87.442 HISTORY OF KIDNEY STONES: Status: ACTIVE | Noted: 2020-06-25

## 2024-03-05 ENCOUNTER — APPOINTMENT (OUTPATIENT)
Dept: INTERNAL MEDICINE | Facility: CLINIC | Age: 54
End: 2024-03-05
Payer: COMMERCIAL

## 2024-03-05 VITALS
RESPIRATION RATE: 16 BRPM | HEIGHT: 72 IN | WEIGHT: 286 LBS | BODY MASS INDEX: 38.74 KG/M2 | HEART RATE: 93 BPM | OXYGEN SATURATION: 96 % | DIASTOLIC BLOOD PRESSURE: 84 MMHG | TEMPERATURE: 97.4 F | SYSTOLIC BLOOD PRESSURE: 131 MMHG

## 2024-03-05 DIAGNOSIS — R79.89 OTHER SPECIFIED ABNORMAL FINDINGS OF BLOOD CHEMISTRY: ICD-10-CM

## 2024-03-05 DIAGNOSIS — E55.9 VITAMIN D DEFICIENCY, UNSPECIFIED: ICD-10-CM

## 2024-03-05 PROCEDURE — 99214 OFFICE O/P EST MOD 30 MIN: CPT

## 2024-03-05 PROCEDURE — 36415 COLL VENOUS BLD VENIPUNCTURE: CPT

## 2024-03-05 RX ORDER — ERGOCALCIFEROL 1.25 MG/1
50000 CAPSULE ORAL
Qty: 12 | Refills: 0 | Status: DISCONTINUED | COMMUNITY
Start: 2023-11-29 | End: 2024-03-05

## 2024-03-05 RX ORDER — ATORVASTATIN CALCIUM 20 MG/1
20 TABLET, FILM COATED ORAL
Qty: 1 | Refills: 1 | Status: ACTIVE | COMMUNITY
Start: 2023-07-05 | End: 1900-01-01

## 2024-03-05 RX ORDER — DULOXETINE HYDROCHLORIDE 60 MG/1
60 CAPSULE, DELAYED RELEASE ORAL DAILY
Refills: 0 | Status: ACTIVE | COMMUNITY

## 2024-03-05 RX ORDER — TIRZEPATIDE 7.5 MG/.5ML
7.5 INJECTION, SOLUTION SUBCUTANEOUS
Refills: 0 | Status: ACTIVE | COMMUNITY

## 2024-03-05 RX ORDER — SEMAGLUTIDE 0.25 MG/.5ML
0.25 INJECTION, SOLUTION SUBCUTANEOUS
Qty: 1 | Refills: 0 | Status: DISCONTINUED | COMMUNITY
Start: 2023-12-04 | End: 2024-03-05

## 2024-03-05 NOTE — HISTORY OF PRESENT ILLNESS
[FreeTextEntry1] : f/u [de-identified] : SERGIO FERRO is a 53 year old male with PMH HTN, predm, obesity, psoriatic arthritis, PORTILLO on CPAP, vitamin d def, fatty liver, BPH, presenting for f/u.   states he is now on zepbound 7.5mg weekly, phentermine 15mg once daily and topiramate 25mg BID. [facility] from [facility]. states he is trying to lose weight with medications first to avoid having surgery.

## 2024-03-05 NOTE — ASSESSMENT
[FreeTextEntry1] : Physical Exam: Constitutional: No acute distress, well appearing HEENT: Normocephalic, atraumatic Neck: supple Cardiac:  Regular rate and rhythm, No murmurs Pulmonary: No respiratory distress, Lungs clear to auscultation bilaterally, no wheezing, rales, or rhonchi Abdomen: Soft, non-tender, non-distended, no guarding, normal bowel sounds Vascular: No peripheral edema Neurology: Coordination grossly intact, no focal deficits Psychiatric: Alert and oriented x3, normal mood     A/P: Psoriatic arthritis/OA -He is followed by rheumatology and dermatology -on Enbrel  Lung nodule: -He had CT of chest 11/2022 which showed a stable right lower lobe nodule dating back to 2019. -His CT chest showed prominent caliber of the main pulmonary artery which may be associated with pulmonary hypertension -He was seen by cardiology who did not feel he had pulmonary hypertension. Cardiology thought CT finding likely represented normal variant given patient's body habitus pulm Dr Kapoor. last Cheste CT from 10/200 showing stable RLL nodule going back to 2019 therefore no further imaging studies are required  HTN: -BP near goal today at 131/84 -losartan 50mg PO daily - f/u 3 months or sooner as needed  Coronary artery calcifications -on atorvastatin 20mg  -His recent total cholesterol was 103 and his LDL cholesterol was 56 11/2023  Morbid obesity/Pre-DM: -HgA1c 6.4 11/2023 -he should adhere to low fat/low cholesterol diet, low carbohydrate diet and weight loss advised -He was recently seen by bariatric surgery and currently undergoing workup prior to bariatric surgery he is trying to lose weight with medications first.  states he is now on zepbound 7.5mg weekly, phentermine 15mg once daily and topiramate 25mg BID. [facility].  states he started at 350 lb and currently down to 288 lb - advised continue follow up with weight loss institute - lifestyle modif including healthy diet, exercise  PORTILLO: -on CPAP-followed by pulmonary  Vitamin D Deficiency: states he completed weekly vitamin d and is now taking a mvt daily - will check labs  Elevated LFTs/fatty liver: -His most recent ALT was 51 11/2023 -hepatitis B and c serologies were negative -Weight loss advised - will check labs today  BPH: -Sees urologist -On Flomax No

## 2024-03-08 LAB
25(OH)D3 SERPL-MCNC: 40.7 NG/ML
ALBUMIN SERPL ELPH-MCNC: 4.7 G/DL
ALP BLD-CCNC: 79 U/L
ALT SERPL-CCNC: 33 U/L
ANION GAP SERPL CALC-SCNC: 13 MMOL/L
AST SERPL-CCNC: 24 U/L
BILIRUB SERPL-MCNC: 0.7 MG/DL
BUN SERPL-MCNC: 13 MG/DL
CALCIUM SERPL-MCNC: 10.2 MG/DL
CHLORIDE SERPL-SCNC: 103 MMOL/L
CO2 SERPL-SCNC: 25 MMOL/L
CREAT SERPL-MCNC: 1.06 MG/DL
EGFR: 84 ML/MIN/1.73M2
ESTIMATED AVERAGE GLUCOSE: 111 MG/DL
GLUCOSE SERPL-MCNC: 95 MG/DL
HBA1C MFR BLD HPLC: 5.5 %
POTASSIUM SERPL-SCNC: 4.7 MMOL/L
PROT SERPL-MCNC: 7.3 G/DL
SODIUM SERPL-SCNC: 141 MMOL/L

## 2024-03-18 ENCOUNTER — APPOINTMENT (OUTPATIENT)
Dept: BARIATRICS | Facility: CLINIC | Age: 54
End: 2024-03-18
Payer: COMMERCIAL

## 2024-03-18 VITALS
SYSTOLIC BLOOD PRESSURE: 106 MMHG | WEIGHT: 283.38 LBS | DIASTOLIC BLOOD PRESSURE: 66 MMHG | RESPIRATION RATE: 16 BRPM | BODY MASS INDEX: 40.12 KG/M2 | HEIGHT: 70.5 IN | OXYGEN SATURATION: 95 % | HEART RATE: 87 BPM | TEMPERATURE: 98.3 F

## 2024-03-18 PROCEDURE — 97803 MED NUTRITION INDIV SUBSEQ: CPT

## 2024-03-28 ENCOUNTER — APPOINTMENT (OUTPATIENT)
Dept: PULMONOLOGY | Facility: CLINIC | Age: 54
End: 2024-03-28
Payer: COMMERCIAL

## 2024-03-28 VITALS
WEIGHT: 279 LBS | BODY MASS INDEX: 39.94 KG/M2 | HEIGHT: 70 IN | HEART RATE: 100 BPM | RESPIRATION RATE: 16 BRPM | DIASTOLIC BLOOD PRESSURE: 86 MMHG | OXYGEN SATURATION: 97 % | SYSTOLIC BLOOD PRESSURE: 126 MMHG

## 2024-03-28 DIAGNOSIS — E66.01 MORBID (SEVERE) OBESITY DUE TO EXCESS CALORIES: ICD-10-CM

## 2024-03-28 DIAGNOSIS — G47.33 OBSTRUCTIVE SLEEP APNEA (ADULT) (PEDIATRIC): ICD-10-CM

## 2024-03-28 DIAGNOSIS — R06.02 SHORTNESS OF BREATH: ICD-10-CM

## 2024-03-28 DIAGNOSIS — J30.9 ALLERGIC RHINITIS, UNSPECIFIED: ICD-10-CM

## 2024-03-28 PROCEDURE — G2211 COMPLEX E/M VISIT ADD ON: CPT

## 2024-03-28 PROCEDURE — 99214 OFFICE O/P EST MOD 30 MIN: CPT

## 2024-03-28 RX ORDER — CELECOXIB 200 MG/1
200 CAPSULE ORAL
Qty: 28 | Refills: 0 | Status: DISCONTINUED | COMMUNITY
Start: 2022-09-09 | End: 2024-03-28

## 2024-03-28 NOTE — CONSULT LETTER
[Dear  ___] : Dear  [unfilled], [Consult Letter:] : I had the pleasure of evaluating your patient, [unfilled]. [Please see my note below.] : Please see my note below. [Consult Closing:] : Thank you very much for allowing me to participate in the care of this patient.  If you have any questions, please do not hesitate to contact me. [Sincerely,] : Sincerely, [FreeTextEntry3] : Bal Kapoor MD, FCCP, D. ABSM\par  Pulmonary and Sleep Medicine\par  [facility] Pulmonary and Sleep Medicine at [place]

## 2024-03-28 NOTE — RESULTS/DATA
[TextEntry] : PSG from 12/13/14 revealed severe PORTILLO an AHI of 30.7  Compliance with APAP was 100% with a residual AHI of 1.4 and an average pressure of 12 cm of water. Xray: Chest: CXR from 11/20/14 was negative by report. Echo from 5/22/23 revealed normal EF with mild diastolic dysfunction with dilated pulm artery but without pulm HTN. CXR from 9/18/23 was clear by report. The patient's overall compliance is 97% with a >4hr compliance of 97% on autoCPAP with a median and max pressure of 12.6 and 15.8 cm of water, respectively with a residual AHI of 6.3 which is c/w mild PORTILLO; adjusted pressures to 10-20 cm of water.

## 2024-03-28 NOTE — REASON FOR VISIT
[Follow-Up] : a follow-up visit [Shortness of Breath] : shortness of breath [Sleep Apnea] : sleep apnea [Obesity] : obesity [TextBox_44] : Prior Covid infection

## 2024-03-28 NOTE — DISCUSSION/SUMMARY
[FreeTextEntry1] : #1. Diet and exercise for weight loss #2. Continue autoCPAP for treatment of his severe PORTILLO; changed pressure to 10-20 cm of water given residual mild PORTILLO of 6.3 on lower pressures. Will order a new machine as his current machine is no longer transmitting data and is > 8 years old per our records. #3. Nasonex for possible PNDS #4. Consider ENT evaluation. #5. CXR from 11/20/14 is negative by report; repeat was again clear. #6. Last chest CT from 10/25/22 with stable RLL nodule going back to 2019 therefore no further imaging studies are now required. #7. Consider cardiology evaluation to further w/u possible pulm HTN with echo given prominent PA noted on prior chest CT or for his SOB if needed though patient reports improvement with weight loss.  #7. F/u in 3 months with compliance and philippe. #8. Replace equipment as needed; ordered 3/28/24 with decrease in pressure to 6-16 cm of water. #9. Pt had both Covid vaccines; s/p Covid infection.  The patient expressed understanding and agreement with the above recommendations/plan and accepts responsibility to be compliant with recommended testing, therapies, and f/u visits. All relevant questions and concerns were addressed.

## 2024-03-28 NOTE — PHYSICAL EXAM
[No Acute Distress] : no acute distress [Low Lying Soft Palate] : low lying soft palate [Elongated Uvula] : elongated uvula [Enlarged Base of the Tongue] : enlarged base of the tongue [III] : Mallampati Class: III [3+] : Right Tonsil: 3+ [Normal Appearance] : normal appearance [Supple] : supple [Normal Rate/Rhythm] : normal rate/rhythm [Normal S1, S2] : normal s1, s2 [No Murmurs] : no murmurs [No Resp Distress] : no resp distress [No Acc Muscle Use] : no acc muscle use [Normal Rhythm and Effort] : normal rhythm and effort [Clear to Auscultation Bilaterally] : clear to auscultation bilaterally [No Abnormalities] : no abnormalities [Benign] : benign [Not Tender] : not tender [Soft] : soft [No Edema] : no edema [No Clubbing] : no clubbing [No Focal Deficits] : no focal deficits [Oriented x3] : oriented x3 [TextBox_11] : Moderate to severe oropharyngeal crowding.

## 2024-03-28 NOTE — HISTORY OF PRESENT ILLNESS
[CPAP] : CPAP [Good Symptom Control] : good symptom control [Follow-Up - Routine Clinic] : a routine clinic follow-up of [Excess Weight] : excess weight [Dyspnea] : dyspnea [Joint Pain] : joint pain [Low Calorie Diet] : low calorie diet [Exercise Regimen] : exercise regimen [Good Compliance] : good compliance with treatment [Good Tolerance] : good tolerance of treatment [Fair Symptom Control] : fair symptom control [Sleep Apnea] : sleep apnea [High] : high [Low Calorie] : low calorie [Well Balanced Diet] : well balanced meals [Low Carb Diet] : low carbohydrate food choices [On ___] : performed on [unfilled] [Patient] : the patient [To Assess ___] : to assess [unfilled] [None] : no new symptoms reported [TextBox_4] : Former smoker 1 ppw x 3-4 years, quit 2005. S/p smoking medical marijuana and then used edibles. S/p Covid infection 2021 with mild symptoms and did not require therapy. The patient reports intermittent shortness of breath, particularly with exertion. 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 He is otherwise without other chronic respiratory complaints.  He is trying to lose weight though with Monjauro but is still considering bariatric surgery.   [de-identified] : Auto titrating machine [FreeTextEntry9] : Chest CT [FreeTextEntry8] : Stable RLL nodule since 2019 with prominent PA ? c/w pulm HTN.

## 2024-03-28 NOTE — END OF VISIT
Results in MD note [Time Spent: ___ minutes] : I have spent [unfilled] minutes of time on the encounter. [TextEntry] : Discussed with pt at length regarding PORTILLO, obesity, smoking hx, soboe, prior Covid infection, abnormal CT, nodule; reviewed w/u with pt as above.

## 2024-04-08 ENCOUNTER — TRANSCRIPTION ENCOUNTER (OUTPATIENT)
Age: 54
End: 2024-04-08

## 2024-04-09 ENCOUNTER — APPOINTMENT (OUTPATIENT)
Dept: GASTROENTEROLOGY | Facility: HOSPITAL | Age: 54
End: 2024-04-09
Payer: COMMERCIAL

## 2024-04-09 ENCOUNTER — OUTPATIENT (OUTPATIENT)
Dept: OUTPATIENT SERVICES | Facility: HOSPITAL | Age: 54
LOS: 1 days | End: 2024-04-09
Payer: COMMERCIAL

## 2024-04-09 ENCOUNTER — RESULT REVIEW (OUTPATIENT)
Age: 54
End: 2024-04-09

## 2024-04-09 DIAGNOSIS — Z98.890 OTHER SPECIFIED POSTPROCEDURAL STATES: Chronic | ICD-10-CM

## 2024-04-09 DIAGNOSIS — Z98.84 BARIATRIC SURGERY STATUS: ICD-10-CM

## 2024-04-09 DIAGNOSIS — K21.9 GASTRO-ESOPHAGEAL REFLUX DISEASE WITHOUT ESOPHAGITIS: ICD-10-CM

## 2024-04-09 DIAGNOSIS — Z01.818 ENCOUNTER FOR OTHER PREPROCEDURAL EXAMINATION: ICD-10-CM

## 2024-04-09 PROCEDURE — 43239 EGD BIOPSY SINGLE/MULTIPLE: CPT

## 2024-04-09 PROCEDURE — 88305 TISSUE EXAM BY PATHOLOGIST: CPT

## 2024-04-09 PROCEDURE — 88342 IMHCHEM/IMCYTCHM 1ST ANTB: CPT

## 2024-04-09 PROCEDURE — 88342 IMHCHEM/IMCYTCHM 1ST ANTB: CPT | Mod: 26

## 2024-04-09 PROCEDURE — 88305 TISSUE EXAM BY PATHOLOGIST: CPT | Mod: 26

## 2024-04-09 NOTE — ASSESSMENT
[FreeTextEntry1] : Pt's BMI is now below 40. He is therefore medically optimized for the proposed EGD.

## 2024-04-09 NOTE — PHYSICAL EXAM
[Alert] : alert [Normal Voice/Communication] : normal voice/communication [Healthy Appearing] : healthy appearing [No Acute Distress] : no acute distress [Well Developed] : well developed [Well Nourished] : well nourished [Sclera] : the sclera and conjunctiva were normal [Hearing Threshold Finger Rub Not Woods] : hearing was normal [Normal Lips/Gums] : the lips and gums were normal [Oropharynx] : the oropharynx was normal [Normal Appearance] : the appearance of the neck was normal [No Neck Mass] : no neck mass was observed [No Respiratory Distress] : no respiratory distress [No Acc Muscle Use] : no accessory muscle use [Respiration, Rhythm And Depth] : normal respiratory rhythm and effort [Auscultation Breath Sounds / Voice Sounds] : lungs were clear to auscultation bilaterally [Heart Rate And Rhythm] : heart rate was normal and rhythm regular [Normal S1, S2] : normal S1 and S2 [Murmurs] : no murmurs [None] : no edema [Bowel Sounds] : normal bowel sounds [Abdomen Tenderness] : non-tender [No Masses] : no abdominal mass palpated [Abdomen Soft] : soft [] : no hepatosplenomegaly [No CVA Tenderness] : no CVA  tenderness [Abnormal Walk] : normal gait [No Joint Swelling] : no joint swelling seen [Normal Color / Pigmentation] : normal skin color and pigmentation [Oriented To Time, Place, And Person] : oriented to person, place, and time [de-identified] : Patient is morbidly obese [de-identified] : Deferred at this time

## 2024-04-12 ENCOUNTER — APPOINTMENT (OUTPATIENT)
Dept: RHEUMATOLOGY | Facility: CLINIC | Age: 54
End: 2024-04-12
Payer: COMMERCIAL

## 2024-04-12 VITALS
DIASTOLIC BLOOD PRESSURE: 82 MMHG | OXYGEN SATURATION: 96 % | SYSTOLIC BLOOD PRESSURE: 126 MMHG | TEMPERATURE: 98.5 F | HEIGHT: 70 IN | HEART RATE: 91 BPM

## 2024-04-12 LAB
ALBUMIN SERPL ELPH-MCNC: 4.6 G/DL
ALP BLD-CCNC: 80 U/L
ALT SERPL-CCNC: 34 U/L
ANION GAP SERPL CALC-SCNC: 9 MMOL/L
AST SERPL-CCNC: 25 U/L
BILIRUB SERPL-MCNC: 0.8 MG/DL
BUN SERPL-MCNC: 12 MG/DL
CALCIUM SERPL-MCNC: 9.8 MG/DL
CHLORIDE SERPL-SCNC: 103 MMOL/L
CO2 SERPL-SCNC: 29 MMOL/L
CREAT SERPL-MCNC: 1.11 MG/DL
CRP SERPL-MCNC: <3 MG/L
EGFR: 80 ML/MIN/1.73M2
ERYTHROCYTE [SEDIMENTATION RATE] IN BLOOD BY WESTERGREN METHOD: 7 MM/HR
GLUCOSE SERPL-MCNC: 96 MG/DL
HCT VFR BLD CALC: 44.2 %
HGB BLD-MCNC: 15.2 G/DL
MCHC RBC-ENTMCNC: 30.3 PG
MCHC RBC-ENTMCNC: 34.4 GM/DL
MCV RBC AUTO: 88.2 FL
PLATELET # BLD AUTO: 288 K/UL
POTASSIUM SERPL-SCNC: 4.4 MMOL/L
PROT SERPL-MCNC: 7.5 G/DL
RBC # BLD: 5.01 M/UL
RBC # FLD: 13.6 %
SODIUM SERPL-SCNC: 140 MMOL/L
SURGICAL PATHOLOGY STUDY: SIGNIFICANT CHANGE UP
WBC # FLD AUTO: 5.02 K/UL

## 2024-04-12 PROCEDURE — 99214 OFFICE O/P EST MOD 30 MIN: CPT

## 2024-04-12 RX ORDER — GABAPENTIN 300 MG/1
300 CAPSULE ORAL
Qty: 90 | Refills: 1 | Status: ACTIVE | COMMUNITY
Start: 2023-10-19 | End: 1900-01-01

## 2024-04-12 RX ORDER — MELOXICAM 15 MG/1
15 TABLET ORAL
Qty: 30 | Refills: 3 | Status: DISCONTINUED | COMMUNITY
Start: 2023-10-19 | End: 2024-04-12

## 2024-04-12 RX ORDER — ETANERCEPT 50 MG/ML
50 SOLUTION SUBCUTANEOUS
Qty: 4 | Refills: 3 | Status: ACTIVE | COMMUNITY
Start: 2023-03-09 | End: 1900-01-01

## 2024-04-12 NOTE — ASSESSMENT
[FreeTextEntry1] : 52 yo man with morbid obesity, depression/anxiety, marked right knee OA s/p right TKR , CTS s/p decompression b/l psoriasis, PSA and fibromyalgia. lost response to humira and MTX combination. now on Enbrel.  He was doing well on enbrel until about 1 month ago.  for the past month he has b/l shoulder pain exacerbated by movement ( abduction) and cervical pain. Fibromyalgia and CTS improevd with gabapentin.  however cymbalta was d/c by psych and on welbutrin for depression    --cont enbrel --cont gabapentin 300 qhss  --add tizanidine for cervical pain  --xray shoudler and cervical spine --start PT for cervical and shoulder pain --use CT splints at bedtime  --drug monitoring labs prior to next visit --fibromyalgia better on gabapentin  --uptodate with covid vaccine, flu and prevnar --f/u with bariatic surgery.  cont sepbound --encourgae CPAP use

## 2024-04-12 NOTE — HISTORY OF PRESENT ILLNESS
[FreeTextEntry1] : 52 yo man with history of depression,/anxiety HTN, obesity, FATTY LIVER. enlarged prostate, knee OA s/p Right TKR , CTS ( history of bilateral medium nerve decompression) psoriasis and PSA.  FOBT positive , had normal c-scope on MTX 8 tabs developed abnormal LFts   today: patient is on enbrel.  He is on gabapentin 300 qhs for fibromyalgia.  Previoulsy was on cymbalta.  started seeing pyschiatry and was switched to welbutrin from cymbalta about 1 month ago.  Patient today complaints of bilateral shoulder pain on abduction and cervical pain.  he denies any radicular pain.   this pain has been presnet for now 1 month.   -he is seeing bariatric surgery and getting evaluation.  he is also on septbound and has lost 60lbs.   -patient saw pulmonary and pulmonary nodule has been stable since 2019.  no need to continue following this nodule.  sh eis noted to have PORTILLO and is encouraged to use CPAP.  -patient seen by cardiology who does not believe he has pHTN.     patient is followed by cardiology.  on ECHO he was noted to have pulmonary artery dilation however per cardio seems like this is normal for his habitus.  ( Dr Suleiman Mendieta)   Prior med history:   humira lost efficacy  MTX 8 tabs caused abnormal LFTs  recent labs with normal ESR/CRP and cbc/cmp  labs in the past :  cbc aND CMP NORMAL  esr 19 AND CRP 5  LYME NEGATIVE ZAHRA 1.80 NUCLEOLAR NEGATIVE CCP/RF/DSDNA/CAMPBELL/SJOGRENS/SCL 70/DEB/CENTROMERE URIC 5.7 CPK NORMAL PSA NORMAL TSH NORMAL  hepb/c/quantiferon  negative ( 6/2021)  xrays:  shoulders normal hips normal hand normal Left foot calcaneal spur r foot normal R knee marked OA  L knee mild OA  ankles with mod calcification of the distal Achilles b/l  and left moderate proximal plantar fascia calcifications   patient denies any IBD s/s, history of STDs, sausage digits, nail changes, red painful eye  lupus ROS negative

## 2024-04-12 NOTE — PHYSICAL EXAM
[General Appearance - Well Nourished] : well nourished [General Appearance - Well Developed] : well developed [Sclera] : the sclera and conjunctiva were normal [Hearing Threshold Finger Rub Not [place]] : hearing was normal [] : no rash [Skin Lesions] : no skin lesions [Affect] : the affect was normal [Mood] : the mood was normal [Nail Clubbing] : no clubbing  or cyanosis of the fingernails [Motor Tone] : muscle strength and tone were normal [FreeTextEntry1] : no evidence of synovitis, pain over the shoulders on abduction,  cervical pain with reduce ROM.  no Tinel or phalens .  shoulder FROM

## 2024-04-13 ENCOUNTER — NON-APPOINTMENT (OUTPATIENT)
Age: 54
End: 2024-04-13

## 2024-04-22 ENCOUNTER — APPOINTMENT (OUTPATIENT)
Dept: BARIATRICS | Facility: CLINIC | Age: 54
End: 2024-04-22

## 2024-04-30 ENCOUNTER — APPOINTMENT (OUTPATIENT)
Dept: INTERNAL MEDICINE | Facility: CLINIC | Age: 54
End: 2024-04-30

## 2024-05-24 ENCOUNTER — APPOINTMENT (OUTPATIENT)
Dept: BARIATRICS | Facility: CLINIC | Age: 54
End: 2024-05-24
Payer: COMMERCIAL

## 2024-05-24 VITALS
HEART RATE: 92 BPM | TEMPERATURE: 97.7 F | HEIGHT: 70.5 IN | SYSTOLIC BLOOD PRESSURE: 112 MMHG | DIASTOLIC BLOOD PRESSURE: 72 MMHG | BODY MASS INDEX: 36.01 KG/M2 | WEIGHT: 254.4 LBS | RESPIRATION RATE: 16 BRPM | OXYGEN SATURATION: 97 %

## 2024-05-24 PROCEDURE — 97803 MED NUTRITION INDIV SUBSEQ: CPT

## 2024-06-07 ENCOUNTER — APPOINTMENT (OUTPATIENT)
Dept: INTERNAL MEDICINE | Facility: CLINIC | Age: 54
End: 2024-06-07
Payer: COMMERCIAL

## 2024-06-07 VITALS
HEIGHT: 70.5 IN | DIASTOLIC BLOOD PRESSURE: 68 MMHG | HEART RATE: 87 BPM | WEIGHT: 253 LBS | RESPIRATION RATE: 12 BRPM | BODY MASS INDEX: 35.82 KG/M2 | SYSTOLIC BLOOD PRESSURE: 114 MMHG

## 2024-06-07 VITALS — HEIGHT: 70.5 IN | BODY MASS INDEX: 35.82 KG/M2 | WEIGHT: 253 LBS

## 2024-06-07 DIAGNOSIS — Z00.00 ENCOUNTER FOR GENERAL ADULT MEDICAL EXAMINATION W/OUT ABNORMAL FINDINGS: ICD-10-CM

## 2024-06-07 DIAGNOSIS — R73.03 PREDIABETES.: ICD-10-CM

## 2024-06-07 DIAGNOSIS — L40.50 ARTHROPATHIC PSORIASIS, UNSPECIFIED: ICD-10-CM

## 2024-06-07 DIAGNOSIS — M17.31 UNILATERAL POST-TRAUMATIC OSTEOARTHRITIS, RIGHT KNEE: ICD-10-CM

## 2024-06-07 DIAGNOSIS — I10 ESSENTIAL (PRIMARY) HYPERTENSION: ICD-10-CM

## 2024-06-07 PROCEDURE — 99396 PREV VISIT EST AGE 40-64: CPT

## 2024-06-07 PROCEDURE — G0444 DEPRESSION SCREEN ANNUAL: CPT | Mod: 59

## 2024-06-07 PROCEDURE — 36415 COLL VENOUS BLD VENIPUNCTURE: CPT

## 2024-06-07 RX ORDER — TIZANIDINE 2 MG/1
2 TABLET ORAL EVERY 8 HOURS
Qty: 60 | Refills: 3 | Status: ACTIVE | COMMUNITY
Start: 2024-04-12 | End: 1900-01-01

## 2024-06-07 NOTE — ASSESSMENT
[FreeTextEntry1] : for cpe check labs lipid psa and thyroid psoriatic arthitis ptsd stable sees psych

## 2024-06-07 NOTE — HEALTH RISK ASSESSMENT
[Excellent] : ~his/her~  mood as  excellent [No] : No [No falls in past year] : Patient reported no falls in the past year [Little interest or pleasure doing things] : 1) Little interest or pleasure doing things [Feeling down, depressed, or hopeless] : 2) Feeling down, depressed, or hopeless [0] : 2) Feeling down, depressed, or hopeless: Not at all (0) [PHQ-2 Negative - No further assessment needed] : PHQ-2 Negative - No further assessment needed [[id number]] : 0 [Never] : Never [NO] : No [HIV test declined] : HIV test declined [Hepatitis C test declined] : Hepatitis C test declined [Change in mental status noted] : No change in mental status noted [Language] : denies difficulty with language [Behavior] : denies difficulty with behavior [Learning/Retaining New Information] : denies difficulty learning/retaining new information [Handling Complex Tasks] : denies difficulty handling complex tasks [Reasoning] : denies difficulty with reasoning [Spatial Ability and Orientation] : denies difficulty with spatial ability and orientation [None] : None [With Significant Other] : lives with significant other [High School] : high school [] :  [Sexually Active] : sexually active [High Risk Behavior] : no high risk behavior [Feels Safe at Home] : Feels safe at home [Fully functional (bathing, dressing, toileting, transferring, walking, feeding)] : Fully functional (bathing, dressing, toileting, transferring, walking, feeding) [Fully functional (using the telephone, shopping, preparing meals, housekeeping, doing laundry, using] : Fully functional and needs no help or supervision to perform IADLs (using the telephone, shopping, preparing meals, housekeeping, doing laundry, using transportation, managing medications and managing finances) [Reports changes in hearing] : Reports no changes in hearing [Reports changes in vision] : Reports no changes in vision [Reports normal functional visual acuity (ie: able to read med bottle)] : Reports poor functional visual acuity.  [Reports changes in dental health] : Reports no changes in dental health [Smoke Detector] : no smoke detector [Carbon Monoxide Detector] : carbon monoxide detector [Safety elements used in home] : safety elements used in home [Seat Belt] :  uses seat belt [Sunscreen] : does not use sunscreen [Travel to Developing Areas] : does not  travel to developing areas [TB Exposure] : is not being exposed to tuberculosis [Caregiver Concerns] : does not have caregiver concerns [ColonoscopyDate] : 2023

## 2024-06-08 LAB
CHOLEST SERPL-MCNC: 82 MG/DL
HDLC SERPL-MCNC: 36 MG/DL
LDLC SERPL CALC-MCNC: 33 MG/DL
NONHDLC SERPL-MCNC: 46 MG/DL
PSA SERPL-MCNC: 0.62 NG/ML
T4 FREE SERPL-MCNC: 1.4 NG/DL
TRIGL SERPL-MCNC: 57 MG/DL
TSH SERPL-ACNC: 2.02 UIU/ML

## 2024-06-11 ENCOUNTER — APPOINTMENT (OUTPATIENT)
Dept: INTERNAL MEDICINE | Facility: CLINIC | Age: 54
End: 2024-06-11

## 2024-06-17 ENCOUNTER — NON-APPOINTMENT (OUTPATIENT)
Age: 54
End: 2024-06-17

## 2024-07-08 ENCOUNTER — APPOINTMENT (OUTPATIENT)
Dept: PULMONOLOGY | Facility: CLINIC | Age: 54
End: 2024-07-08

## 2024-07-15 ENCOUNTER — APPOINTMENT (OUTPATIENT)
Dept: RHEUMATOLOGY | Facility: CLINIC | Age: 54
End: 2024-07-15

## 2024-07-15 VITALS
SYSTOLIC BLOOD PRESSURE: 130 MMHG | TEMPERATURE: 98.1 F | OXYGEN SATURATION: 97 % | HEIGHT: 70.5 IN | BODY MASS INDEX: 32.56 KG/M2 | HEART RATE: 100 BPM | DIASTOLIC BLOOD PRESSURE: 80 MMHG | RESPIRATION RATE: 17 BRPM | WEIGHT: 230 LBS

## 2024-07-15 DIAGNOSIS — M50.90 CERVICAL DISC DISORDER, UNSPECIFIED, UNSPECIFIED CERVICAL REGION: ICD-10-CM

## 2024-07-15 DIAGNOSIS — M19.019 PRIMARY OSTEOARTHRITIS, UNSPECIFIED SHOULDER: ICD-10-CM

## 2024-07-15 DIAGNOSIS — M19.012 PRIMARY OSTEOARTHRITIS, RIGHT SHOULDER: ICD-10-CM

## 2024-07-15 DIAGNOSIS — L40.50 ARTHROPATHIC PSORIASIS, UNSPECIFIED: ICD-10-CM

## 2024-07-15 DIAGNOSIS — M19.011 PRIMARY OSTEOARTHRITIS, RIGHT SHOULDER: ICD-10-CM

## 2024-07-15 DIAGNOSIS — Z00.00 ENCOUNTER FOR GENERAL ADULT MEDICAL EXAMINATION W/OUT ABNORMAL FINDINGS: ICD-10-CM

## 2024-07-15 PROCEDURE — XXXXX: CPT | Mod: 1L

## 2024-08-16 ENCOUNTER — APPOINTMENT (OUTPATIENT)
Dept: CARDIOLOGY | Facility: CLINIC | Age: 54
End: 2024-08-16

## 2024-10-16 ENCOUNTER — NON-APPOINTMENT (OUTPATIENT)
Age: 54
End: 2024-10-16

## 2024-11-04 ENCOUNTER — RX RENEWAL (OUTPATIENT)
Age: 54
End: 2024-11-04

## 2024-12-11 ENCOUNTER — RX RENEWAL (OUTPATIENT)
Age: 54
End: 2024-12-11

## 2024-12-19 ENCOUNTER — APPOINTMENT (OUTPATIENT)
Dept: UROLOGY | Facility: CLINIC | Age: 54
End: 2024-12-19
Payer: COMMERCIAL

## 2024-12-19 VITALS — DIASTOLIC BLOOD PRESSURE: 71 MMHG | SYSTOLIC BLOOD PRESSURE: 111 MMHG | HEART RATE: 98 BPM

## 2024-12-19 DIAGNOSIS — R31.0 GROSS HEMATURIA: ICD-10-CM

## 2024-12-19 DIAGNOSIS — N40.1 BENIGN PROSTATIC HYPERPLASIA WITH LOWER URINARY TRACT SYMPMS: ICD-10-CM

## 2024-12-19 DIAGNOSIS — N13.8 BENIGN PROSTATIC HYPERPLASIA WITH LOWER URINARY TRACT SYMPMS: ICD-10-CM

## 2024-12-19 PROCEDURE — 99214 OFFICE O/P EST MOD 30 MIN: CPT

## 2024-12-20 LAB
APPEARANCE: CLEAR
BACTERIA: NEGATIVE /HPF
BILIRUBIN URINE: NEGATIVE
BLOOD URINE: ABNORMAL
CAST: 0 /LPF
COLOR: ABNORMAL
EPITHELIAL CELLS: 0 /HPF
GLUCOSE QUALITATIVE U: NEGATIVE MG/DL
KETONES URINE: NEGATIVE MG/DL
LEUKOCYTE ESTERASE URINE: ABNORMAL
MICROSCOPIC-UA: NORMAL
NITRITE URINE: NEGATIVE
PH URINE: 7
PROTEIN URINE: 30 MG/DL
RED BLOOD CELLS URINE: 15 /HPF
SPECIFIC GRAVITY URINE: 1.01
UROBILINOGEN URINE: 0.2 MG/DL
WHITE BLOOD CELLS URINE: 1 /HPF

## 2024-12-20 NOTE — H&P PST ADULT - SYMPTOMS
Provider: ELIEL FIGUEREDO    Caller: Lanie Moctezuma    Relationship to Patient: Self    Phone Number: [phone]     Reason for Call: PATIENT IS CALLING TO REQUEST A NOTE TO RETURN TO WORK.  SHE STATES HER COVID SYMPTOMS BEGAN ON TUESDAY AND SHE WAS NOT ABLE TO GO TO WORK ON WEDNESDAY, THURSDAY OR TODAY.  SHE WANTS TO RETURN TO WORK, (AT HOME) TOMORROW, BUT HER EMPLOYER REQUIRES A NOTE RELEASING HER.  SHE IS WANTING TO KNOW IF MS FIGUEREDO WOULD BE WILLING TO PLACE A RELEASE LETTER IN HER MY CHART.  SHE STATES IT NEEDS TO BE DONE BEFORE THE END OF TODAY.      PLEASE ADVISE       none

## 2024-12-23 LAB — URINE CYTOLOGY: NORMAL

## 2024-12-25 LAB — BACTERIA UR CULT: NORMAL

## 2025-01-10 ENCOUNTER — NON-APPOINTMENT (OUTPATIENT)
Age: 55
End: 2025-01-10

## 2025-01-10 ENCOUNTER — APPOINTMENT (OUTPATIENT)
Dept: RHEUMATOLOGY | Facility: CLINIC | Age: 55
End: 2025-01-10
Payer: COMMERCIAL

## 2025-01-10 VITALS
SYSTOLIC BLOOD PRESSURE: 106 MMHG | HEIGHT: 70.5 IN | WEIGHT: 230 LBS | BODY MASS INDEX: 32.56 KG/M2 | HEART RATE: 87 BPM | DIASTOLIC BLOOD PRESSURE: 60 MMHG | OXYGEN SATURATION: 97 % | TEMPERATURE: 98.3 F

## 2025-01-10 DIAGNOSIS — L40.50 ARTHROPATHIC PSORIASIS, UNSPECIFIED: ICD-10-CM

## 2025-01-10 DIAGNOSIS — M25.511 PAIN IN RIGHT SHOULDER: ICD-10-CM

## 2025-01-10 PROCEDURE — 99214 OFFICE O/P EST MOD 30 MIN: CPT

## 2025-01-10 RX ORDER — METHYLPRED ACET/NACL,ISO-OS/PF 40 MG/ML
40 VIAL (ML) INJECTION
Refills: 0 | Status: COMPLETED | OUTPATIENT
Start: 2025-01-10

## 2025-01-10 RX ADMIN — METHYLPREDNISOLONE ACETATE 0 MG/ML: 40 INJECTION, SUSPENSION INTRA-ARTICULAR; INTRALESIONAL; INTRAMUSCULAR; SOFT TISSUE at 00:00

## 2025-01-13 ENCOUNTER — NON-APPOINTMENT (OUTPATIENT)
Age: 55
End: 2025-01-13

## 2025-01-14 ENCOUNTER — APPOINTMENT (OUTPATIENT)
Dept: UROLOGY | Facility: CLINIC | Age: 55
End: 2025-01-14
Payer: COMMERCIAL

## 2025-01-14 VITALS
BODY MASS INDEX: 32.54 KG/M2 | HEART RATE: 90 BPM | WEIGHT: 230 LBS | SYSTOLIC BLOOD PRESSURE: 103 MMHG | DIASTOLIC BLOOD PRESSURE: 68 MMHG

## 2025-01-14 DIAGNOSIS — N13.8 BENIGN PROSTATIC HYPERPLASIA WITH LOWER URINARY TRACT SYMPMS: ICD-10-CM

## 2025-01-14 DIAGNOSIS — N20.0 CALCULUS OF KIDNEY: ICD-10-CM

## 2025-01-14 DIAGNOSIS — N40.1 BENIGN PROSTATIC HYPERPLASIA WITH LOWER URINARY TRACT SYMPMS: ICD-10-CM

## 2025-01-14 DIAGNOSIS — N32.81 OVERACTIVE BLADDER: ICD-10-CM

## 2025-01-14 PROCEDURE — 99214 OFFICE O/P EST MOD 30 MIN: CPT

## 2025-01-15 ENCOUNTER — NON-APPOINTMENT (OUTPATIENT)
Age: 55
End: 2025-01-15

## 2025-01-15 ENCOUNTER — APPOINTMENT (OUTPATIENT)
Dept: CARDIOLOGY | Facility: CLINIC | Age: 55
End: 2025-01-15
Payer: COMMERCIAL

## 2025-01-15 VITALS
OXYGEN SATURATION: 95 % | DIASTOLIC BLOOD PRESSURE: 67 MMHG | HEART RATE: 82 BPM | HEIGHT: 70.5 IN | WEIGHT: 191 LBS | BODY MASS INDEX: 27.04 KG/M2 | SYSTOLIC BLOOD PRESSURE: 111 MMHG

## 2025-01-15 DIAGNOSIS — Z98.84 BARIATRIC SURGERY STATUS: ICD-10-CM

## 2025-01-15 DIAGNOSIS — I10 ESSENTIAL (PRIMARY) HYPERTENSION: ICD-10-CM

## 2025-01-15 DIAGNOSIS — R42 DIZZINESS AND GIDDINESS: ICD-10-CM

## 2025-01-15 DIAGNOSIS — F32.A DEPRESSION, UNSPECIFIED: ICD-10-CM

## 2025-01-15 DIAGNOSIS — I25.10 ATHEROSCLEROTIC HEART DISEASE OF NATIVE CORONARY ARTERY W/OUT ANGINA PECTORIS: ICD-10-CM

## 2025-01-15 PROCEDURE — 93000 ELECTROCARDIOGRAM COMPLETE: CPT

## 2025-01-15 PROCEDURE — G2211 COMPLEX E/M VISIT ADD ON: CPT

## 2025-01-15 PROCEDURE — 99214 OFFICE O/P EST MOD 30 MIN: CPT

## 2025-01-20 PROBLEM — N20.0 RENAL CALCULUS, LEFT: Noted: 2019-01-30

## 2025-01-20 PROBLEM — N40.1 BENIGN PROSTATIC HYPERPLASIA WITH URINARY OBSTRUCTION AND OTHER LOWER URINARY TRACT SYMPTOMS: Noted: 2024-12-19

## 2025-01-21 ENCOUNTER — RX RENEWAL (OUTPATIENT)
Age: 55
End: 2025-01-21

## 2025-01-22 ENCOUNTER — APPOINTMENT (OUTPATIENT)
Dept: UROLOGY | Facility: CLINIC | Age: 55
End: 2025-01-22
Payer: COMMERCIAL

## 2025-01-22 ENCOUNTER — NON-APPOINTMENT (OUTPATIENT)
Age: 55
End: 2025-01-22

## 2025-01-22 VITALS
BODY MASS INDEX: 25.76 KG/M2 | DIASTOLIC BLOOD PRESSURE: 71 MMHG | HEART RATE: 80 BPM | HEIGHT: 70.5 IN | OXYGEN SATURATION: 98 % | WEIGHT: 182 LBS | SYSTOLIC BLOOD PRESSURE: 117 MMHG

## 2025-01-22 DIAGNOSIS — N40.0 BENIGN PROSTATIC HYPERPLASIA WITHOUT LOWER URINARY TRACT SYMPMS: ICD-10-CM

## 2025-01-22 DIAGNOSIS — R31.0 GROSS HEMATURIA: ICD-10-CM

## 2025-01-22 DIAGNOSIS — N32.9 BLADDER DISORDER, UNSPECIFIED: ICD-10-CM

## 2025-01-22 DIAGNOSIS — N20.0 CALCULUS OF KIDNEY: ICD-10-CM

## 2025-01-22 PROCEDURE — 99214 OFFICE O/P EST MOD 30 MIN: CPT | Mod: 25

## 2025-01-22 PROCEDURE — A4550 SURGICAL TRAYS: CPT | Mod: NC

## 2025-01-22 PROCEDURE — 52000 CYSTOURETHROSCOPY: CPT

## 2025-01-23 LAB
APPEARANCE: CLEAR
BACTERIA: NEGATIVE /HPF
BILIRUBIN URINE: NEGATIVE
BLOOD URINE: ABNORMAL
CAST: 0 /LPF
COLOR: YELLOW
EPITHELIAL CELLS: 0 /HPF
GLUCOSE QUALITATIVE U: NEGATIVE MG/DL
KETONES URINE: NEGATIVE MG/DL
LEUKOCYTE ESTERASE URINE: NEGATIVE
MICROSCOPIC-UA: NORMAL
NITRITE URINE: NEGATIVE
PH URINE: 7
PROTEIN URINE: NEGATIVE MG/DL
RED BLOOD CELLS URINE: 0 /HPF
REVIEW: NORMAL
SPECIFIC GRAVITY URINE: <1.005
UROBILINOGEN URINE: 0.2 MG/DL
WHITE BLOOD CELLS URINE: 1 /HPF

## 2025-01-24 LAB — BACTERIA UR CULT: NORMAL

## 2025-01-25 ENCOUNTER — APPOINTMENT (OUTPATIENT)
Dept: MRI IMAGING | Facility: CLINIC | Age: 55
End: 2025-01-25

## 2025-01-27 ENCOUNTER — OUTPATIENT (OUTPATIENT)
Dept: OUTPATIENT SERVICES | Facility: HOSPITAL | Age: 55
LOS: 1 days | End: 2025-01-27
Payer: COMMERCIAL

## 2025-01-27 VITALS
HEIGHT: 72 IN | OXYGEN SATURATION: 98 % | RESPIRATION RATE: 16 BRPM | TEMPERATURE: 98 F | SYSTOLIC BLOOD PRESSURE: 113 MMHG | DIASTOLIC BLOOD PRESSURE: 66 MMHG | HEART RATE: 74 BPM | WEIGHT: 188.94 LBS

## 2025-01-27 DIAGNOSIS — N20.0 CALCULUS OF KIDNEY: ICD-10-CM

## 2025-01-27 DIAGNOSIS — Z98.890 OTHER SPECIFIED POSTPROCEDURAL STATES: Chronic | ICD-10-CM

## 2025-01-27 DIAGNOSIS — Z01.818 ENCOUNTER FOR OTHER PREPROCEDURAL EXAMINATION: ICD-10-CM

## 2025-01-27 DIAGNOSIS — I10 ESSENTIAL (PRIMARY) HYPERTENSION: ICD-10-CM

## 2025-01-27 DIAGNOSIS — Z96.651 PRESENCE OF RIGHT ARTIFICIAL KNEE JOINT: Chronic | ICD-10-CM

## 2025-01-27 LAB
APPEARANCE UR: ABNORMAL
BILIRUB UR-MCNC: NEGATIVE — SIGNIFICANT CHANGE UP
COLOR SPEC: YELLOW — SIGNIFICANT CHANGE UP
DIFF PNL FLD: ABNORMAL
GLUCOSE UR QL: NEGATIVE MG/DL — SIGNIFICANT CHANGE UP
KETONES UR-MCNC: NEGATIVE MG/DL — SIGNIFICANT CHANGE UP
LEUKOCYTE ESTERASE UR-ACNC: ABNORMAL
NITRITE UR-MCNC: NEGATIVE — SIGNIFICANT CHANGE UP
PH UR: 6.5 — SIGNIFICANT CHANGE UP (ref 5–8)
PROT UR-MCNC: SIGNIFICANT CHANGE UP MG/DL
SP GR SPEC: 1.02 — SIGNIFICANT CHANGE UP (ref 1–1.03)
UROBILINOGEN FLD QL: 0.2 MG/DL — SIGNIFICANT CHANGE UP (ref 0.2–1)

## 2025-01-27 PROCEDURE — 99214 OFFICE O/P EST MOD 30 MIN: CPT | Mod: 25

## 2025-01-27 PROCEDURE — 87086 URINE CULTURE/COLONY COUNT: CPT

## 2025-01-27 PROCEDURE — 81001 URINALYSIS AUTO W/SCOPE: CPT

## 2025-01-27 NOTE — H&P PST ADULT - HISTORY OF PRESENT ILLNESS
53 y/o male presents to PST for scheduled left ureteroscopy laser lithotripsy vacuum stone on 2/3/25. Patient reports hematuria 11/2024, seen by urologist and referred for diagnostic. Patient reports diagnostic testing, cystoscopy done and kidney stone noted.

## 2025-01-27 NOTE — H&P PST ADULT - NSICDXPASTMEDICALHX_GEN_ALL_CORE_FT
PAST MEDICAL HISTORY:  Anxiety and depression     Arthralgia of right knee     BPH (benign prostatic hyperplasia)     Deviated septum     Fatty liver     Flank pain Left    Hemorrhoids, unspecified hemorrhoid type     Hypertension, unspecified type     Kidney stone on left side     Nasal congestion     Obstructive sleep apnea syndrome CPAP Machine    Psoriasis

## 2025-01-27 NOTE — H&P PST ADULT - ASSESSMENT
[IUD Removal] : intrauterine device (IUD) removal [Risks] : risks [Benefits] : benefits [Alternatives] : alternatives [Patient] : patient [Strings Visualized] : strings visualized [IUD Discarded] : IUD discarded [Tolerated Well] : Patient tolerated the procedure well [No Complications] : no complications [PRN] : as needed [de-identified] : Alternative form of contraception desired [FreeTextEntry6] : Call with fever, chills, N/V, increase in bleeding or vaginal odor.  55 y/o male presents to PST for scheduled left ureteroscopy laser lithotripsy vacuum stone on 2/3/25.

## 2025-01-27 NOTE — H&P PST ADULT - PROBLEM SELECTOR PLAN 1
Pre op  instructions given and explained.  Avoid NSAIDs and OTC supplements ( celebrex and MV)  Patient verbalized understanding  medical optimization requested by surgeon  last dose of zepbound 1/22/25  continue omeprazole, Wellbutrin, and Cymbalta on the DOS    cbc, bmp, done 1/7/25 in [facility]    u/a, urine culture, done today in PST

## 2025-01-27 NOTE — H&P PST ADULT - NSICDXFAMILYHX_GEN_ALL_CORE_FT
FAMILY HISTORY:  Mother  Still living? No  Family history of diabetes mellitus, Age at diagnosis: Age Unknown  Family history of renal failure, Age at diagnosis: Age Unknown    Uncle  Still living? Unknown  FHx: prostate cancer, Age at diagnosis: Age Unknown

## 2025-01-27 NOTE — H&P PST ADULT - NSICDXPASTSURGICALHX_GEN_ALL_CORE_FT
PAST SURGICAL HISTORY:  H/O arthroscopy of knee Right 2014    H/O cystoscopy 2016    H/O total knee replacement, right     S/P carpal tunnel release BIlateral 2015

## 2025-01-28 DIAGNOSIS — Z01.818 ENCOUNTER FOR OTHER PREPROCEDURAL EXAMINATION: ICD-10-CM

## 2025-01-28 PROBLEM — E66.9 OBESITY, UNSPECIFIED: Chronic | Status: INACTIVE | Noted: 2019-03-08 | Resolved: 2025-01-27

## 2025-01-28 LAB
CULTURE RESULTS: NO GROWTH — SIGNIFICANT CHANGE UP
SPECIMEN SOURCE: SIGNIFICANT CHANGE UP
URINE CYTOLOGY: NORMAL

## 2025-01-29 ENCOUNTER — APPOINTMENT (OUTPATIENT)
Dept: INTERNAL MEDICINE | Facility: CLINIC | Age: 55
End: 2025-01-29
Payer: COMMERCIAL

## 2025-01-29 VITALS
HEIGHT: 70 IN | WEIGHT: 181 LBS | OXYGEN SATURATION: 99 % | DIASTOLIC BLOOD PRESSURE: 76 MMHG | HEART RATE: 82 BPM | SYSTOLIC BLOOD PRESSURE: 118 MMHG | BODY MASS INDEX: 25.91 KG/M2

## 2025-01-29 DIAGNOSIS — L40.50 ARTHROPATHIC PSORIASIS, UNSPECIFIED: ICD-10-CM

## 2025-01-29 DIAGNOSIS — Z01.818 ENCOUNTER FOR OTHER PREPROCEDURAL EXAMINATION: ICD-10-CM

## 2025-01-29 DIAGNOSIS — I10 ESSENTIAL (PRIMARY) HYPERTENSION: ICD-10-CM

## 2025-01-29 DIAGNOSIS — N20.0 CALCULUS OF KIDNEY: ICD-10-CM

## 2025-01-29 DIAGNOSIS — R73.03 PREDIABETES.: ICD-10-CM

## 2025-01-29 PROCEDURE — 99214 OFFICE O/P EST MOD 30 MIN: CPT

## 2025-01-29 PROCEDURE — 36415 COLL VENOUS BLD VENIPUNCTURE: CPT

## 2025-01-29 PROCEDURE — G2211 COMPLEX E/M VISIT ADD ON: CPT

## 2025-01-29 RX ORDER — BUPROPION HYDROCHLORIDE 300 MG/1
300 TABLET, EXTENDED RELEASE ORAL DAILY
Refills: 0 | Status: ACTIVE | COMMUNITY
Start: 2025-01-29

## 2025-01-29 RX ORDER — TIRZEPATIDE 15 MG/.5ML
15 INJECTION, SOLUTION SUBCUTANEOUS WEEKLY
Refills: 0 | Status: ACTIVE | COMMUNITY
Start: 2025-01-29

## 2025-01-29 RX ORDER — MIRTAZAPINE 30 MG/1
30 TABLET, FILM COATED ORAL
Refills: 0 | Status: ACTIVE | COMMUNITY
Start: 2025-01-29

## 2025-01-30 LAB
ALBUMIN SERPL ELPH-MCNC: 4.8 G/DL
ALP BLD-CCNC: 66 U/L
ALT SERPL-CCNC: 27 U/L
ANION GAP SERPL CALC-SCNC: 17 MMOL/L
AST SERPL-CCNC: 38 U/L
BASOPHILS # BLD AUTO: 0.02 K/UL
BASOPHILS NFR BLD AUTO: 0.4 %
BILIRUB SERPL-MCNC: 0.6 MG/DL
BUN SERPL-MCNC: 22 MG/DL
CALCIUM SERPL-MCNC: 10 MG/DL
CHLORIDE SERPL-SCNC: 100 MMOL/L
CO2 SERPL-SCNC: 21 MMOL/L
CREAT SERPL-MCNC: 0.88 MG/DL
EGFR: 103 ML/MIN/1.73M2
EOSINOPHIL # BLD AUTO: 0.15 K/UL
EOSINOPHIL NFR BLD AUTO: 2.9 %
ESTIMATED AVERAGE GLUCOSE: 85 MG/DL
GLUCOSE SERPL-MCNC: 56 MG/DL
HBA1C MFR BLD HPLC: 4.6 %
HCT VFR BLD CALC: 36.9 %
HGB BLD-MCNC: 12.9 G/DL
IMM GRANULOCYTES NFR BLD AUTO: 0.2 %
LYMPHOCYTES # BLD AUTO: 2 K/UL
LYMPHOCYTES NFR BLD AUTO: 38.1 %
MAN DIFF?: NORMAL
MCHC RBC-ENTMCNC: 32.1 PG
MCHC RBC-ENTMCNC: 35 G/DL
MCV RBC AUTO: 91.8 FL
MONOCYTES # BLD AUTO: 0.3 K/UL
MONOCYTES NFR BLD AUTO: 5.7 %
NEUTROPHILS # BLD AUTO: 2.77 K/UL
NEUTROPHILS NFR BLD AUTO: 52.7 %
PLATELET # BLD AUTO: 246 K/UL
POTASSIUM SERPL-SCNC: 3.2 MMOL/L
PROT SERPL-MCNC: 7.4 G/DL
RBC # BLD: 4.02 M/UL
RBC # FLD: 12.2 %
SODIUM SERPL-SCNC: 138 MMOL/L
TSH SERPL-ACNC: 1.49 UIU/ML
WBC # FLD AUTO: 5.25 K/UL

## 2025-01-31 DIAGNOSIS — E87.6 HYPOKALEMIA: ICD-10-CM

## 2025-01-31 RX ORDER — POTASSIUM CHLORIDE 1.5 G/1.58G
20 POWDER, FOR SOLUTION ORAL DAILY
Qty: 3 | Refills: 0 | Status: ACTIVE | COMMUNITY
Start: 2025-01-31 | End: 1900-01-01

## 2025-02-03 ENCOUNTER — RESULT REVIEW (OUTPATIENT)
Age: 55
End: 2025-02-03

## 2025-02-03 ENCOUNTER — TRANSCRIPTION ENCOUNTER (OUTPATIENT)
Age: 55
End: 2025-02-03

## 2025-02-03 ENCOUNTER — APPOINTMENT (OUTPATIENT)
Dept: UROLOGY | Facility: HOSPITAL | Age: 55
End: 2025-02-03

## 2025-02-03 ENCOUNTER — OUTPATIENT (OUTPATIENT)
Dept: INPATIENT UNIT | Facility: HOSPITAL | Age: 55
LOS: 1 days | Discharge: HOME | End: 2025-02-03
Payer: COMMERCIAL

## 2025-02-03 VITALS
HEART RATE: 77 BPM | SYSTOLIC BLOOD PRESSURE: 110 MMHG | OXYGEN SATURATION: 97 % | TEMPERATURE: 97 F | DIASTOLIC BLOOD PRESSURE: 69 MMHG | RESPIRATION RATE: 14 BRPM

## 2025-02-03 VITALS
RESPIRATION RATE: 16 BRPM | HEART RATE: 87 BPM | TEMPERATURE: 98 F | OXYGEN SATURATION: 100 % | DIASTOLIC BLOOD PRESSURE: 79 MMHG | SYSTOLIC BLOOD PRESSURE: 122 MMHG | WEIGHT: 188.94 LBS | HEIGHT: 72 IN

## 2025-02-03 DIAGNOSIS — N20.0 CALCULUS OF KIDNEY: ICD-10-CM

## 2025-02-03 DIAGNOSIS — Z98.890 OTHER SPECIFIED POSTPROCEDURAL STATES: Chronic | ICD-10-CM

## 2025-02-03 DIAGNOSIS — F32.A DEPRESSION, UNSPECIFIED: ICD-10-CM

## 2025-02-03 DIAGNOSIS — G47.33 OBSTRUCTIVE SLEEP APNEA (ADULT) (PEDIATRIC): ICD-10-CM

## 2025-02-03 DIAGNOSIS — N40.0 BENIGN PROSTATIC HYPERPLASIA WITHOUT LOWER URINARY TRACT SYMPTOMS: ICD-10-CM

## 2025-02-03 DIAGNOSIS — I10 ESSENTIAL (PRIMARY) HYPERTENSION: ICD-10-CM

## 2025-02-03 DIAGNOSIS — Z79.85 LONG-TERM (CURRENT) USE OF INJECTABLE NON-INSULIN ANTIDIABETIC DRUGS: ICD-10-CM

## 2025-02-03 DIAGNOSIS — N32.9 BLADDER DISORDER, UNSPECIFIED: ICD-10-CM

## 2025-02-03 DIAGNOSIS — Z88.0 ALLERGY STATUS TO PENICILLIN: ICD-10-CM

## 2025-02-03 DIAGNOSIS — Z96.651 PRESENCE OF RIGHT ARTIFICIAL KNEE JOINT: Chronic | ICD-10-CM

## 2025-02-03 DIAGNOSIS — L40.9 PSORIASIS, UNSPECIFIED: ICD-10-CM

## 2025-02-03 DIAGNOSIS — Z87.891 PERSONAL HISTORY OF NICOTINE DEPENDENCE: ICD-10-CM

## 2025-02-03 LAB — SURGICAL PATHOLOGY STUDY: SIGNIFICANT CHANGE UP

## 2025-02-03 PROCEDURE — C1769: CPT

## 2025-02-03 PROCEDURE — C1889: CPT

## 2025-02-03 PROCEDURE — C2617: CPT

## 2025-02-03 PROCEDURE — 88300 SURGICAL PATH GROSS: CPT

## 2025-02-03 PROCEDURE — 76000 FLUOROSCOPY <1 HR PHYS/QHP: CPT

## 2025-02-03 PROCEDURE — 88300 SURGICAL PATH GROSS: CPT | Mod: 26

## 2025-02-03 PROCEDURE — 52001 CYSTO W/IRRG&EVAC MLT CLOTS: CPT | Mod: 59

## 2025-02-03 PROCEDURE — 82365 CALCULUS SPECTROSCOPY: CPT

## 2025-02-03 PROCEDURE — C9399: CPT

## 2025-02-03 PROCEDURE — 52356 CYSTO/URETERO W/LITHOTRIPSY: CPT | Mod: LT

## 2025-02-03 RX ORDER — ATORVASTATIN CALCIUM 80 MG/1
1 TABLET, FILM COATED ORAL
Refills: 0 | DISCHARGE

## 2025-02-03 RX ORDER — PHENAZOPYRIDINE HCL 100 MG
1 TABLET ORAL
Qty: 15 | Refills: 1
Start: 2025-02-03 | End: 2025-02-12

## 2025-02-03 RX ORDER — DULOXETINE 20 MG/1
1 CAPSULE, DELAYED RELEASE ORAL
Refills: 0 | DISCHARGE

## 2025-02-03 RX ORDER — PHENAZOPYRIDINE HCL 100 MG
200 TABLET ORAL ONCE
Refills: 0 | Status: DISCONTINUED | OUTPATIENT
Start: 2025-02-03 | End: 2025-02-03

## 2025-02-03 RX ORDER — BUPROPION HYDROCHLORIDE 150 MG/1
1 TABLET, EXTENDED RELEASE ORAL
Refills: 0 | DISCHARGE

## 2025-02-03 RX ORDER — TIZANIDINE HCL 4 MG
2 TABLET ORAL
Refills: 0 | DISCHARGE

## 2025-02-03 RX ORDER — SODIUM CHLORIDE 9 G/ML
1000 INJECTION, SOLUTION INTRAVENOUS
Refills: 0 | Status: DISCONTINUED | OUTPATIENT
Start: 2025-02-03 | End: 2025-02-03

## 2025-02-03 RX ORDER — LOSARTAN POTASSIUM 100 MG
1 TABLET ORAL
Refills: 0 | DISCHARGE

## 2025-02-03 RX ORDER — GABAPENTIN 800 MG/1
1 TABLET ORAL
Refills: 0 | DISCHARGE

## 2025-02-03 RX ORDER — TRAMADOL HYDROCHLORIDE 100 MG/1
1 TABLET, EXTENDED RELEASE ORAL
Qty: 15 | Refills: 0
Start: 2025-02-03 | End: 2025-02-07

## 2025-02-03 RX ORDER — OXYBUTYNIN CHLORIDE 5 MG/1
1 TABLET, EXTENDED RELEASE ORAL
Qty: 15 | Refills: 0
Start: 2025-02-03 | End: 2025-02-07

## 2025-02-03 RX ORDER — MECOBAL/LEVOMEFOLAT CA/B6 PHOS 2-3-35 MG
1 TABLET ORAL
Refills: 0 | DISCHARGE

## 2025-02-03 RX ORDER — ONDANSETRON 4 MG/1
4 TABLET, ORALLY DISINTEGRATING ORAL ONCE
Refills: 0 | Status: DISCONTINUED | OUTPATIENT
Start: 2025-02-03 | End: 2025-02-03

## 2025-02-03 RX ORDER — ETANERCEPT 25 MG
50 VIAL (EA) SUBCUTANEOUS
Refills: 0 | DISCHARGE

## 2025-02-03 RX ORDER — FENTANYL CITRATE 50 UG/ML
50 INJECTION INTRAMUSCULAR; INTRAVENOUS
Refills: 0 | Status: DISCONTINUED | OUTPATIENT
Start: 2025-02-03 | End: 2025-02-03

## 2025-02-03 RX ORDER — CELECOXIB 200 MG
1 CAPSULE ORAL
Refills: 0 | DISCHARGE

## 2025-02-03 RX ORDER — OXYCODONE HYDROCHLORIDE 30 MG/1
5 TABLET ORAL ONCE
Refills: 0 | Status: DISCONTINUED | OUTPATIENT
Start: 2025-02-03 | End: 2025-02-03

## 2025-02-03 NOTE — ASU PREOP CHECKLIST - IDENTIFICATION BAND VERIFIED
Consult  Pulmonary, Critical Care    Name: Magdalena Kimble MRN: [id number]   : 1953 Hospital: [facility]   Date: 2021  Admission date: 2021 Hospital Day: 5       Subjective/Interval History:   Seen in the emergency room wearing noninvasive ventilator. Patient has underlying renal insufficiency developed worsening shortness of breath cough presented to the emergency room chest x-ray shows pulmonary edema. He was profoundly hypoxic is now on noninvasive ventilator and feels more comfortable. Has not had any significant urine output since he has been in the ER. He also complains of new onset left arm swelling   post void bladder scan showed greater than 200 cc urine retention and Quesada catheter placed with 500 cc removed. Overnight he has put out an additional 1200 cc. Respirations improved currently nonlabored on nasal oxygen. Duplex scan of the left arm did show left axillary and proximal brachial DVT and heparin was started. On heparin with Quesada catheter and he has had slight bleeding at the urethral meatus. Ultrasound of the abdomen is pending  7/15 ultrasound of the abdomen showed medical renal disease on the right with small kidney no hydronephrosis there was evidence of prostate enlargement. He is breathing easily at this point and on room air is running on oxygen saturation of 93%   respirations nonlabored on nasal oxygen. Quesada catheter is out he states he has been voiding frequent small amounts without straining    Patient Active Problem List   Diagnosis Code    GI bleed K92.2    Pulmonary edema J81.1       IMPRESSION:   1. Acute hypoxic respiratory failure tolerating nasal oxygen 1 L  2. Acute pulmonary edema radiographically unchanged  3. 2 of 4 blood culture bottles with coagulase-negative staph aureus likely skin contaminant   4. Bilateral pleural effusions repeat chest x-ray unchanged  5.  Acute on chronic kidney disease abrupt worsening likely due to obstructive uropathy  6. Obstructive uropathy Quesada catheter has been removed  7. Severe hypertension improved but remains elevated  8. DVT left axillary and brachial currently on IV heparin  9. Anemia worsened yesterday transfusion 7/14 I cannot see any indication that it was given and hemoglobin is mildly improved today  10. Troponin leak likely due to renal insufficiency stable has not risen any further  Body mass index is 28.29 kg/m². 11.       RECOMMENDATIONS/PLAN:   1. Continue IV Lasix we will leave to renal to decide on switch to oral  2. Blood pressure remains a problem we will leave to renal to adjust medications  3. Continue Flomax for prostate enlargement although he denies frequency post void dribbling or small volume urine output will check postvoid residual with Quesada being removed  4. Continue sodium bicarb  5. We will place on room air and check overnight oximetry  6. Subjective/Initial History:       I was asked by Jamaica Hughes MD to see Zina Diez a 78 y.o.  male  in consultation for a chief complaint of shortness of breath pulmonary edema acute hypoxic respiratory failure        Patient PCP: Anuj Smith MD  PMH:  has a past medical history of Chronic kidney disease and Hypertension. PSH:   has no past surgical history on file. FHX: family history is not on file. SHX:  reports that he has never smoked.  He has never used smokeless tobacco.    Systemic review somewhat limited as he is on noninvasive ventilator remains short of breath although states he is feeling better  General he has not noted any worsening edema of his upper legs fever chills or sweats does complain of new onset swelling of his left hand and arm  Eyes no double vision or momentary blindness  ENT no facial pain over the sinuses  Endocrinologic no polyuria polydipsia  Musculoskeletal no swollen tender joints  Neurologic no history seizures or syncope  Gastrointestinal no nausea vomiting acid indigestion  Genitourinary states he does still pass fair amount of urine each day has not noted any decrease in that.   Does not have to strain to urinate has no bleeding or drainage  Cardiovascular he is not aware of any underlying heart disease has not had chest pain diaphoresis has had worsening shortness of breath laying down and with exertion  Respiratory worsening shortness of breath over the last week or more no significant cough no sputum production no chills or sweats did have history COVID-19 pneumonitis January of this year    No Known Allergies   MEDS:   Current Facility-Administered Medications   Medication    minoxidiL (LONITEN) tablet 2.5 mg    amLODIPine (NORVASC) tablet 10 mg    furosemide (LASIX) injection 80 mg    tamsulosin (FLOMAX) capsule 0.4 mg    hydrALAZINE (APRESOLINE) tablet 50 mg    sodium bicarbonate tablet 650 mg    0.9% sodium chloride infusion 250 mL    calcitRIOL (ROCALTROL) capsule 0.25 mcg    sevelamer carbonate (RENVELA) tab 800 mg    ergocalciferol capsule 50,000 Units    iron sucrose (VENOFER) injection 200 mg    hydrALAZINE (APRESOLINE) 20 mg/mL injection 40 mg    cloNIDine (CATAPRES) 0.3 mg/24 hr patch 1 Patch    heparin 25,000 units in D5W 250 ml infusion    heparin (porcine) 1,000 unit/mL injection 6,340 Units    Or    heparin (porcine) 1,000 unit/mL injection 3,170 Units        Current Facility-Administered Medications:     minoxidiL (LONITEN) tablet 2.5 mg, 2.5 mg, Oral, BID, Gala Florentino MD, 2.5 mg at 07/16/21 0833    amLODIPine (NORVASC) tablet 10 mg, 10 mg, Oral, DAILY, Dede Caldwell MD, 10 mg at 07/16/21 1589    furosemide (LASIX) injection 80 mg, 80 mg, IntraVENous, Q12H, Dede Caldwell MD, 80 mg at 07/16/21 0833    tamsulosin (FLOMAX) capsule 0.4 mg, 0.4 mg, Oral, DAILY, Marshal Preciado MD, 0.4 mg at 07/16/21 7334    hydrALAZINE (APRESOLINE) tablet 50 mg, 50 mg, Oral, TID, Marshal Preciado MD, 50 mg at 07/16/21 7732    sodium bicarbonate tablet 650 mg, 650 mg, Oral, TID, Venecia Duong MD, 650 mg at 21 0832    0.9% sodium chloride infusion 250 mL, 250 mL, IntraVENous, PRN, Venecia Duong MD    calcitRIOL (ROCALTROL) capsule 0.25 mcg, 0.25 mcg, Oral, DAILY, Yuko Bah MD, 0.25 mcg at 21 1161    sevelamer carbonate (RENVELA) tab 800 mg, 800 mg, Oral, TID WITH MEALS, Yuko Bah MD, 800 mg at 21 1733    ergocalciferol capsule 50,000 Units, 50,000 Units, Oral, Q7D, Yuko Bah MD, 50,000 Units at 21 1841    iron sucrose (VENOFER) injection 200 mg, 200 mg, IntraVENous, Q24H, Yuko Bah MD, 200 mg at 07/15/21 2305    hydrALAZINE (APRESOLINE) 20 mg/mL injection 40 mg, 40 mg, IntraVENous, Q6H PRN, Venecia Duong MD, 40 mg at 07/15/21 1552    cloNIDine (CATAPRES) 0.3 mg/24 hr patch 1 Patch, 1 Patch, TransDERmal, Q7D, Venecia Duong MD    heparin 25,000 units in D5W 250 ml infusion, 18-36 Units/kg/hr (Adjusted), IntraVENous, TITRATE, Olivia Hooper MD, Last Rate: 11.9 mL/hr at 21 0640, 15 Units/kg/hr at 21 0640    heparin (porcine) 1,000 unit/mL injection 6,340 Units, 80 Units/kg (Adjusted), IntraVENous, PRN **OR** heparin (porcine) 1,000 unit/mL injection 3,170 Units, 40 Units/kg (Adjusted), IntraVENous, PRN, Olivia Hooper MD, 3,170 Units at 21 0008      Objective:     Vital Signs: Telemetry:    normal sinus rhythm Intake/Output:   Visit Vitals  BP (!) 176/100 (BP 1 Location: Right lower arm, BP Patient Position: At rest)   Pulse 91   Temp 97.6 °F (36.4 °C)   Resp 20   Ht 6' (1.829 m)   Wt 94.6 kg (208 lb 8.9 oz)   SpO2 92%   BMI 28.29 kg/m²       Temp (24hrs), Av.3 °F (36.8 °C), Min:97.6 °F (36.4 °C), Max:98.8 °F (37.1 °C)        O2 Device: None (Room air) O2 Flow Rate (L/min): 1 l/min         Body mass index is 28.29 kg/m².     Wt Readings from Last 4 Encounters:   21 94.6 kg (208 lb 8.9 oz)   21 79.4 kg (175 lb) Intake/Output Summary (Last 24 hours) at 7/16/2021 1019  Last data filed at 7/16/2021 0640  Gross per 24 hour   Intake --   Output 1350 ml   Net -1350 ml       Last shift:      No intake/output data recorded. Last 3 shifts: 07/14 1901 - 07/16 0700  In: 5 [P.O.:480]  Out: 2200 [Urine:2200]       Hemodynamics:    CO:    CI:    CVP:    SVR:   PAP Systolic:    PAP Diastolic:    PVR:    QE70:       Ventilator Settings:      Mode Rate TV Press PEEP FiO2 PIP Min. Vent               28 %     9.7 l/min      Physical Exam:    General:  male; currently on nasal oxygen in no distress  HEENT: NCAT, visible oral mucosa is moist and clear  Eyes: anicteric; conjunctiva clear extraocular movements intact  Neck: no nodes,,no accessory MM use. no respiratory distress  Chest: no deformity,   Cardiac: Regular rate and rhythm  Lungs: distant breath sounds; clear anteriorly and laterally with rales in both bases  Abd: Soft positive bowel sounds no tenderness  Ext: Improvement edema pitting and lymphedema of the lower extremities with new onset  edema of the left arm; no joint swelling;  No clubbing  : Cloudy urine  Neuro: Alert awake no distress speech is clear moves all 4 extremities  Psych-calm, oriented to person;   Skin: warm, dry, no cyanosis;   Pulses: Brachial and radial pulses intact  Capillary:slow capillary refill      Labs:    Recent Labs     07/16/21  0346 07/15/21  1048 07/15/21  0145   WBC 6.6 5.2 5.2   HGB 8.7* 8.3* 7.3*    157 171   APTT 128.8* 98.7* 99.6*     Recent Labs     07/16/21  0346 07/15/21  0154 07/15/21  0145 07/14/21  0720 07/14/21  0720     142 140 141   < > 142  142   K 4.0  4.0 5.0 5.0   < > 6.0*  6.0*   *  109* 109* 110*   < > 114*  113*   CO2 20*  19* 22 21   < > 16*  17*   *  99 101* 101*   < > 91  91   BUN 67*  67* 67* 67*   < > 72*  71*   CREA 6.43*  6.42* 6.31* 6.27*   < > 6.41*  6.36*   CA 8.3*  8.2* 7.8* 7.8*   < > 8.3*  8.2*  8.2* MG  --   --   --   --  2.9*   PHOS 8.1*  --  8.4*  --  8.5*   ALB 3.5  3.4* 3.1* 3.1*   < > 3.2*  3.2*   ALT 16 15  --   --  14    < > = values in this interval not displayed. 7/16 overnight oximetry on 1 L shows only 2 minutes 20 seconds below 88% with a low oxygen saturation of 83%  7/15 room air oxygen saturation 93%  currently on noninvasive ventilator inspiratory pressure 16 expiratory pressure six rate 16 FiO2 28% with oxygen saturation 96%   Recent Labs     07/14/21  0720      TROIQ 0.17*     BNP greater than 35,000  Lab Results   Component Value Date/Time    Culture result:  07/12/2021 10:40 PM     Two of four bottles have been flagged positive by instrument. Bottles have been sent to [facility] laboratory to assess for possible growth. Gram Positive Cocci in clusters CALLED TO AND READ BACK BY Aleja Cano r.n. [phone] 07/14/2021 by dpw    Culture result:  07/12/2021 10:40 PM     Probable Staphylococcus species, coagulase negative growing in 2 of 4 bottles drawn (ONE AEROBIC, AND ONE ANAEROBIC BOTTLE    Culture result: No growth 6 days 01/05/2021 06:30 PM        Imaging:  I have personally reviewed the patients radiographs and have reviewed the reports:    CXR Results  (Last 48 hours)  7/16 chest x-ray with continued mild pulmonary edema and small pleural effusions unchanged               07/12/21 2251  XR CHEST PORT Final result    Impression:  Findings/impression:       Cardiac silhouette is enlarged. Central vascular congestion and patchy central   airspace disease/edema. Suspect trace right pleural effusion. No evidence of pneumothorax. No acute osseous abnormality identified. Narrative:  Study: XR CHEST PORT       Clinical indication: sob       Comparison: None.    To me chest x-ray consistent with cardiomegaly pulmonary edema and bilateral pleural effusions           Results from Hospital Encounter encounter on 07/12/21    XR CHEST PORT    Narrative  Exam: Portable upright chest radiograph    COMPARISON: One day prior. Impression  Findings/impression: Stable cardiomediastinal silhouette. Similar central  pulmonary vascular congestion and bilateral patchy airspace opacities. No  significant pleural effusion. No pneumothorax. Findings are not significantly changed. XR CHEST PORT    Narrative  1 view comparison to 12    Continued cardiomegaly and congestion. If There is mild interstitial edema, it  is unchanged. Right pleural effusion and adjacent atelectasis have improved. XR CHEST PORT    Narrative  Study: XR CHEST PORT    Clinical indication: sob    Comparison: None. Impression  Findings/impression:    Cardiac silhouette is enlarged. Central vascular congestion and patchy central  airspace disease/edema. Suspect trace right pleural effusion. No evidence of pneumothorax. No acute osseous abnormality identified. Results from [place] encounter on 01/05/21    CT CHEST WO CONT    Narrative  Images obtained without contrast include the abdomen and pelvis. Comparison portable chest radiograph earlier today. Dose Reduction:  All CT scans at this facility are performed using dose reduction optimization  techniques as appropriate to a performed exam including the following: Automated  exposure control, adjustments of the mA and/or kV according to patient size, or  use of iterative reconstruction technique. Subtle peripheral groundglass densities are noted in both lungs, could reflect  Covid pneumonia or other. No pneumothorax or pneumomediastinum. The radiographic findings suspicious for  pneumomediastinum probably was artifact, perhaps related to cardiac motion. No pleural effusion or adenopathy. Cardiomegaly again noted. Impression  IMPRESSION:  1. No pneumomediastinum or pneumothorax. 2. Cardiomegaly. 3. Subtle groundglass densities in both lungs might be pneumonia or other.       Discussion patient with worsening renal insufficiency has developed pulmonary edema acute hypoxic respiratory failure. He states he still has urine output normally at home. We will give him high-dose IV Lasix to see if diuresis is induced. He very likely will need dialysis. He has minimal elevation in troponin probably troponin leak from hypoxia or just due to his renal insufficiency. He is not having any chest pain. Does have severe hypertension. Has been started on clonidine and hydralazine. 7/14 no distress today on nasal oxygen. Did have residual on post void bladder scan and Quesada catheter was placed with good diuresis overnight. Despite this potassium remains elevated. We will give 1 dose of Kayexalate. Despite diuresis hemoglobin is dropped to 6.7 will transfuse. He denies frequency small-volume urine or straining to urinate at home despite this with signs of obstruction we will add Flomax. Potassium 6 today we will give 1 dose of Kayexalate and continue diuresis  7/16 respirations nonlabored. Was placed back on 1 L nasal oxygen yesterday overnight oximetry shows well-maintained oxygen saturation. Will check overnight tonight on and off oxygen. Quesada catheter has been removed. Chest x-ray continues to show mild pulmonary edema         Thank you for allowing us to participate in the care of this patient.   We will follow along with you     Dolly Nunn MD done

## 2025-02-03 NOTE — ASU DISCHARGE PLAN (ADULT/PEDIATRIC) - FINANCIAL ASSISTANCE
[facility] provides services at a reduced cost to those who are determined to be eligible through [facility]’s financial assistance program. Information regarding [facility]’s financial assistance program can be found by going to [email or web address] or by calling [phone].

## 2025-02-03 NOTE — BRIEF OPERATIVE NOTE - NSICDXBRIEFPROCEDURE_GEN_ALL_CORE_FT
PROCEDURES:  Ureteroscopy, with laser lithotripsy and stent insertion 03-Feb-2025 11:00:33 steerable vacuum stone extraction, Left Kavon Medrano S

## 2025-02-03 NOTE — ASU DISCHARGE PLAN (ADULT/PEDIATRIC) - CARE PROVIDER_API CALL
Kavon Medrano Count includes the [facility]  Urology  [address] [place] [id number]  Phone: [phone]  Fax: [phone]  Scheduled Appointment: 02/07/2025 09:40 AM

## 2025-02-07 ENCOUNTER — APPOINTMENT (OUTPATIENT)
Dept: UROLOGY | Facility: CLINIC | Age: 55
End: 2025-02-07

## 2025-02-07 VITALS
SYSTOLIC BLOOD PRESSURE: 118 MMHG | DIASTOLIC BLOOD PRESSURE: 73 MMHG | BODY MASS INDEX: 25.91 KG/M2 | WEIGHT: 181 LBS | HEIGHT: 70 IN

## 2025-02-07 PROCEDURE — 99214 OFFICE O/P EST MOD 30 MIN: CPT | Mod: 25

## 2025-02-07 PROCEDURE — 52310 CYSTOSCOPY AND TREATMENT: CPT

## 2025-02-07 PROCEDURE — A4550 SURGICAL TRAYS: CPT | Mod: NC

## 2025-02-10 ENCOUNTER — APPOINTMENT (OUTPATIENT)
Dept: INTERNAL MEDICINE | Facility: CLINIC | Age: 55
End: 2025-02-10

## 2025-02-11 LAB
CELL MATERIAL STONE EST-MCNT: SIGNIFICANT CHANGE UP
LABORATORY COMMENT REPORT: SIGNIFICANT CHANGE UP
NIDUS STONE QN: SIGNIFICANT CHANGE UP

## 2025-02-12 ENCOUNTER — APPOINTMENT (OUTPATIENT)
Dept: RHEUMATOLOGY | Facility: CLINIC | Age: 55
End: 2025-02-12

## 2025-02-22 ENCOUNTER — TRANSCRIPTION ENCOUNTER (OUTPATIENT)
Age: 55
End: 2025-02-22

## 2025-03-18 ENCOUNTER — RX RENEWAL (OUTPATIENT)
Age: 55
End: 2025-03-18

## 2025-03-20 ENCOUNTER — RX RENEWAL (OUTPATIENT)
Age: 55
End: 2025-03-20

## 2025-05-02 ENCOUNTER — APPOINTMENT (OUTPATIENT)
Dept: RHEUMATOLOGY | Facility: CLINIC | Age: 55
End: 2025-05-02

## 2025-05-02 VITALS
SYSTOLIC BLOOD PRESSURE: 126 MMHG | HEIGHT: 70 IN | OXYGEN SATURATION: 98 % | DIASTOLIC BLOOD PRESSURE: 60 MMHG | HEART RATE: 87 BPM

## 2025-05-02 DIAGNOSIS — M75.101 UNSPECIFIED ROTATOR CUFF TEAR OR RUPTURE OF RIGHT SHOULDER, NOT SPECIFIED AS TRAUMATIC: ICD-10-CM

## 2025-05-02 DIAGNOSIS — L60.3 NAIL DYSTROPHY: ICD-10-CM

## 2025-05-02 DIAGNOSIS — M75.102 UNSPECIFIED ROTATOR CUFF TEAR OR RUPTURE OF LEFT SHOULDER, NOT SPECIFIED AS TRAUMATIC: ICD-10-CM

## 2025-05-02 PROCEDURE — 99214 OFFICE O/P EST MOD 30 MIN: CPT

## 2025-05-15 ENCOUNTER — APPOINTMENT (OUTPATIENT)
Dept: CARDIOLOGY | Facility: CLINIC | Age: 55
End: 2025-05-15

## 2025-06-05 ENCOUNTER — NON-APPOINTMENT (OUTPATIENT)
Age: 55
End: 2025-06-05

## 2025-06-10 ENCOUNTER — NON-APPOINTMENT (OUTPATIENT)
Age: 55
End: 2025-06-10

## 2025-06-24 ENCOUNTER — APPOINTMENT (OUTPATIENT)
Dept: INTERNAL MEDICINE | Facility: CLINIC | Age: 55
End: 2025-06-24
Payer: COMMERCIAL

## 2025-06-24 VITALS
BODY MASS INDEX: 25.34 KG/M2 | HEART RATE: 68 BPM | SYSTOLIC BLOOD PRESSURE: 111 MMHG | HEIGHT: 70 IN | DIASTOLIC BLOOD PRESSURE: 68 MMHG | WEIGHT: 177 LBS | RESPIRATION RATE: 17 BRPM | OXYGEN SATURATION: 98 % | TEMPERATURE: 97 F

## 2025-06-24 PROCEDURE — 93000 ELECTROCARDIOGRAM COMPLETE: CPT

## 2025-06-24 PROCEDURE — 99396 PREV VISIT EST AGE 40-64: CPT | Mod: GC

## 2025-06-25 LAB
ALBUMIN SERPL ELPH-MCNC: 4.6 G/DL
ALP BLD-CCNC: 79 U/L
ALT SERPL-CCNC: 30 U/L
ANION GAP SERPL CALC-SCNC: 14 MMOL/L
APPEARANCE: CLEAR
AST SERPL-CCNC: 29 U/L
BASOPHILS # BLD AUTO: 0.03 K/UL
BASOPHILS NFR BLD AUTO: 0.7 %
BILIRUB SERPL-MCNC: 0.7 MG/DL
BILIRUBIN URINE: NEGATIVE
BLOOD URINE: NEGATIVE
BUN SERPL-MCNC: 24 MG/DL
CALCIUM SERPL-MCNC: 10 MG/DL
CHLORIDE SERPL-SCNC: 103 MMOL/L
CHOLEST SERPL-MCNC: 91 MG/DL
CO2 SERPL-SCNC: 22 MMOL/L
COLOR: YELLOW
CREAT SERPL-MCNC: 0.97 MG/DL
CREAT SPEC-SCNC: 193 MG/DL
EGFRCR SERPLBLD CKD-EPI 2021: 93 ML/MIN/1.73M2
EOSINOPHIL # BLD AUTO: 0.19 K/UL
EOSINOPHIL NFR BLD AUTO: 4.7 %
ESTIMATED AVERAGE GLUCOSE: 91 MG/DL
GLUCOSE QUALITATIVE U: NEGATIVE MG/DL
GLUCOSE SERPL-MCNC: 79 MG/DL
HBA1C MFR BLD HPLC: 4.8 %
HCT VFR BLD CALC: 38.4 %
HCV AB SER QL: NONREACTIVE
HCV S/CO RATIO: 0.11 S/CO
HDLC SERPL-MCNC: 48 MG/DL
HGB BLD-MCNC: 13.1 G/DL
IMM GRANULOCYTES NFR BLD AUTO: 0.2 %
KETONES URINE: 15 MG/DL
LDLC SERPL-MCNC: 31 MG/DL
LEUKOCYTE ESTERASE URINE: NEGATIVE
LYMPHOCYTES # BLD AUTO: 1.37 K/UL
LYMPHOCYTES NFR BLD AUTO: 33.9 %
MAN DIFF?: NORMAL
MCHC RBC-ENTMCNC: 32.1 PG
MCHC RBC-ENTMCNC: 34.1 G/DL
MCV RBC AUTO: 94.1 FL
MICROALBUMIN 24H UR DL<=1MG/L-MCNC: 1.5 MG/DL
MICROALBUMIN/CREAT 24H UR-RTO: 8 MG/G
MONOCYTES # BLD AUTO: 0.27 K/UL
MONOCYTES NFR BLD AUTO: 6.7 %
NEUTROPHILS # BLD AUTO: 2.17 K/UL
NEUTROPHILS NFR BLD AUTO: 53.8 %
NITRITE URINE: NEGATIVE
NONHDLC SERPL-MCNC: 43 MG/DL
PH URINE: 6
PLATELET # BLD AUTO: 249 K/UL
POTASSIUM SERPL-SCNC: 3.9 MMOL/L
PROT SERPL-MCNC: 7.1 G/DL
PROTEIN URINE: NEGATIVE MG/DL
PSA SERPL-MCNC: 0.78 NG/ML
RBC # BLD: 4.08 M/UL
RBC # FLD: 12.6 %
SODIUM SERPL-SCNC: 139 MMOL/L
SPECIFIC GRAVITY URINE: 1.03
TRIGL SERPL-MCNC: 43 MG/DL
TSH SERPL-ACNC: 1.09 UIU/ML
UROBILINOGEN URINE: 0.2 MG/DL
WBC # FLD AUTO: 4.04 K/UL

## 2025-07-07 ENCOUNTER — APPOINTMENT (OUTPATIENT)
Dept: ORTHOPEDIC SURGERY | Facility: CLINIC | Age: 55
End: 2025-07-07
Payer: OTHER MISCELLANEOUS

## 2025-07-07 VITALS — HEIGHT: 70 IN | BODY MASS INDEX: 25.34 KG/M2 | WEIGHT: 177 LBS

## 2025-07-07 PROBLEM — M75.31 CALCIFIC TENDINITIS OF RIGHT SHOULDER: Status: ACTIVE | Noted: 2025-07-07

## 2025-07-07 PROCEDURE — 73030 X-RAY EXAM OF SHOULDER: CPT | Mod: RT

## 2025-07-07 PROCEDURE — 99214 OFFICE O/P EST MOD 30 MIN: CPT

## 2025-07-24 ENCOUNTER — APPOINTMENT (OUTPATIENT)
Dept: DERMATOLOGY | Facility: CLINIC | Age: 55
End: 2025-07-24

## 2025-07-28 ENCOUNTER — OUTPATIENT (OUTPATIENT)
Dept: OUTPATIENT SERVICES | Facility: HOSPITAL | Age: 55
LOS: 1 days | End: 2025-07-28
Payer: COMMERCIAL

## 2025-07-28 ENCOUNTER — RESULT REVIEW (OUTPATIENT)
Age: 55
End: 2025-07-28

## 2025-07-28 ENCOUNTER — APPOINTMENT (OUTPATIENT)
Dept: MRI IMAGING | Facility: CLINIC | Age: 55
End: 2025-07-28
Payer: OTHER MISCELLANEOUS

## 2025-07-28 DIAGNOSIS — Z96.651 PRESENCE OF RIGHT ARTIFICIAL KNEE JOINT: Chronic | ICD-10-CM

## 2025-07-28 DIAGNOSIS — M75.31 CALCIFIC TENDINITIS OF RIGHT SHOULDER: ICD-10-CM

## 2025-07-28 DIAGNOSIS — Z98.890 OTHER SPECIFIED POSTPROCEDURAL STATES: Chronic | ICD-10-CM

## 2025-07-28 PROCEDURE — 73221 MRI JOINT UPR EXTREM W/O DYE: CPT

## 2025-07-28 PROCEDURE — 73221 MRI JOINT UPR EXTREM W/O DYE: CPT | Mod: 26,RT

## 2025-08-15 ENCOUNTER — APPOINTMENT (OUTPATIENT)
Dept: RHEUMATOLOGY | Facility: CLINIC | Age: 55
End: 2025-08-15

## 2025-08-15 VITALS
DIASTOLIC BLOOD PRESSURE: 76 MMHG | OXYGEN SATURATION: 97 % | HEIGHT: 70 IN | HEART RATE: 91 BPM | RESPIRATION RATE: 17 BRPM | SYSTOLIC BLOOD PRESSURE: 120 MMHG

## 2025-08-15 DIAGNOSIS — L40.50 ARTHROPATHIC PSORIASIS, UNSPECIFIED: ICD-10-CM

## 2025-08-25 ENCOUNTER — APPOINTMENT (OUTPATIENT)
Dept: ORTHOPEDIC SURGERY | Facility: CLINIC | Age: 55
End: 2025-08-25
Payer: OTHER MISCELLANEOUS

## 2025-08-25 VITALS — BODY MASS INDEX: 25.34 KG/M2 | WEIGHT: 177 LBS | HEIGHT: 70 IN

## 2025-08-25 DIAGNOSIS — M75.121 COMPLETE ROTATOR CUFF TEAR OR RUPTURE OF RIGHT SHOULDER, NOT SPECIFIED AS TRAUMATIC: ICD-10-CM

## 2025-08-25 PROCEDURE — 99214 OFFICE O/P EST MOD 30 MIN: CPT

## (undated) DEVICE — DEVICE ORTHALIGN PLUS

## (undated) DEVICE — NDL HYPO SAFE 20G X 1.5"

## (undated) DEVICE — KT PULSAVAC WOUND W/ SPRAYTIP

## (undated) DEVICE — SUT DERMABOND PRINEO 22CM

## (undated) DEVICE — BLADE SAGITTAL DUAL CUT 75X18X1.27MM

## (undated) DEVICE — SOL IRR POUR NS 0.9% 1000ML

## (undated) DEVICE — SOL IRR POUR H2O 1000ML

## (undated) DEVICE — WRAP COMPRESSION CALF MED

## (undated) DEVICE — ZIMMER BLUE CURETTE

## (undated) DEVICE — PACK TOTAL KNEE

## (undated) DEVICE — DRAPE U LONG 47X70"

## (undated) DEVICE — SUT VICRYL 0 18" CT-1 UNDYED

## (undated) DEVICE — DRAPE HALF SHEET 40X57"

## (undated) DEVICE — DRAPE 3/4 SHEET 52X76"

## (undated) DEVICE — HOOD FLYTE STRYKER SURGICOOL W PEELAWAY

## (undated) DEVICE — BLADE ZIMMER PATELLA REAMER W PILOT HOLE SZ 32

## (undated) DEVICE — BLANKET WARMER UPPER ADULT

## (undated) DEVICE — SYR LUER LOK 50CC

## (undated) DEVICE — DRAPE SHEET XL 77X98"

## (undated) DEVICE — ELCTR PENCIL NEPTUNE SMOKE EVACUATION

## (undated) DEVICE — BLADE ZIMMER PATELLA REAMER SZ 29

## (undated) DEVICE — FORCEP ENDOJAW AGTR LC W NDL 2.8MM 230CM DISP

## (undated) DEVICE — SUT MONOCRYL 3-0 27" PS-2 UNDYED

## (undated) DEVICE — SOL BETADINE POUCH 0.75OZ STERILE

## (undated) DEVICE — SAW BLADE ZIMMER RECIPROCATING DOUBLE SIDED 12.5X96X1.19MM

## (undated) DEVICE — SUT VICRYL 2-0 27" CT-2 UNDYED

## (undated) DEVICE — TAPE SILK 3"

## (undated) DEVICE — VALVE ENDO SURESEAL II 0-5FR

## (undated) DEVICE — GLV 8 PROTEXIS

## (undated) DEVICE — ZIMMER MIXING BOWL